# Patient Record
Sex: MALE | Race: WHITE | Employment: PART TIME | URBAN - METROPOLITAN AREA
[De-identification: names, ages, dates, MRNs, and addresses within clinical notes are randomized per-mention and may not be internally consistent; named-entity substitution may affect disease eponyms.]

---

## 2018-07-12 ENCOUNTER — HOSPITAL ENCOUNTER (EMERGENCY)
Age: 28
Discharge: HOME | End: 2018-07-13
Attending: EMERGENCY MEDICINE
Payer: COMMERCIAL

## 2018-07-12 ENCOUNTER — APPOINTMENT (OUTPATIENT)
Dept: GENERAL RADIOLOGY | Age: 28
End: 2018-07-12
Payer: COMMERCIAL

## 2018-07-12 DIAGNOSIS — S62.314A CLOSED DISPLACED FRACTURE OF BASE OF FOURTH METACARPAL BONE OF RIGHT HAND, INITIAL ENCOUNTER: Primary | ICD-10-CM

## 2018-07-12 DIAGNOSIS — S63.266A CLOSED DISLOCATION OF FIFTH METACARPAL BONE OF RIGHT HAND: ICD-10-CM

## 2018-07-12 PROCEDURE — 99152 MOD SED SAME PHYS/QHP 5/>YRS: CPT

## 2018-07-12 PROCEDURE — 73130 X-RAY EXAM OF HAND: CPT

## 2018-07-12 PROCEDURE — 26700 TREAT KNUCKLE DISLOCATION: CPT

## 2018-07-12 PROCEDURE — 6360000002 HC RX W HCPCS: Performed by: EMERGENCY MEDICINE

## 2018-07-12 PROCEDURE — 2500000003 HC RX 250 WO HCPCS: Performed by: EMERGENCY MEDICINE

## 2018-07-12 PROCEDURE — 6370000000 HC RX 637 (ALT 250 FOR IP): Performed by: EMERGENCY MEDICINE

## 2018-07-12 PROCEDURE — 96374 THER/PROPH/DIAG INJ IV PUSH: CPT

## 2018-07-12 PROCEDURE — 99283 EMERGENCY DEPT VISIT LOW MDM: CPT

## 2018-07-12 PROCEDURE — 2580000003 HC RX 258: Performed by: EMERGENCY MEDICINE

## 2018-07-12 RX ORDER — ETOMIDATE 2 MG/ML
10 INJECTION INTRAVENOUS ONCE
Status: COMPLETED | OUTPATIENT
Start: 2018-07-12 | End: 2018-07-12

## 2018-07-12 RX ORDER — 0.9 % SODIUM CHLORIDE 0.9 %
500 INTRAVENOUS SOLUTION INTRAVENOUS ONCE
Status: COMPLETED | OUTPATIENT
Start: 2018-07-12 | End: 2018-07-12

## 2018-07-12 RX ORDER — HYDROCODONE BITARTRATE AND ACETAMINOPHEN 5; 325 MG/1; MG/1
2 TABLET ORAL ONCE
Status: COMPLETED | OUTPATIENT
Start: 2018-07-12 | End: 2018-07-12

## 2018-07-12 RX ORDER — IBUPROFEN 800 MG/1
800 TABLET ORAL ONCE
Status: COMPLETED | OUTPATIENT
Start: 2018-07-12 | End: 2018-07-12

## 2018-07-12 RX ORDER — MORPHINE SULFATE 4 MG/ML
4 INJECTION, SOLUTION INTRAMUSCULAR; INTRAVENOUS ONCE
Status: COMPLETED | OUTPATIENT
Start: 2018-07-12 | End: 2018-07-12

## 2018-07-12 RX ADMIN — SODIUM CHLORIDE 500 ML: 9 INJECTION, SOLUTION INTRAVENOUS at 22:19

## 2018-07-12 RX ADMIN — HYDROCODONE BITARTRATE AND ACETAMINOPHEN 2 TABLET: 5; 325 TABLET ORAL at 21:17

## 2018-07-12 RX ADMIN — IBUPROFEN 800 MG: 800 TABLET ORAL at 21:17

## 2018-07-12 RX ADMIN — ETOMIDATE 10 MG: 2 INJECTION INTRAVENOUS at 23:08

## 2018-07-12 RX ADMIN — ETOMIDATE 10 MG: 2 INJECTION INTRAVENOUS at 22:23

## 2018-07-12 RX ADMIN — MORPHINE SULFATE 4 MG: 4 INJECTION INTRAVENOUS at 23:25

## 2018-07-12 ASSESSMENT — PAIN SCALES - GENERAL
PAINLEVEL_OUTOF10: 10
PAINLEVEL_OUTOF10: 6
PAINLEVEL_OUTOF10: 10

## 2018-07-12 ASSESSMENT — PAIN DESCRIPTION - ORIENTATION: ORIENTATION: RIGHT

## 2018-07-12 ASSESSMENT — PAIN DESCRIPTION - PAIN TYPE: TYPE: ACUTE PAIN

## 2018-07-12 ASSESSMENT — PAIN DESCRIPTION - PROGRESSION: CLINICAL_PROGRESSION: GRADUALLY WORSENING

## 2018-07-12 ASSESSMENT — PAIN DESCRIPTION - ONSET: ONSET: SUDDEN

## 2018-07-12 ASSESSMENT — PAIN DESCRIPTION - LOCATION: LOCATION: HAND

## 2018-07-13 VITALS
SYSTOLIC BLOOD PRESSURE: 145 MMHG | BODY MASS INDEX: 31.44 KG/M2 | RESPIRATION RATE: 5 BRPM | HEART RATE: 68 BPM | HEIGHT: 74 IN | TEMPERATURE: 98.4 F | DIASTOLIC BLOOD PRESSURE: 58 MMHG | OXYGEN SATURATION: 96 % | WEIGHT: 245 LBS

## 2018-07-13 PROCEDURE — 6370000000 HC RX 637 (ALT 250 FOR IP): Performed by: EMERGENCY MEDICINE

## 2018-07-13 RX ORDER — MORPHINE SULFATE 4 MG/ML
4 INJECTION, SOLUTION INTRAMUSCULAR; INTRAVENOUS ONCE
Status: DISCONTINUED | OUTPATIENT
Start: 2018-07-13 | End: 2018-07-13 | Stop reason: HOSPADM

## 2018-07-13 RX ORDER — HYDROCODONE BITARTRATE AND ACETAMINOPHEN 5; 325 MG/1; MG/1
1 TABLET ORAL EVERY 6 HOURS PRN
Qty: 10 TABLET | Refills: 0 | Status: SHIPPED | OUTPATIENT
Start: 2018-07-13 | End: 2018-07-16

## 2018-07-13 RX ORDER — HYDROCODONE BITARTRATE AND ACETAMINOPHEN 5; 325 MG/1; MG/1
2 TABLET ORAL ONCE
Status: COMPLETED | OUTPATIENT
Start: 2018-07-13 | End: 2018-07-13

## 2018-07-13 RX ADMIN — HYDROCODONE BITARTRATE AND ACETAMINOPHEN 2 TABLET: 5; 325 TABLET ORAL at 00:51

## 2018-07-13 ASSESSMENT — PAIN SCALES - GENERAL
PAINLEVEL_OUTOF10: 7
PAINLEVEL_OUTOF10: 4
PAINLEVEL_OUTOF10: 6

## 2018-07-13 NOTE — ED NOTES
07/13/2018 @ 1110 Pt called to advised he had tried to call Dr Marcus Grey office for f/u this am. Pt sts he was advised by his staff he is not in today and there is no one on call for him. Writer called [facility] and spoke with [facility]. She advised Dr Meredith Newton is off today and she would look into getting patient scheduled with another physician.     07/13/2018 @ [phone] Pat returned call from Physician miriam. She advised she spoke with Dr hCevy Ibrahim office and they would see if there is ability to \"squeeze\" the patient in today. Pat advised Dr Chevy Ibrahim office would call back. 07/13/2018 @ 1201 Received call from Dr Chevy Ibrahim office, spoke with Barry Turcios. She advised she spoke with Dr Naomie Swan and he felt this patient should be seen by Hand specialist because he would probably need to go to the OR.     07/13/2018 @ 1 Dr Nelia Verdugo currently on call for Jose Whyte for hands. Page put out for him through his answering service. 07/13/2018 @ 1223 Dr Nelai Verdugo returned call. Explained situation to MD and he advised pt is ok to follow up on Monday. Advised should keep splint in place and Dr Alejandra Philip was on call when he was here last pm so he should schedule f/u with him on Monday. 07/13/2018 @ 1230 Pt contacted advised he is ok to f/u with MD on Monday. Pt given contact info [phone] for Dr. Alejandra Philip and advised to call his office on Monday to schedule a follow up with him per Dr. Nelia Verdugo request. Pt advised to keep splint in place and f/u with hand surgeon, if increased pain or problems to f/u in ED.       Daniella Allen RN  07/13/18 [id number]

## 2018-07-13 NOTE — ED PROVIDER NOTES
mis-transcribed.)    Bessy Roberts,, DO  Attending Emergency Physician       Bessy Roberts, DO  07/13/18 [address] [facility],   07/13/18 1431

## 2018-07-13 NOTE — ED NOTES
Patient informed by Dr. Roberts that procedure was not successful and will need to be repeated. Patient agreeable.      Jr Funk, DONALD  07/13/18 [id number]

## 2018-07-13 NOTE — ED NOTES
Splint to right hand post reduction per Dr. Roberts, secured with ace wraps.      Hola Arias RN  07/12/18 [id number]

## 2018-07-23 PROBLEM — S62.314A CLOSED DISPLACED FRACTURE OF BASE OF FOURTH METACARPAL BONE OF RIGHT HAND: Status: ACTIVE | Noted: 2018-07-23

## 2018-07-24 ENCOUNTER — ANESTHESIA EVENT (OUTPATIENT)
Dept: OPERATING ROOM | Age: 28
End: 2018-07-24
Payer: COMMERCIAL

## 2018-07-25 ENCOUNTER — APPOINTMENT (OUTPATIENT)
Dept: GENERAL RADIOLOGY | Age: 28
End: 2018-07-25
Attending: PLASTIC SURGERY
Payer: COMMERCIAL

## 2018-07-25 ENCOUNTER — HOSPITAL ENCOUNTER (OUTPATIENT)
Age: 28
Setting detail: OUTPATIENT SURGERY
Discharge: HOME | End: 2018-07-25
Attending: PLASTIC SURGERY | Admitting: PLASTIC SURGERY
Payer: COMMERCIAL

## 2018-07-25 ENCOUNTER — ANESTHESIA (OUTPATIENT)
Dept: OPERATING ROOM | Age: 28
End: 2018-07-25
Payer: COMMERCIAL

## 2018-07-25 VITALS
OXYGEN SATURATION: 100 % | SYSTOLIC BLOOD PRESSURE: 144 MMHG | DIASTOLIC BLOOD PRESSURE: 74 MMHG | HEART RATE: 70 BPM | RESPIRATION RATE: 16 BRPM | BODY MASS INDEX: 32.08 KG/M2 | WEIGHT: 250 LBS | TEMPERATURE: 97.7 F | HEIGHT: 74 IN

## 2018-07-25 VITALS — OXYGEN SATURATION: 100 % | SYSTOLIC BLOOD PRESSURE: 126 MMHG | DIASTOLIC BLOOD PRESSURE: 57 MMHG | TEMPERATURE: 95.4 F

## 2018-07-25 DIAGNOSIS — G89.18 POST-OP PAIN: Primary | ICD-10-CM

## 2018-07-25 PROCEDURE — 2709999900 HC NON-CHARGEABLE SUPPLY: Performed by: PLASTIC SURGERY

## 2018-07-25 PROCEDURE — 6370000000 HC RX 637 (ALT 250 FOR IP): Performed by: ANESTHESIOLOGY

## 2018-07-25 PROCEDURE — 7100000000 HC PACU RECOVERY - FIRST 15 MIN: Performed by: PLASTIC SURGERY

## 2018-07-25 PROCEDURE — 6360000002 HC RX W HCPCS: Performed by: ANESTHESIOLOGY

## 2018-07-25 PROCEDURE — 7100000041 HC SPAR PHASE II RECOVERY - ADDTL 15 MIN: Performed by: PLASTIC SURGERY

## 2018-07-25 PROCEDURE — 2500000003 HC RX 250 WO HCPCS: Performed by: SPECIALIST

## 2018-07-25 PROCEDURE — C1713 ANCHOR/SCREW BN/BN,TIS/BN: HCPCS | Performed by: PLASTIC SURGERY

## 2018-07-25 PROCEDURE — 3700000000 HC ANESTHESIA ATTENDED CARE: Performed by: PLASTIC SURGERY

## 2018-07-25 PROCEDURE — 6360000002 HC RX W HCPCS: Performed by: SPECIALIST

## 2018-07-25 PROCEDURE — 73130 X-RAY EXAM OF HAND: CPT

## 2018-07-25 PROCEDURE — 2580000003 HC RX 258: Performed by: SPECIALIST

## 2018-07-25 PROCEDURE — 3600000014 HC SURGERY LEVEL 4 ADDTL 15MIN: Performed by: PLASTIC SURGERY

## 2018-07-25 PROCEDURE — 3700000001 HC ADD 15 MINUTES (ANESTHESIA): Performed by: PLASTIC SURGERY

## 2018-07-25 PROCEDURE — 3600000004 HC SURGERY LEVEL 4 BASE: Performed by: PLASTIC SURGERY

## 2018-07-25 PROCEDURE — 7100000040 HC SPAR PHASE II RECOVERY - FIRST 15 MIN: Performed by: PLASTIC SURGERY

## 2018-07-25 PROCEDURE — 7100000001 HC PACU RECOVERY - ADDTL 15 MIN: Performed by: PLASTIC SURGERY

## 2018-07-25 PROCEDURE — 2500000003 HC RX 250 WO HCPCS: Performed by: PLASTIC SURGERY

## 2018-07-25 PROCEDURE — 2580000003 HC RX 258: Performed by: PLASTIC SURGERY

## 2018-07-25 DEVICE — K WIRE FIX L6IN DIA1.1MM ST S STL 3 SIDE DBL TRCR BOTH END: Type: IMPLANTABLE DEVICE | Site: FINGERS | Status: FUNCTIONAL

## 2018-07-25 RX ORDER — LIDOCAINE HYDROCHLORIDE 10 MG/ML
INJECTION, SOLUTION EPIDURAL; INFILTRATION; INTRACAUDAL; PERINEURAL PRN
Status: DISCONTINUED | OUTPATIENT
Start: 2018-07-25 | End: 2018-07-25 | Stop reason: SDUPTHER

## 2018-07-25 RX ORDER — PROPOFOL 10 MG/ML
INJECTION, EMULSION INTRAVENOUS PRN
Status: DISCONTINUED | OUTPATIENT
Start: 2018-07-25 | End: 2018-07-25 | Stop reason: SDUPTHER

## 2018-07-25 RX ORDER — CEPHALEXIN 500 MG/1
500 CAPSULE ORAL 3 TIMES DAILY
Qty: 21 CAPSULE | Refills: 0 | Status: SHIPPED | OUTPATIENT
Start: 2018-07-25 | End: 2018-08-10

## 2018-07-25 RX ORDER — MAGNESIUM HYDROXIDE 1200 MG/15ML
LIQUID ORAL CONTINUOUS PRN
Status: DISCONTINUED | OUTPATIENT
Start: 2018-07-25 | End: 2018-07-25 | Stop reason: HOSPADM

## 2018-07-25 RX ORDER — OXYCODONE HYDROCHLORIDE AND ACETAMINOPHEN 5; 325 MG/1; MG/1
1 TABLET ORAL
Status: COMPLETED | OUTPATIENT
Start: 2018-07-25 | End: 2018-07-25

## 2018-07-25 RX ORDER — SODIUM CHLORIDE, SODIUM LACTATE, POTASSIUM CHLORIDE, CALCIUM CHLORIDE 600; 310; 30; 20 MG/100ML; MG/100ML; MG/100ML; MG/100ML
INJECTION, SOLUTION INTRAVENOUS CONTINUOUS PRN
Status: DISCONTINUED | OUTPATIENT
Start: 2018-07-25 | End: 2018-07-25 | Stop reason: SDUPTHER

## 2018-07-25 RX ORDER — ONDANSETRON 2 MG/ML
INJECTION INTRAMUSCULAR; INTRAVENOUS PRN
Status: DISCONTINUED | OUTPATIENT
Start: 2018-07-25 | End: 2018-07-25 | Stop reason: SDUPTHER

## 2018-07-25 RX ORDER — SODIUM CHLORIDE, SODIUM LACTATE, POTASSIUM CHLORIDE, CALCIUM CHLORIDE 600; 310; 30; 20 MG/100ML; MG/100ML; MG/100ML; MG/100ML
INJECTION, SOLUTION INTRAVENOUS CONTINUOUS
Status: DISCONTINUED | OUTPATIENT
Start: 2018-07-25 | End: 2018-07-25 | Stop reason: HOSPADM

## 2018-07-25 RX ORDER — BUPIVACAINE HYDROCHLORIDE AND EPINEPHRINE 5; 5 MG/ML; UG/ML
INJECTION, SOLUTION EPIDURAL; INTRACAUDAL; PERINEURAL PRN
Status: DISCONTINUED | OUTPATIENT
Start: 2018-07-25 | End: 2018-07-25 | Stop reason: HOSPADM

## 2018-07-25 RX ORDER — OXYCODONE HYDROCHLORIDE AND ACETAMINOPHEN 5; 325 MG/1; MG/1
1 TABLET ORAL EVERY 8 HOURS PRN
Qty: 20 TABLET | Refills: 0 | Status: SHIPPED | OUTPATIENT
Start: 2018-07-25 | End: 2018-08-01

## 2018-07-25 RX ORDER — FENTANYL CITRATE 50 UG/ML
INJECTION, SOLUTION INTRAMUSCULAR; INTRAVENOUS PRN
Status: DISCONTINUED | OUTPATIENT
Start: 2018-07-25 | End: 2018-07-25 | Stop reason: SDUPTHER

## 2018-07-25 RX ORDER — MIDAZOLAM HYDROCHLORIDE 1 MG/ML
INJECTION INTRAMUSCULAR; INTRAVENOUS PRN
Status: DISCONTINUED | OUTPATIENT
Start: 2018-07-25 | End: 2018-07-25 | Stop reason: SDUPTHER

## 2018-07-25 RX ADMIN — LIDOCAINE HYDROCHLORIDE 50 MG: 10 INJECTION, SOLUTION EPIDURAL; INFILTRATION; INTRACAUDAL at 11:09

## 2018-07-25 RX ADMIN — OXYCODONE HYDROCHLORIDE AND ACETAMINOPHEN 1 TABLET: 5; 325 TABLET ORAL at 12:52

## 2018-07-25 RX ADMIN — HYDROMORPHONE HYDROCHLORIDE 0.5 MG: 1 INJECTION, SOLUTION INTRAMUSCULAR; INTRAVENOUS; SUBCUTANEOUS at 12:54

## 2018-07-25 RX ADMIN — PROPOFOL 50 MG: 10 INJECTION, EMULSION INTRAVENOUS at 11:15

## 2018-07-25 RX ADMIN — HYDROMORPHONE HYDROCHLORIDE 0.5 MG: 1 INJECTION, SOLUTION INTRAMUSCULAR; INTRAVENOUS; SUBCUTANEOUS at 12:10

## 2018-07-25 RX ADMIN — HYDROMORPHONE HYDROCHLORIDE 0.25 MG: 1 INJECTION, SOLUTION INTRAMUSCULAR; INTRAVENOUS; SUBCUTANEOUS at 12:22

## 2018-07-25 RX ADMIN — SODIUM CHLORIDE, POTASSIUM CHLORIDE, SODIUM LACTATE AND CALCIUM CHLORIDE: 600; 310; 30; 20 INJECTION, SOLUTION INTRAVENOUS at 10:53

## 2018-07-25 RX ADMIN — Medication 2 G: at 11:10

## 2018-07-25 RX ADMIN — MIDAZOLAM HYDROCHLORIDE 2 MG: 1 INJECTION, SOLUTION INTRAMUSCULAR; INTRAVENOUS at 11:06

## 2018-07-25 RX ADMIN — PROPOFOL 200 MG: 10 INJECTION, EMULSION INTRAVENOUS at 11:09

## 2018-07-25 RX ADMIN — PROPOFOL 50 MG: 10 INJECTION, EMULSION INTRAVENOUS at 11:12

## 2018-07-25 RX ADMIN — HYDROMORPHONE HYDROCHLORIDE 0.25 MG: 1 INJECTION, SOLUTION INTRAMUSCULAR; INTRAVENOUS; SUBCUTANEOUS at 12:12

## 2018-07-25 RX ADMIN — HYDROMORPHONE HYDROCHLORIDE 0.25 MG: 1 INJECTION, SOLUTION INTRAMUSCULAR; INTRAVENOUS; SUBCUTANEOUS at 12:27

## 2018-07-25 RX ADMIN — PROPOFOL 50 MG: 10 INJECTION, EMULSION INTRAVENOUS at 11:11

## 2018-07-25 RX ADMIN — SODIUM CHLORIDE, POTASSIUM CHLORIDE, SODIUM LACTATE AND CALCIUM CHLORIDE: 600; 310; 30; 20 INJECTION, SOLUTION INTRAVENOUS at 11:06

## 2018-07-25 RX ADMIN — FENTANYL CITRATE 50 MCG: 50 INJECTION INTRAMUSCULAR; INTRAVENOUS at 11:12

## 2018-07-25 RX ADMIN — FENTANYL CITRATE 50 MCG: 50 INJECTION INTRAMUSCULAR; INTRAVENOUS at 11:09

## 2018-07-25 RX ADMIN — ONDANSETRON 4 MG: 2 INJECTION, SOLUTION INTRAMUSCULAR; INTRAVENOUS at 11:30

## 2018-07-25 RX ADMIN — HYDROMORPHONE HYDROCHLORIDE 0.25 MG: 1 INJECTION, SOLUTION INTRAMUSCULAR; INTRAVENOUS; SUBCUTANEOUS at 12:17

## 2018-07-25 RX ADMIN — OXYCODONE HYDROCHLORIDE AND ACETAMINOPHEN 1 TABLET: 5; 325 TABLET ORAL at 13:38

## 2018-07-25 ASSESSMENT — PULMONARY FUNCTION TESTS
PIF_VALUE: 2
PIF_VALUE: 3
PIF_VALUE: 2
PIF_VALUE: 4
PIF_VALUE: 3
PIF_VALUE: 15
PIF_VALUE: 3
PIF_VALUE: 2
PIF_VALUE: 0
PIF_VALUE: 3
PIF_VALUE: 3
PIF_VALUE: 6
PIF_VALUE: 1
PIF_VALUE: 2
PIF_VALUE: 6
PIF_VALUE: 3
PIF_VALUE: 2
PIF_VALUE: 3
PIF_VALUE: 7
PIF_VALUE: 3
PIF_VALUE: 3
PIF_VALUE: 1
PIF_VALUE: 7
PIF_VALUE: 2
PIF_VALUE: 9
PIF_VALUE: 3
PIF_VALUE: 6
PIF_VALUE: 2
PIF_VALUE: 2
PIF_VALUE: 3
PIF_VALUE: 13
PIF_VALUE: 3
PIF_VALUE: 1
PIF_VALUE: 2
PIF_VALUE: 2
PIF_VALUE: 3
PIF_VALUE: 3
PIF_VALUE: 10
PIF_VALUE: 0
PIF_VALUE: 3
PIF_VALUE: 12
PIF_VALUE: 6

## 2018-07-25 ASSESSMENT — PAIN SCALES - GENERAL
PAINLEVEL_OUTOF10: 8
PAINLEVEL_OUTOF10: 6
PAINLEVEL_OUTOF10: 8
PAINLEVEL_OUTOF10: 8
PAINLEVEL_OUTOF10: 6
PAINLEVEL_OUTOF10: 10
PAINLEVEL_OUTOF10: 10
PAINLEVEL_OUTOF10: 6
PAINLEVEL_OUTOF10: 10
PAINLEVEL_OUTOF10: 10
PAINLEVEL_OUTOF10: 8

## 2018-07-25 ASSESSMENT — PAIN DESCRIPTION - PAIN TYPE: TYPE: SURGICAL PAIN

## 2018-07-25 ASSESSMENT — PAIN - FUNCTIONAL ASSESSMENT: PAIN_FUNCTIONAL_ASSESSMENT: 0-10

## 2018-07-25 ASSESSMENT — PAIN DESCRIPTION - LOCATION: LOCATION: HAND

## 2018-07-25 ASSESSMENT — PAIN DESCRIPTION - ORIENTATION: ORIENTATION: RIGHT

## 2018-07-25 NOTE — BRIEF OP NOTE
Brief Postoperative Note  ______________________________________________________________    Patient: Patrick Bush  YOB: 1990  MRN: [id number]  Date of Procedure: 7/25/2018    Pre-Op Diagnosis: FRACTURES RIGHT SMALL AND RING FINGER METACARPAL BASE    Post-Op Diagnosis: Same       Procedure(s):  CLOSED REDUCTION PERC PINNING Right SMALL AND FINGER FINGER METACARPAL BASE - C ARM, SYNTHES    Anesthesia: General    Surgeon(s):  MD Soren Tierney, DO    Staff:  Scrub Person First: Eliezer Lazo     Estimated Blood Loss: <8FK    Complications: None      Implants:    Implant Name Type Inv.  Item Serial No.  Lot No. LRB No. Used   IMPL KWIRE 0.45 X 6IN ST Screw/Plate/Nail/[place] IMPL KWIRE 0.45 X [address]   Right 2           [facility] [place]  Date: 7/25/2018  Time: 11:49 AM

## 2018-07-25 NOTE — H&P
Pt Name: Cary Spencer  MRN: [id number]  YOB: 1990  Date of evaluation: 7/25/2018    I have reviewed the patient's history and physical examination completed on 7/23/18 by Dr. Alejandra hPilip.     Changes to history or on examination, if any, are as follows:  none    GABBY NOGUEIRA PA-C  7/25/18  10:51 AM

## 2018-07-25 NOTE — OP NOTE
Patient: Tona Farr  YOB: 1990  MRN: [id number]  Date of Procedure: 7/25/2018     Pre-Op Diagnosis: FRACTURES RIGHT SMALL AND RING FINGER METACARPAL BASE     Post-Op Diagnosis: Same       Procedure(s):  CLOSED REDUCTION PERCUTANEOUS PINNING RIGHT SMALL AND RING FINGER METACARPAL BASE - C ARM, SYNTHES     Anesthesia: General     Surgeon(s):  MD Virgie Livingston DO     Staff:  Scrub Person First: Felicita Serraumbly      Estimated Blood Loss: <2MJ     Complications: None        Implants:     Implant Name Type Inv. Item Serial No.  Lot No. LRB No. Used   IMPL KWIRE 0.45 X 6IN ST Screw/Plate/Nail/[employer] IMPL KWIRE 0.45 X [address]   Right 2             Jamee Rajeev, DO  Date: 7/25/2018  Time: 11:49 AM      INDICATIONS:  Pt is a 34 y.o. male who presents with a displaced fracture of the right small and ring M/C. The patient now presents for CRPP vs ORIF. The proposed procedure was discussed with the patient at great length. The risks of the procedure were also discussed, including but not limited to the risk of infection, bleeding, scar formation, keloid formation, malunion, nonunion, stiffness, decreased range of motion, neurovascular injury, failure to resolve symptoms, metacarpal shortening, the need for therapy and need for reoperation. The patient understands all the above and wishes to proceed. PROCEDURE:  The patient was brought into the operating room, laid in the supine position, and placed under general anesthesia. The right hand was prepped and draped in a sterile fashion. Flouroscopy confirmed right ring finger oblique metacarpal base fracture as well as right small finger non-displaced metacarpal base fracture. A closed reduction was then performed and anatomic alignment of length, rotation, angulation was confirmed with fluoroscopy in multiple planes.  At this time, the right ring finger was flexed to 90 degrees at the MCP joint and one 0.45 k-wire was placed in a retrograde, percutaneous fashion starting from the metacarpal head straight down the shaft of the metacarpal. The pin was continued through the reduced oblique base fracture and into the carpal bones. Reduction of the fracture and stability under stress was confirmed on fluoroscopy in multiple planes. Next, the right small finger was flexed to 90 degrees at the MCP joint and one 0.45 k-wire was placed in a retrograde, percutaneous fashion starting from the metacarpal head straight down the shaft of the metacarpal. The pin was continued through the non-displaced base fracture and into the carpal bones. Reduction of the fracture and stability under stress was confirmed on fluroscopy in multiple planes. The two pins were bent and cut distally and one jurgan ball placed on each pin. Local anesthetic using 0.5% bupivacaine with epinephrine was injected into the the tissue surrounding the pin sites. A bulky dressing with adaptic and gauze cling was applied followed by a ulnar gutter splint. The procedure was well tolerated without complications. Pt was extubated and transferred to PACU without difficulty. Pt will call with any questions or problems prior to follow up.

## 2018-07-25 NOTE — ANESTHESIA PRE PROCEDURE
Department of Anesthesiology  Preprocedure Note       Name:  Griselda Mages   Age:  34 y.o.  :  1990                                          MRN:  [id number]         Date:  2018      Surgeon: Berenice Sheets):  Mayo Lesches, MD    Procedure: Procedure(s):  OPEN REDUCTION INTERNAL FIXATION SMALL AND FINGER FINGER METACARPAL BASE - C ARM, SYNTHES    Medications prior to admission:   Prior to Admission medications    Medication Sig Start Date End Date Taking? Authorizing Provider   HYDROcodone-acetaminophen (NORCO) 5-325 MG per tablet Take 1 tablet by mouth every 6 hours as needed for Pain. .   Yes Historical Provider, MD   ibuprofen (ADVIL;MOTRIN) 800 MG tablet Take 1 tablet by mouth every 8 hours as needed for Pain 16  Yes Sorin García DO       Current medications:    No current facility-administered medications for this encounter.         Allergies:  No Known Allergies    Problem List:    Patient Active Problem List   Diagnosis Code    Closed displaced fracture of base of fourth metacarpal bone of right hand S62.314A       Past Medical History:        Diagnosis Date    GERD (gastroesophageal reflux disease)        Past Surgical History:        Procedure Laterality Date    WISDOM TOOTH EXTRACTION         Social History:    Social History   Substance Use Topics    Smoking status: Former Smoker     Packs/day: 0.50     Types: Cigarettes     Quit date: 2018    Smokeless tobacco: Never Used    Alcohol use 0.0 oz/week      Comment: social                                Counseling given: Not Answered      Vital Signs (Current):   Vitals:    18 1032 18 1042   BP:  (!) 147/87   Pulse:  77   Resp:  16   Temp:  36.3 °C (97.4 °F)   TempSrc:  Temporal   SpO2:  97%   Weight: 250 lb (113.4 kg)    Height: 6' 2\" (1.88 m)                                               BP Readings from Last 3 Encounters:   18 (!) 147/87   18 101/66   18 (!) 145/58       NPO Status: Time of last liquid consumption: 2100                        Time of last solid consumption: 2100                        Date of last liquid consumption: 07/24/18                        Date of last solid food consumption: 07/24/18    BMI:   Wt Readings from Last 3 Encounters:   07/25/18 250 lb (113.4 kg)   07/18/18 245 lb (111.1 kg)   07/12/18 245 lb (111.1 kg)     Body mass index is 32.1 kg/m². CBC:   Lab Results   Component Value Date    WBC 14.7 04/09/2015    RBC 4.98 04/09/2015    HGB 14.8 04/09/2015    HCT 42.9 04/09/2015    MCV 86.2 04/09/2015    RDW 12.8 04/09/2015     04/09/2015       CMP:   Lab Results   Component Value Date     04/09/2015    K 3.4 04/09/2015     04/09/2015    CO2 24 04/09/2015    BUN 9 04/09/2015    CREATININE 0.93 04/09/2015    GFRAA >60 04/09/2015    LABGLOM >60 04/09/2015    GLUCOSE 98 04/09/2015    CALCIUM 9.9 04/09/2015       POC Tests: No results for input(s): POCGLU, POCNA, POCK, POCCL, POCBUN, POCHEMO, POCHCT in the last 72 hours. Coags: No results found for: PROTIME, INR, APTT    HCG (If Applicable): No results found for: PREGTESTUR, PREGSERUM, HCG, HCGQUANT     ABGs: No results found for: PHART, PO2ART, QUK4YYX, GAS7SIA, BEART, M7JJEWIQ     Type & Screen (If Applicable):  No results found for: LABABO, LABRH    Anesthesia Evaluation    Airway: Mallampati: II  TM distance: >3 FB   Neck ROM: full  Mouth opening: > = 3 FB Dental:          Pulmonary:Negative Pulmonary ROS and normal exam                               Cardiovascular:Negative CV ROS  Exercise tolerance: good (>4 METS),                     Neuro/Psych:   Negative Neuro/Psych ROS              GI/Hepatic/Renal: Neg GI/Hepatic/Renal ROS            Endo/Other: Negative Endo/Other ROS                    Abdominal:           Vascular: negative vascular ROS. Anesthesia Plan      general     ASA 2       Induction: intravenous.     MIPS: Postoperative opioids intended and Prophylactic antiemetics administered. Anesthetic plan and risks discussed with patient. Use of blood products discussed with patient whom. Plan discussed with CRNA and attending.                   ELIGIO Aggarwal - CRNA   7/25/2018

## 2018-08-10 ENCOUNTER — HOSPITAL ENCOUNTER (EMERGENCY)
Age: 28
Discharge: HOME | End: 2018-08-10
Attending: EMERGENCY MEDICINE
Payer: COMMERCIAL

## 2018-08-10 ENCOUNTER — APPOINTMENT (OUTPATIENT)
Dept: GENERAL RADIOLOGY | Age: 28
End: 2018-08-10
Payer: COMMERCIAL

## 2018-08-10 VITALS
DIASTOLIC BLOOD PRESSURE: 83 MMHG | RESPIRATION RATE: 18 BRPM | HEART RATE: 113 BPM | TEMPERATURE: 97.9 F | BODY MASS INDEX: 32.08 KG/M2 | HEIGHT: 74 IN | SYSTOLIC BLOOD PRESSURE: 137 MMHG | OXYGEN SATURATION: 96 % | WEIGHT: 250 LBS

## 2018-08-10 DIAGNOSIS — S93.401A SPRAIN OF RIGHT ANKLE, UNSPECIFIED LIGAMENT, INITIAL ENCOUNTER: ICD-10-CM

## 2018-08-10 DIAGNOSIS — S92.351A CLOSED DISPLACED FRACTURE OF FIFTH METATARSAL BONE OF RIGHT FOOT, INITIAL ENCOUNTER: Primary | ICD-10-CM

## 2018-08-10 PROCEDURE — 73610 X-RAY EXAM OF ANKLE: CPT

## 2018-08-10 PROCEDURE — 99283 EMERGENCY DEPT VISIT LOW MDM: CPT

## 2018-08-10 PROCEDURE — 73630 X-RAY EXAM OF FOOT: CPT

## 2018-08-10 PROCEDURE — 6370000000 HC RX 637 (ALT 250 FOR IP): Performed by: EMERGENCY MEDICINE

## 2018-08-10 RX ORDER — OXYCODONE HYDROCHLORIDE AND ACETAMINOPHEN 5; 325 MG/1; MG/1
1 TABLET ORAL EVERY 6 HOURS PRN
Qty: 12 TABLET | Refills: 0 | Status: SHIPPED | OUTPATIENT
Start: 2018-08-10 | End: 2018-08-13

## 2018-08-10 RX ORDER — IBUPROFEN 800 MG/1
800 TABLET ORAL EVERY 8 HOURS PRN
Qty: 30 TABLET | Refills: 0 | Status: SHIPPED | OUTPATIENT
Start: 2018-08-10 | End: 2018-08-28

## 2018-08-10 RX ORDER — OXYCODONE HYDROCHLORIDE AND ACETAMINOPHEN 5; 325 MG/1; MG/1
2 TABLET ORAL ONCE
Status: COMPLETED | OUTPATIENT
Start: 2018-08-10 | End: 2018-08-10

## 2018-08-10 RX ADMIN — OXYCODONE HYDROCHLORIDE AND ACETAMINOPHEN 2 TABLET: 5; 325 TABLET ORAL at 15:07

## 2018-08-10 ASSESSMENT — ENCOUNTER SYMPTOMS
DIARRHEA: 0
VOMITING: 0
NAUSEA: 0
COLOR CHANGE: 1
COUGH: 0
SHORTNESS OF BREATH: 0
SORE THROAT: 0

## 2018-08-10 ASSESSMENT — PAIN SCALES - GENERAL
PAINLEVEL_OUTOF10: 8
PAINLEVEL_OUTOF10: 8

## 2018-08-10 NOTE — ED NOTES
Mili asked to assist patient with a wheelchair. Mili called [facility] after hours, Mili asked to fax orders. Mili faxed to [phone]. Mili will call to confirm delivery.

## 2018-08-11 NOTE — ED PROVIDER NOTES
(ADVIL;MOTRIN) 800 MG tablet     Sig: Take 1 tablet by mouth every 8 hours as needed for Pain     Dispense:  30 tablet     Refill:  0    Misc. Devices (FREE SPIRIT KNEE/LEG WALKER) MISC     Sig: Please use walker to mobilize until cleared by podiatry to bear weight on foot. Dispense:  1 each     Refill:  0       DDX: Sprain, Pseudo-Hennessy fracture, Hennessy fracture, Pickard AC, Lisfranc, Maissonneuve, additional fractures    DIAGNOSTIC RESULTS / [address] [place] [address] / [place]     LABS:  No results found for this visit on 08/10/18. RADIOLOGY:  Xr Hand Right (min 3 Views)    Result Date: 8/14/2018  EXAMINATION: X-ray right hand TECHNIQUE: Three radiographic views of the right hand. COMPARISON: Intraoperative study 07/25/2018 HISTORY: Valma Cuba on right hand, status post previous surgery with K-wires. Pain. FINDINGS: K-wires are in place as seen on the intraoperative study through the 4th and 5th metacarpals and appear intact. Fracture at the base of the 4th metacarpal demonstrates some callus formation. No new fractures are seen. Alignment appears stable. Postoperative changes. No acute findings. Xr Hand Right (min 3 Views)    Result Date: 7/25/2018  EXAMINATION: SPOT FLUOROSCOPIC IMAGES 7/25/2018 11:43 am TECHNIQUE: Fluoroscopy was provided by the radiology department for procedure. Radiologist was not present during examination. FLUOROSCOPY DOSE AND TYPE OR TIME AND EXPOSURES: Cumulative dose: 1.0 mGy Fluoro time: 47.1 sec 3 images COMPARISON: None HISTORY: Comminuted fracture 4th metacarpal base. FINDINGS: Internal fixation of 4th metacarpal base fracture with percutaneous pinning with 2 K-wires. Intraprocedural fluoroscopic spot images as above. See separate procedure report for more information. Xr Ankle Right (min 3 Views)    Result Date: 8/10/2018  EXAMINATION: 3 XRAY VIEWS OF THE RIGHT ANKLE; 3 XRAY VIEWS OF THE RIGHT FOOT 8/10/2018 4:02 pm COMPARISON: None.  HISTORY: ORDERING SYSTEM seen.  Moderate soft tissue swelling over the forefoot is noted. Right ankle:  Oft tissue swelling. No acute osseous abnormality. Findings as above. Foot:  Comminuted base of 5th metatarsal bone, intra-articular, with 3 mm offset. Soft tissue swelling. No dislocation is noted. LisFranc relationship is preserved. EKG  None    All EKG's are interpreted by the Emergency Department Physician who either signs or Co-signs this chart in the absence of a cardiologist.    EMERGENCY DEPARTMENT COURSE:  Pt seen and evaluated. He is sitting in bed in some discomfort secondary to the ankle/foot pain he is having. Examination findings of significant swelling, ttp of the right ankle and foot. Unable to range ankle or foot very much secondary to the pain. Neurovascularly intact. XRs of foot and ankle ordered. Pt given percocet for pain control. Image findings of an acute comminuted fracture of the 5th metatarsal, intra-articular with 3.8 mm offset. Updated pt on the imaging results and paged podiatry for consultation and management of the injury. Podiatry came and splinted the foot. Pt is to follow-up with them in 1 week. Pt unable to use crutches to help mobilize and keep weight off the foot because he is recently post-op from a 4th metacarpal repair of the right hand. He has a splint in place on the hand. Scripts given for wheelchair and rolling knee walker. No DME stores open at this [place], social work consulted and able to get a wheelchair delivered for the pt to take tonight. All questions answered. Pt stable and ready for discharge home. Awaiting delivery of the wheelchair, the pt was signed out to the incoming resident. PROCEDURES:  None    CONSULTS:  IP CONSULT TO PODIATRY    CRITICAL CARE:  None    FINAL IMPRESSION      1. Closed displaced fracture of fifth metatarsal bone of right foot, initial encounter    2.  Sprain of right ankle, unspecified ligament, initial encounter          Aneesh Glasgow / Dora Fields

## 2018-08-12 ENCOUNTER — APPOINTMENT (OUTPATIENT)
Dept: GENERAL RADIOLOGY | Age: 28
End: 2018-08-12
Payer: COMMERCIAL

## 2018-08-12 ENCOUNTER — HOSPITAL ENCOUNTER (EMERGENCY)
Age: 28
Discharge: HOME | End: 2018-08-12
Attending: EMERGENCY MEDICINE
Payer: COMMERCIAL

## 2018-08-12 DIAGNOSIS — M79.641 PAIN OF RIGHT HAND: Primary | ICD-10-CM

## 2018-08-12 PROCEDURE — 73130 X-RAY EXAM OF HAND: CPT

## 2018-08-12 PROCEDURE — 99283 EMERGENCY DEPT VISIT LOW MDM: CPT

## 2018-08-12 PROCEDURE — 6370000000 HC RX 637 (ALT 250 FOR IP): Performed by: EMERGENCY MEDICINE

## 2018-08-12 RX ORDER — OXYCODONE HYDROCHLORIDE AND ACETAMINOPHEN 5; 325 MG/1; MG/1
1 TABLET ORAL ONCE
Status: COMPLETED | OUTPATIENT
Start: 2018-08-12 | End: 2018-08-12

## 2018-08-12 RX ADMIN — OXYCODONE HYDROCHLORIDE AND ACETAMINOPHEN 1 TABLET: 5; 325 TABLET ORAL at 04:15

## 2018-08-12 ASSESSMENT — PAIN SCALES - GENERAL: PAINLEVEL_OUTOF10: 9

## 2018-08-13 ASSESSMENT — ENCOUNTER SYMPTOMS
COUGH: 0
BACK PAIN: 0
SHORTNESS OF BREATH: 0
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
## 2018-08-13 NOTE — ED PROVIDER NOTES
[facility] ED  Emergency Department Encounter  Emergency Medicine Resident Note     Pt Name: Cary Spencer  MRN: [id number]  [place] 1990  Date of evaluation: 8/12/2018  PCP:  Isaias Calles MD    CHIEF COMPLAINT       Hand pain    HISTORY OF PRESENT ILLNESS  (Location/Symptom, Timing/Onset, Context/Setting, Quality, Duration, Modifying Factors, Severity.)      Cary Spencer is a 34 y.o. male who presents with Right hand pain. Patient had reported right hand fracture and had pins placed in this in the end of July. Several days ago he fell over a curb and broke his right ankle. He states that he did his hand when he fell, and is concerned that he may have loosened the hardware. He has had some numbness and tingling to the tips of his fourth and fifth digit. However fingers is sober and warm to the touch. Patient's surgery was done by Dr. Bethel Smallwood. This note is a late chart entry due to [facility] downtime. Please see downtime charting for details of vitals, medications, nursing information, and other documentation. PAST MEDICAL / SURGICAL / SOCIAL / FAMILY HISTORY     Past Medical History:  has a past medical history of GERD (gastroesophageal reflux disease). Past Surgical History:  has a past surgical history that includes [place] tooth extraction; Finger Closed Reduction (07/25/2018); and pr office/outpt visit,procedure only (Right, 7/25/2018). Allergies:  Patient has no known allergies. Home Meds:   Prior to Visit Medications    Medication Sig Taking? Authorizing Provider   oxyCODONE-acetaminophen (PERCOCET) 5-325 MG per tablet Take 1 tablet by mouth every 6 hours as needed for Pain for up to 3 days. Intended supply: 3 days. Take lowest dose possible to manage pain. Moises Pereyra MD   ibuprofen (ADVIL;MOTRIN) 800 MG tablet Take 1 tablet by mouth every 8 hours as needed for Pain  Moises Pereyra MD   Misc.  Devices (FREE SPIRIT KNEE/LEG WALKER) MISC Please use walker to mobilize until cleared by podiatry to bear weight on foot. King Key MD     Please note that medications prescribed at discharge will auto-populate into this medication list when note is refreshed. Please look at prescription date and prescriber to clarify. Family History: reviewed with patient and none reported. Social History: He reports that he quit smoking about 6 months ago. His smoking use included Cigarettes. He smoked 0.50 packs per day. He has never used smokeless tobacco. He reports that he drinks alcohol. He reports that he does not use drugs. He reports that he currently engages in sexual activity and has had female partners. REVIEW OF SYSTEMS    (2-9 systems for level 4, 10 or more for level 5)      Review of Systems   Constitutional: Negative for chills and fever. Respiratory: Negative for cough and shortness of breath. Cardiovascular: Negative for chest pain and palpitations. Musculoskeletal: Positive for arthralgias and myalgias. Negative for back pain, gait problem, neck pain and neck stiffness. Skin: Negative for pallor, rash and wound. Neurological: Positive for numbness. Negative for weakness, light-headedness and headaches. PHYSICAL EXAM   (up to 7 for level 4, 8 or more for level 5)      Initial Vitals   ED Triage Vitals   BP Temp Temp src Pulse Resp SpO2 Height Weight   -- -- -- -- -- -- -- --       Physical Exam   Constitutional: He is oriented to person, place, and time. He appears well-developed and well-nourished. No distress. HENT:   Head: Normocephalic and atraumatic. Cardiovascular: Normal rate, regular rhythm and intact distal pulses. Exam reveals no gallop and no friction rub. No murmur heard. Normal capillary refill   Pulmonary/Chest: Effort normal and breath sounds normal. No respiratory distress. He has no wheezes. He has no rales. Musculoskeletal:   Right hand in ulnar gutter splint. No edema of the digits.   Splint on right foot. Neurological: He is alert and oriented to person, place, and time. Normal sensation to the hand   Skin: He is not diaphoretic. Nursing note and vitals reviewed. DIFFERENTIAL DIAGNOSIS/IMPRESSION     DDX: Displaced hardware. Pain of the hand, contusion    Impression: 34 y.o. male who presents with concerned that his right hand hardware appears place because he fell and broke his foot 3 days ago and struck his head when he fell. He had surgery in July. Patient does have some numbness, but this is been going on since his surgery. He is neurovascularly intact with no signs of decreased perfusion. We'll get x-ray to make sure hardware is in place. DIAGNOSTIC RESULTS     EKG: All EKG's are interpreted by the Emergency Department Physician who either signs or Co-signs this chart in the absence of a cardiologist.    Not clinically indicated at this time. LABS: Labs were reviewed by me and abnormal results are displayed above     Labs Reviewed - No data to display    RADIOLOGY: All plain film, CT, MRI, and formal ultrasound images (except ED bedside ultrasound) are read by the radiologist, see reports below, unless otherwise noted in ED Course, MDM or here. Xr Hand Right (min 3 Views)    Result Date: 8/12/2018  EXAMINATION: Three views of the right hand COMPARISON: Intraoperative study 07/25/2018 HISTORY: Brooke Curl on right hand, status post previous surgery with K-wires. Pain. FINDINGS: K-wires are in place as seen on the intraoperative study through the 4th and 5th metacarpals and appear intact. Fracture at the base of the 4th metacarpal demonstrates some callus formation. No new fractures are seen. Alignment appears stable. Postoperative changes. No acute findings. BEDSIDE ULTRASOUND:  Not clinically indicated at this time.       ED COURSE      ED Medication Orders     Start Ordered     Status Ordering Provider    08/12/18 0415 08/12/18 0406  oxyCODONE-acetaminophen

## 2018-08-15 ENCOUNTER — OFFICE VISIT (OUTPATIENT)
Dept: PODIATRY | Age: 28
End: 2018-08-15
Payer: COMMERCIAL

## 2018-08-15 VITALS
HEIGHT: 74 IN | HEART RATE: 88 BPM | BODY MASS INDEX: 32.08 KG/M2 | WEIGHT: 250 LBS | DIASTOLIC BLOOD PRESSURE: 87 MMHG | SYSTOLIC BLOOD PRESSURE: 136 MMHG

## 2018-08-15 DIAGNOSIS — S82.51XA CLOSED AVULSION FRACTURE OF MEDIAL MALLEOLUS OF RIGHT TIBIA, INITIAL ENCOUNTER: ICD-10-CM

## 2018-08-15 DIAGNOSIS — S92.351A DISPLACED FRACTURE OF FIFTH METATARSAL BONE, RIGHT FOOT, INITIAL ENCOUNTER FOR CLOSED FRACTURE: Primary | ICD-10-CM

## 2018-08-15 PROCEDURE — 99203 OFFICE O/P NEW LOW 30 MIN: CPT | Performed by: STUDENT IN AN ORGANIZED HEALTH CARE EDUCATION/TRAINING PROGRAM

## 2018-08-15 PROCEDURE — 1036F TOBACCO NON-USER: CPT | Performed by: STUDENT IN AN ORGANIZED HEALTH CARE EDUCATION/TRAINING PROGRAM

## 2018-08-15 PROCEDURE — 29405 APPL SHORT LEG CAST: CPT | Performed by: STUDENT IN AN ORGANIZED HEALTH CARE EDUCATION/TRAINING PROGRAM

## 2018-08-15 PROCEDURE — G8427 DOCREV CUR MEDS BY ELIG CLIN: HCPCS | Performed by: STUDENT IN AN ORGANIZED HEALTH CARE EDUCATION/TRAINING PROGRAM

## 2018-08-15 PROCEDURE — G8417 CALC BMI ABV UP PARAM F/U: HCPCS | Performed by: STUDENT IN AN ORGANIZED HEALTH CARE EDUCATION/TRAINING PROGRAM

## 2018-08-15 RX ORDER — HYDROCODONE BITARTRATE AND ACETAMINOPHEN 5; 325 MG/1; MG/1
1 TABLET ORAL EVERY 6 HOURS PRN
Qty: 20 TABLET | Refills: 0 | Status: SHIPPED | OUTPATIENT
Start: 2018-08-15 | End: 2018-08-20

## 2018-08-15 ASSESSMENT — ENCOUNTER SYMPTOMS
SORE THROAT: 0
SHORTNESS OF BREATH: 0
ABDOMINAL PAIN: 0
NAUSEA: 0
EYE PAIN: 0
COUGH: 0
DIARRHEA: 0

## 2018-08-15 NOTE — PROGRESS NOTES
[email or web address]  [address] [place] [address],  MODESTO Guzman  Tel: [phone]   Fax: [phone]    Subjective     CC: Right 5th metatarsal fracture     HPI:  Gita Conn is a 34y.o. year old male who presents to clinic today for evaluation of 5th metatarsal fracture, right foot. Patient sustained a 5th metatarsal fracture of the right foot on Thursday 8/9. He was seen in the ED on 8/10. He states he has been non-weightbearing to the right foot with posterior splint. Pain is well controlled except for sudden pain to the medial aspect of his heel that starts at night and is abrupt onset. Resolved by dangling his foot. He kept his dressing clean dry and intact. Has been using a wheel chair as he cannot use crutches due to CRPP of his right 4th metacarpal.       Primary care physician is Darlin Bevrely MD.    ROS:    Constitutional: Reports occasional fevers and chills none currently. Denies nausea/vomiting. Neurologic: Denies numbness, tingling, and burning in the feet. Vascular: Denies symptoms of lower extremity claudication. Skin: Denies open wounds. Otherwise negative except as noted in the HPI.      PMH:  Past Medical History:   Diagnosis Date    GERD (gastroesophageal reflux disease)        Surgical History:   Past Surgical History:   Procedure Laterality Date    FINGER CLOSED REDUCTION  07/25/2018    CLOSED REDUCTION PERC PINNING SMALL AND FINGER FINGER METACARPAL BASE -    [place] OFFICE/OUTPT VISIT,PROCEDURE ONLY Right 7/25/2018    CLOSED REDUCTION PERC PINNING SMALL AND FINGER FINGER METACARPAL BASE - C ARM, SYNTHES performed by Price Saavedra MD at [facility]         Social History:  Social History   Substance Use Topics    Smoking status: Former Smoker     Packs/day: 0.50     Types: Cigarettes     Quit date: 1/18/2018    Smokeless tobacco: Never Used    Alcohol use 0.0 oz/week      Comment: social       Medications:  Prior to Admission medications    Medication Sig Start Date End Date Taking? Authorizing Provider   HYDROcodone-acetaminophen (NORCO) 5-325 MG per tablet Take 1 tablet by mouth every 6 hours as needed for Pain for up to 5 days. Intended supply: 5 days. Take lowest dose possible to manage pain. 8/15/18 8/20/18 Yes Luzmaria Feldman DPM   ibuprofen (ADVIL;MOTRIN) 800 MG tablet Take 1 tablet by mouth every 8 hours as needed for Pain 8/10/18  Yes Reynaldo Hill MD   [facility] [place]. Devices (FREE SPIRIT KNEE/LEG WALKER) MISC Please use walker to mobilize until cleared by podiatry to bear weight on foot. 8/10/18  Yes Reynaldo Hill MD       Objective     Vitals:    08/15/18 1319   BP: 136/87   Pulse: 88       No results found for: LABA1C    Physical Exam:  General:  Alert and oriented x3. In no acute distress. Lower Extremity Physical Exam:    Vascular: DP and PT pulses are palpable, Bilateral. CFT <3 seconds to all digits, Bilateral.  Pitting edema to right foot and ankle. Hair growth is present to the level of the digits, Bilateral.     Neuro: Saph/sural/SP/DP/plantar sensation intact to light touch. Musculoskeletal: EHL/FHL/GS/TA gross motor intact. Tenderness to palpation to base iof right 5th metatarsal, and just inferior to the medial malleolus. Gross deformity is absent, Bilateral.     Dermatologic: No open lesions, Bilateral.  No fracture blisters. There is ecchymosis to lateral aspect of the right foot. Interdigital maceration absent, Bilateral.  Nails 1-10 are normal length and morphology. .      Biomechanical Exam:    Deferred 2/2 acute fracture to 5th metatarsal right foot. Imaging:   Xr Hand Right (min 3 Views)    Result Date: 8/14/2018  EXAMINATION: X-ray right hand TECHNIQUE: Three radiographic views of the right hand. COMPARISON: Intraoperative study 07/25/2018 HISTORY: Benna Rain on right hand, status post previous surgery with K-wires. Pain.  FINDINGS: K-wires are in place as seen on the intraoperative study through the detail  · Xrays reviewed - comminuted, displaced fracture to base of 5th metatarsal. Irregularity consistent with ossicle or chronic vs acute fracture of medial malleolus. Will obtain CT to further assess. · Discussed case with Radiologist regarding medial malleolus, apepars to be chronic old fracture but CT may be ordered for further evaluation to see if it is acute-on-chronic. · Ordered CT of right ankle for evaluation of medial malleolus - patient will get CT today  · Rx Norco for pain   · Hennessy compression with new posterior splint applied as previous posterior splint was broken. · RTC Monday for swelling eval  · Possible surgery Tuesday or Friday pending soft tissue envelope and Dr. Lisa Berg schedule  · Will schedule and consent patient for surgery at next visit. · Will call Dr. Leticia Barnes on Monday after assessing ST swelling. · Patient to RTC on Monday 8/20  · Please, call the office with any questions or concerns     Orders Placed This Encounter   Medications    HYDROcodone-acetaminophen (NORCO) 5-325 MG per tablet     Sig: Take 1 tablet by mouth every 6 hours as needed for Pain for up to 5 days. Intended supply: 5 days. Take lowest dose possible to manage pain.      Dispense:  20 tablet     Refill:  0     Orders Placed This Encounter   Procedures    CT ANKLE RIGHT WO CONTRAST     Standing Status:   Future     Standing Expiration Date:   8/15/2019     Order Specific Question:   Reason for exam:     Answer:   Chronic vs acute fracture of medial malleolus, clinically painful inferior to medial malleolus, poss impingement of tib nerve    63683 - ND APPLY SHORT LEG CAST     Electronically signed by Ashley Gill DPM on 8/15/2018 at 4:48 PM

## 2018-08-15 NOTE — PATIENT INSTRUCTIONS
Keep Right leg elevated above the level of the heart  Take pain medication as directed  Use ibuprofen or tylenol if your pain is not that severe  Keep dressing clean dry and intact  Obtain CT scan of right ankle before next visit  Follow up on Monday for evaluation and surgery scheduling    HYDROcodone-acetaminophen ([address]) 5-325 MG per tablet [[id number]]   Order Details   Dose: 1 tablet Route: Oral Frequency: EVERY 6 HOURS PRN for Pain   Dispense Quantity:  20 tablet Refills:  0 Fills remaining:  --           Sig: Take 1 tablet by mouth every 6 hours as needed for Pain for up to 5 days. Intended supply: 5 days. Take lowest dose possible to manage pain.          Written Date:  08/15/18 Expiration Date:  10/14/18     Start Date:  08/15/18 End Date:  08/20/18     Ordering Provider:  -- Authorizing Provider:  Albin Jimenez DPM Ordering User:  Albin Jimenez DPM          Diagnosis Association: Displaced fracture of fifth metatarsal bone, right foot, initial encounter for closed fracture (S92.351A);  Closed avulsion fracture of medial malleolus of right tibia, initial encounter (S82.51XA)      Pharmacy:  [facility] [id number] [id number]      Pharmacy Comments:  --          Fill quantity remaining:  -- Fill quantity used:  --        Outpatient Morphine Equivalent Daily Dose (MEDD)     8/15/18 - 8/20/18   20 mg MEDD   Order Name Dose Route Frequency Maximum MEDD    HYDROcodone-acetaminophen (NORCO) 5-325 MG per tablet 1 tablet Oral EVERY 6 HOURS PRN 20 mg MEDD   Total Potential Daily Morphine Equivalence 20 mg MEDD   Calculation Information                8/21/18 and after   None   Order Class:     Order Class   Print [12]

## 2018-08-15 NOTE — PROGRESS NOTES
Patient instructed to remove shoes and socks, instructed to sit in exam chair. Current PCP name is Dr. Manfred Loving and date of last visit N/A. Do you have a follow up visit scheduled?   Yes or no    If yes the date is N/A

## 2018-08-23 ENCOUNTER — HOSPITAL ENCOUNTER (OUTPATIENT)
Dept: CT IMAGING | Age: 28
Discharge: HOME | End: 2018-08-25
Payer: COMMERCIAL

## 2018-08-23 DIAGNOSIS — S82.51XA CLOSED AVULSION FRACTURE OF MEDIAL MALLEOLUS OF RIGHT TIBIA, INITIAL ENCOUNTER: ICD-10-CM

## 2018-08-23 PROCEDURE — 73700 CT LOWER EXTREMITY W/O DYE: CPT

## 2018-08-24 ENCOUNTER — OFFICE VISIT (OUTPATIENT)
Dept: PODIATRY | Age: 28
End: 2018-08-24
Payer: COMMERCIAL

## 2018-08-24 VITALS
HEIGHT: 74 IN | BODY MASS INDEX: 32.08 KG/M2 | WEIGHT: 250 LBS | SYSTOLIC BLOOD PRESSURE: 129 MMHG | DIASTOLIC BLOOD PRESSURE: 79 MMHG | HEART RATE: 97 BPM

## 2018-08-24 DIAGNOSIS — S92.351A DISPLACED FRACTURE OF FIFTH METATARSAL BONE, RIGHT FOOT, INITIAL ENCOUNTER FOR CLOSED FRACTURE: Primary | ICD-10-CM

## 2018-08-24 DIAGNOSIS — S82.51XA CLOSED AVULSION FRACTURE OF MEDIAL MALLEOLUS OF RIGHT TIBIA, INITIAL ENCOUNTER: ICD-10-CM

## 2018-08-24 PROCEDURE — 99214 OFFICE O/P EST MOD 30 MIN: CPT | Performed by: STUDENT IN AN ORGANIZED HEALTH CARE EDUCATION/TRAINING PROGRAM

## 2018-08-24 PROCEDURE — 99213 OFFICE O/P EST LOW 20 MIN: CPT | Performed by: STUDENT IN AN ORGANIZED HEALTH CARE EDUCATION/TRAINING PROGRAM

## 2018-08-24 PROCEDURE — G8427 DOCREV CUR MEDS BY ELIG CLIN: HCPCS | Performed by: STUDENT IN AN ORGANIZED HEALTH CARE EDUCATION/TRAINING PROGRAM

## 2018-08-24 PROCEDURE — 1036F TOBACCO NON-USER: CPT | Performed by: STUDENT IN AN ORGANIZED HEALTH CARE EDUCATION/TRAINING PROGRAM

## 2018-08-24 PROCEDURE — G8417 CALC BMI ABV UP PARAM F/U: HCPCS | Performed by: STUDENT IN AN ORGANIZED HEALTH CARE EDUCATION/TRAINING PROGRAM

## 2018-08-24 NOTE — PROGRESS NOTES
[address]  [address] [place] [address],  MODESTO Guzman  Tel: [phone]   Fax: [phone]    Subjective     CC: Right 5th metatarsal fracture     HPI:  Patrick Bush is a 34y.o. year old male who presents to clinic today for evaluation of 5th metatarsal fracture, right foot. Patient sustained a 5th metatarsal fracture of the right foot on Thursday 8/9. He was seen in the ED on 8/10. He states he has been non-weightbearing to the right foot with posterior splint. A CT was ordered last appointment to assess for a medial mal fx. He kept his dressing clean dry and intact. Has been using a wheel chair as he cannot use crutches due to CRPP of his right 4th metacarpal. He is requesting a refill on his Norco. Denies N/F/V/C    Primary care physician is Audra Collado MD.    ROS:    Constitutional: Reports occasional fevers and chills none currently. Denies nausea/vomiting. Neurologic: Denies numbness, tingling, and burning in the feet. Vascular: Denies symptoms of lower extremity claudication. Skin: Denies open wounds. Otherwise negative except as noted in the HPI.      PMH:  Past Medical History:   Diagnosis Date    GERD (gastroesophageal reflux disease)        Surgical History:   Past Surgical History:   Procedure Laterality Date    FINGER CLOSED REDUCTION  07/25/2018    CLOSED REDUCTION PERC PINNING SMALL AND FINGER FINGER METACARPAL BASE -    [place] OFFICE/OUTPT VISIT,PROCEDURE ONLY Right 7/25/2018    CLOSED REDUCTION PERC PINNING SMALL AND FINGER FINGER METACARPAL BASE - C ARM, SYNTHES performed by Dipak King MD at [facility]         Social History:  Social History   Substance Use Topics    Smoking status: Former Smoker     Packs/day: 0.50     Types: Cigarettes     Quit date: 1/18/2018    Smokeless tobacco: Never Used    Alcohol use 0.0 oz/week      Comment: social       Medications:  Prior to Admission medications    Medication [employer] THE RIGHT ANKLE; 3 XRAY VIEWS OF THE RIGHT FOOT 8/10/2018 4:02 pm COMPARISON: None. HISTORY: ORDERING SYSTEM PROVIDED HISTORY: fell, swelling and pain TECHNOLOGIST PROVIDED HISTORY: Reason for exam:->fell, swelling and pain FINDINGS: Right ankle:  Diffuse mild soft tissue swelling is noted. No acute fracture dislocation is noted. Nonacute appearing calcifications are noted adjacent to the medial malleolus. Ankle mortise is uniform. Talar dome demonstrates a small lucency which may represent a focus of osteochondroma spastic cans. There is also small lucency along the medial talar wall, likely a sequelae of remote injury. Right foot:  The patient has a comminuted fracture through the base of the 5th metatarsal bone, intra-articular with 3.8 mm offset. No dislocation is evident. No additional fractures are seen. Moderate soft tissue swelling over the forefoot is noted. Right ankle:  Oft tissue swelling. No acute osseous abnormality. Findings as above. Foot:  Comminuted base of 5th metatarsal bone, intra-articular, with 3 mm offset. Soft tissue swelling. No dislocation is noted. LisFranc relationship is preserved. Eligio Hernandez is a 34 y.o. male with     Diagnosis Orders   1. Displaced fracture of fifth metatarsal bone, right foot, initial encounter for closed fracture     2. Closed avulsion fracture of medial malleolus of right tibia, initial encounter          Plan   · Patient examined and evaluated  · Diagnosis and treatment options discussed in detail  · Xray and CT reviewed   · Stockinette and ACE bandage applied to RLE. Dispensed CAM boot. Pt to remain NWB in wheelchair  · Spoke with Dr. Elliott Rush who will review CT and schedule surgery. Tentative sx on Weds  · Explained to pt that we will refill his noro after sx  · continue to rest, ice and elevate   · Case discussed with Dr. Monique Chou    No orders of the defined types were placed in this encounter.     No orders of the defined

## 2018-08-27 ENCOUNTER — TELEPHONE (OUTPATIENT)
Dept: PODIATRY | Age: 28
End: 2018-08-27

## 2018-08-28 RX ORDER — HYDROCODONE BITARTRATE AND ACETAMINOPHEN 5; 325 MG/1; MG/1
1 TABLET ORAL EVERY 6 HOURS PRN
Status: ON HOLD | COMMUNITY
End: 2018-08-29 | Stop reason: HOSPADM

## 2018-08-29 ENCOUNTER — APPOINTMENT (OUTPATIENT)
Dept: GENERAL RADIOLOGY | Age: 28
End: 2018-08-29
Attending: PODIATRIST
Payer: COMMERCIAL

## 2018-08-29 ENCOUNTER — ANESTHESIA EVENT (OUTPATIENT)
Dept: OPERATING ROOM | Age: 28
End: 2018-08-29
Payer: COMMERCIAL

## 2018-08-29 ENCOUNTER — HOSPITAL ENCOUNTER (OUTPATIENT)
Age: 28
Setting detail: OUTPATIENT SURGERY
Discharge: HOME | End: 2018-08-29
Attending: PODIATRIST | Admitting: PODIATRIST
Payer: COMMERCIAL

## 2018-08-29 ENCOUNTER — ANESTHESIA (OUTPATIENT)
Dept: OPERATING ROOM | Age: 28
End: 2018-08-29
Payer: COMMERCIAL

## 2018-08-29 VITALS — SYSTOLIC BLOOD PRESSURE: 138 MMHG | OXYGEN SATURATION: 100 % | TEMPERATURE: 97 F | DIASTOLIC BLOOD PRESSURE: 66 MMHG

## 2018-08-29 VITALS
BODY MASS INDEX: 32.1 KG/M2 | HEART RATE: 122 BPM | DIASTOLIC BLOOD PRESSURE: 89 MMHG | OXYGEN SATURATION: 98 % | TEMPERATURE: 98.6 F | SYSTOLIC BLOOD PRESSURE: 144 MMHG | HEIGHT: 74 IN | RESPIRATION RATE: 21 BRPM

## 2018-08-29 DIAGNOSIS — G89.18 POST-OP PAIN: Primary | ICD-10-CM

## 2018-08-29 PROCEDURE — 6360000002 HC RX W HCPCS: Performed by: NURSE ANESTHETIST, CERTIFIED REGISTERED

## 2018-08-29 PROCEDURE — 2720000010 HC SURG SUPPLY STERILE: Performed by: PODIATRIST

## 2018-08-29 PROCEDURE — 3600000013 HC SURGERY LEVEL 3 ADDTL 15MIN: Performed by: PODIATRIST

## 2018-08-29 PROCEDURE — 6360000002 HC RX W HCPCS: Performed by: STUDENT IN AN ORGANIZED HEALTH CARE EDUCATION/TRAINING PROGRAM

## 2018-08-29 PROCEDURE — 2500000003 HC RX 250 WO HCPCS: Performed by: NURSE ANESTHETIST, CERTIFIED REGISTERED

## 2018-08-29 PROCEDURE — 7100000011 HC PHASE II RECOVERY - ADDTL 15 MIN: Performed by: PODIATRIST

## 2018-08-29 PROCEDURE — 2709999900 HC NON-CHARGEABLE SUPPLY: Performed by: PODIATRIST

## 2018-08-29 PROCEDURE — 76942 ECHO GUIDE FOR BIOPSY: CPT | Performed by: ANESTHESIOLOGY

## 2018-08-29 PROCEDURE — 2500000003 HC RX 250 WO HCPCS: Performed by: ANESTHESIOLOGY

## 2018-08-29 PROCEDURE — 3700000000 HC ANESTHESIA ATTENDED CARE: Performed by: PODIATRIST

## 2018-08-29 PROCEDURE — 2580000003 HC RX 258: Performed by: PODIATRIST

## 2018-08-29 PROCEDURE — 73620 X-RAY EXAM OF FOOT: CPT

## 2018-08-29 PROCEDURE — 7100000010 HC PHASE II RECOVERY - FIRST 15 MIN: Performed by: PODIATRIST

## 2018-08-29 PROCEDURE — C1713 ANCHOR/SCREW BN/BN,TIS/BN: HCPCS | Performed by: PODIATRIST

## 2018-08-29 PROCEDURE — 3700000001 HC ADD 15 MINUTES (ANESTHESIA): Performed by: PODIATRIST

## 2018-08-29 PROCEDURE — 7100000001 HC PACU RECOVERY - ADDTL 15 MIN: Performed by: PODIATRIST

## 2018-08-29 PROCEDURE — 6360000002 HC RX W HCPCS

## 2018-08-29 PROCEDURE — 6360000002 HC RX W HCPCS: Performed by: ANESTHESIOLOGY

## 2018-08-29 PROCEDURE — 3600000003 HC SURGERY LEVEL 3 BASE: Performed by: PODIATRIST

## 2018-08-29 PROCEDURE — 2580000003 HC RX 258: Performed by: STUDENT IN AN ORGANIZED HEALTH CARE EDUCATION/TRAINING PROGRAM

## 2018-08-29 PROCEDURE — 64447 NJX AA&/STRD FEMORAL NRV IMG: CPT | Performed by: ANESTHESIOLOGY

## 2018-08-29 PROCEDURE — 7100000000 HC PACU RECOVERY - FIRST 15 MIN: Performed by: PODIATRIST

## 2018-08-29 DEVICE — BABY GORILLA, PLATE, HOOK 05 HOLE, W/ COMP, RIGHT, 1.2MM, TIA
Type: IMPLANTABLE DEVICE | Site: FOOT | Status: FUNCTIONAL
Brand: BABY GORILLA/GORILLA PLATING SYSTEM

## 2018-08-29 DEVICE — P28, K-WIRE, 1.6 X 150 MM, SINGLE TROCAR, SMOOTH, SS
Type: IMPLANTABLE DEVICE | Site: FOOT | Status: FUNCTIONAL
Brand: MULTI SYSTEM

## 2018-08-29 RX ORDER — KETOROLAC TROMETHAMINE 30 MG/ML
INJECTION, SOLUTION INTRAMUSCULAR; INTRAVENOUS PRN
Status: DISCONTINUED | OUTPATIENT
Start: 2018-08-29 | End: 2018-08-29 | Stop reason: SDUPTHER

## 2018-08-29 RX ORDER — MIDAZOLAM HYDROCHLORIDE 1 MG/ML
INJECTION INTRAMUSCULAR; INTRAVENOUS
Status: COMPLETED
Start: 2018-08-29 | End: 2018-08-29

## 2018-08-29 RX ORDER — OXYCODONE HYDROCHLORIDE AND ACETAMINOPHEN 5; 325 MG/1; MG/1
1 TABLET ORAL EVERY 6 HOURS PRN
Qty: 28 TABLET | Refills: 0 | Status: SHIPPED | OUTPATIENT
Start: 2018-08-29 | End: 2018-09-05 | Stop reason: SDUPTHER

## 2018-08-29 RX ORDER — ONDANSETRON 2 MG/ML
INJECTION INTRAMUSCULAR; INTRAVENOUS PRN
Status: DISCONTINUED | OUTPATIENT
Start: 2018-08-29 | End: 2018-08-29 | Stop reason: SDUPTHER

## 2018-08-29 RX ORDER — SODIUM CHLORIDE, SODIUM LACTATE, POTASSIUM CHLORIDE, CALCIUM CHLORIDE 600; 310; 30; 20 MG/100ML; MG/100ML; MG/100ML; MG/100ML
INJECTION, SOLUTION INTRAVENOUS CONTINUOUS
Status: DISCONTINUED | OUTPATIENT
Start: 2018-08-29 | End: 2018-08-29 | Stop reason: HOSPADM

## 2018-08-29 RX ORDER — MAGNESIUM HYDROXIDE 1200 MG/15ML
LIQUID ORAL CONTINUOUS PRN
Status: DISCONTINUED | OUTPATIENT
Start: 2018-08-29 | End: 2018-08-29 | Stop reason: HOSPADM

## 2018-08-29 RX ORDER — LIDOCAINE HYDROCHLORIDE 10 MG/ML
INJECTION, SOLUTION EPIDURAL; INFILTRATION; INTRACAUDAL; PERINEURAL PRN
Status: DISCONTINUED | OUTPATIENT
Start: 2018-08-29 | End: 2018-08-29 | Stop reason: SDUPTHER

## 2018-08-29 RX ORDER — ONDANSETRON 2 MG/ML
4 INJECTION INTRAMUSCULAR; INTRAVENOUS EVERY 6 HOURS PRN
Status: DISCONTINUED | OUTPATIENT
Start: 2018-08-29 | End: 2018-08-29 | Stop reason: HOSPADM

## 2018-08-29 RX ORDER — FENTANYL CITRATE 50 UG/ML
INJECTION, SOLUTION INTRAMUSCULAR; INTRAVENOUS PRN
Status: DISCONTINUED | OUTPATIENT
Start: 2018-08-29 | End: 2018-08-29 | Stop reason: SDUPTHER

## 2018-08-29 RX ORDER — PROPOFOL 10 MG/ML
INJECTION, EMULSION INTRAVENOUS PRN
Status: DISCONTINUED | OUTPATIENT
Start: 2018-08-29 | End: 2018-08-29 | Stop reason: SDUPTHER

## 2018-08-29 RX ORDER — FENTANYL CITRATE 50 UG/ML
INJECTION, SOLUTION INTRAMUSCULAR; INTRAVENOUS
Status: COMPLETED
Start: 2018-08-29 | End: 2018-08-29

## 2018-08-29 RX ADMIN — PROPOFOL 100 MG: 10 INJECTION, EMULSION INTRAVENOUS at 18:21

## 2018-08-29 RX ADMIN — PROPOFOL 200 MG: 10 INJECTION, EMULSION INTRAVENOUS at 17:05

## 2018-08-29 RX ADMIN — HYDROMORPHONE HYDROCHLORIDE 0.5 MG: 1 INJECTION, SOLUTION INTRAMUSCULAR; INTRAVENOUS; SUBCUTANEOUS at 19:56

## 2018-08-29 RX ADMIN — ONDANSETRON 4 MG: 2 INJECTION INTRAMUSCULAR; INTRAVENOUS at 19:51

## 2018-08-29 RX ADMIN — DEXTROSE MONOHYDRATE 2 G: 50 INJECTION, SOLUTION INTRAVENOUS at 17:18

## 2018-08-29 RX ADMIN — SODIUM CHLORIDE, POTASSIUM CHLORIDE, SODIUM LACTATE AND CALCIUM CHLORIDE: 600; 310; 30; 20 INJECTION, SOLUTION INTRAVENOUS at 15:55

## 2018-08-29 RX ADMIN — FENTANYL CITRATE 50 MCG: 50 INJECTION INTRAMUSCULAR; INTRAVENOUS at 18:58

## 2018-08-29 RX ADMIN — MIDAZOLAM HYDROCHLORIDE 2 MG: 1 INJECTION, SOLUTION INTRAMUSCULAR; INTRAVENOUS at 16:45

## 2018-08-29 RX ADMIN — HYDROMORPHONE HYDROCHLORIDE 0.5 MG: 1 INJECTION, SOLUTION INTRAMUSCULAR; INTRAVENOUS; SUBCUTANEOUS at 20:08

## 2018-08-29 RX ADMIN — Medication 500 ML: at 20:16

## 2018-08-29 RX ADMIN — PROPOFOL 200 MG: 10 INJECTION, EMULSION INTRAVENOUS at 17:07

## 2018-08-29 RX ADMIN — FENTANYL CITRATE 100 MCG: 50 INJECTION, SOLUTION INTRAMUSCULAR; INTRAVENOUS at 16:45

## 2018-08-29 RX ADMIN — PROPOFOL 50 MG: 10 INJECTION, EMULSION INTRAVENOUS at 18:35

## 2018-08-29 RX ADMIN — ONDANSETRON 4 MG: 2 INJECTION, SOLUTION INTRAMUSCULAR; INTRAVENOUS at 17:16

## 2018-08-29 RX ADMIN — FENTANYL CITRATE 50 MCG: 50 INJECTION INTRAMUSCULAR; INTRAVENOUS at 18:56

## 2018-08-29 RX ADMIN — LIDOCAINE HYDROCHLORIDE 50 MG: 10 INJECTION, SOLUTION EPIDURAL; INFILTRATION; INTRACAUDAL; PERINEURAL at 17:04

## 2018-08-29 RX ADMIN — KETOROLAC TROMETHAMINE 30 MG: 30 INJECTION, SOLUTION INTRAMUSCULAR; INTRAVENOUS at 17:16

## 2018-08-29 ASSESSMENT — PULMONARY FUNCTION TESTS
PIF_VALUE: 4
PIF_VALUE: 3
PIF_VALUE: 2
PIF_VALUE: 3
PIF_VALUE: 3
PIF_VALUE: 4
PIF_VALUE: 11
PIF_VALUE: 2
PIF_VALUE: 2
PIF_VALUE: 3
PIF_VALUE: 3
PIF_VALUE: 2
PIF_VALUE: 4
PIF_VALUE: 4
PIF_VALUE: 2
PIF_VALUE: 2
PIF_VALUE: 7
PIF_VALUE: 2
PIF_VALUE: 3
PIF_VALUE: 2
PIF_VALUE: 3
PIF_VALUE: 2
PIF_VALUE: 2
PIF_VALUE: 3
PIF_VALUE: 2
PIF_VALUE: 2
PIF_VALUE: 4
PIF_VALUE: 4
PIF_VALUE: 3
PIF_VALUE: 7
PIF_VALUE: 2
PIF_VALUE: 3
PIF_VALUE: 2
PIF_VALUE: 13
PIF_VALUE: 2
PIF_VALUE: 2
PIF_VALUE: 3
PIF_VALUE: 2
PIF_VALUE: 6
PIF_VALUE: 2
PIF_VALUE: 2
PIF_VALUE: 3
PIF_VALUE: 2
PIF_VALUE: 2
PIF_VALUE: 3
PIF_VALUE: 4
PIF_VALUE: 3
PIF_VALUE: 2
PIF_VALUE: 3
PIF_VALUE: 5
PIF_VALUE: 4
PIF_VALUE: 1
PIF_VALUE: 4
PIF_VALUE: 2
PIF_VALUE: 5
PIF_VALUE: 2
PIF_VALUE: 4
PIF_VALUE: 3
PIF_VALUE: 3
PIF_VALUE: 2
PIF_VALUE: 3
PIF_VALUE: 3
PIF_VALUE: 7
PIF_VALUE: 3
PIF_VALUE: 2
PIF_VALUE: 2
PIF_VALUE: 3
PIF_VALUE: 3
PIF_VALUE: 2
PIF_VALUE: 7
PIF_VALUE: 2
PIF_VALUE: 1
PIF_VALUE: 4
PIF_VALUE: 2
PIF_VALUE: 7
PIF_VALUE: 4
PIF_VALUE: 2
PIF_VALUE: 3
PIF_VALUE: 3
PIF_VALUE: 7
PIF_VALUE: 3
PIF_VALUE: 1
PIF_VALUE: 3
PIF_VALUE: 2
PIF_VALUE: 2
PIF_VALUE: 3
PIF_VALUE: 4
PIF_VALUE: 2
PIF_VALUE: 3
PIF_VALUE: 3
PIF_VALUE: 2
PIF_VALUE: 3
PIF_VALUE: 2
PIF_VALUE: 2
PIF_VALUE: 3
PIF_VALUE: 2

## 2018-08-29 ASSESSMENT — PAIN SCALES - GENERAL
PAINLEVEL_OUTOF10: 8
PAINLEVEL_OUTOF10: 0
PAINLEVEL_OUTOF10: 5
PAINLEVEL_OUTOF10: 1
PAINLEVEL_OUTOF10: 7
PAINLEVEL_OUTOF10: 0
PAINLEVEL_OUTOF10: 8
PAINLEVEL_OUTOF10: 3
PAINLEVEL_OUTOF10: 0
PAINLEVEL_OUTOF10: 5

## 2018-08-29 ASSESSMENT — PAIN - FUNCTIONAL ASSESSMENT: PAIN_FUNCTIONAL_ASSESSMENT: 0-10

## 2018-08-29 NOTE — H&P
History and Physical    Pt Name: Tarun Beyer  MRN: [id number]  YOB: 1990  Date of evaluation: 8/29/2018  Primary Care Physician: Pao Gonzalez MD    SUBJECTIVE:   History of Chief Complaint:    Tarun Beyer is a 34 y.o. male who is scheduled for OPEN REDUCTION INTERNAL FIXATION 5TH METATARSAL, POSSIBLE MEDIAL MALLEOLUS ORIF- RIGHT. He reports stepping on a curb \"weird\" and fell fracturing his right foot 3 weeks ago. He was splinted and then changed over to a fracture boot earlier this week. He rates his right foot pain a 5/10 at present. Allergies  has No Known Allergies. Medications  Prior to Admission medications    Medication Sig Start Date End Date Taking? Authorizing Provider   HYDROcodone-acetaminophen (NORCO) 5-325 MG per tablet Take 1 tablet by mouth every 6 hours as needed for Pain. Enid Ramos Historical Provider, MD   [facility] [place]. Devices (FREE SPIRIT KNEE/LEG WALKER) MISC Please use walker to mobilize until cleared by podiatry to bear weight on foot. 8/10/18   Terrie Sheets MD     Past Medical History    has a past medical history of Foot fracture, right; GERD (gastroesophageal reflux disease); and Hand fracture, right. Past Surgical History   has a past surgical history that includes [place] tooth extraction; Finger Closed Reduction (07/25/2018); pr office/outpt visit,procedure only (Right, 7/25/2018); and Upper gastrointestinal endoscopy. Social History   reports that he quit smoking about 7 months ago. His smoking use included Cigarettes. He has a 1.50 pack-year smoking history. He has never used smokeless tobacco.   reports that he drinks alcohol. reports that he does not use drugs.   Marital Status single  Children none  Occupation [employer] but currently not working  Family History  Family Status   Relation Status    Mother Alive    Father Alive    Neg Hx (Not Specified)     family history includes Diabetes in his mother; Hypertension in his father; Mental Illness in his

## 2018-08-29 NOTE — ANESTHESIA PROCEDURE NOTES
Peripheral Block    Patient location during procedure: pre-op  Staffing  Anesthesiologist: Latoya Cabezas  Performed: anesthesiologist   Preanesthetic Checklist  Completed: patient identified, site marked, surgical consent, pre-op evaluation, timeout performed, IV checked, risks and benefits discussed, monitors and equipment checked, anesthesia consent given, oxygen available and patient being monitored  Peripheral Block  Patient position: supine  Prep: ChloraPrep  Patient monitoring: cardiac monitor, continuous pulse ox, frequent blood pressure checks and IV access  Block type: Sciatic  Laterality: right  Injection technique: catheter  Procedures: ultrasound guided  Local infiltration: lidocaine  Infiltration strength: 1 %  Dose: 3 mL  Popliteal  Provider prep: mask and sterile gloves  Local infiltration: lidocaine  Needle  Needle type: Tuohy   Needle gauge: 18 G  Needle length: 10 cm  Needle localization: ultrasound guidance  Catheter type: open end  Catheter size: 20 G  Assessment  Injection assessment: negative aspiration for heme, no paresthesia on injection and local visualized surrounding nerve on ultrasound  Paresthesia pain: none  Slow fractionated injection: yes  Hemodynamics: stable  Additional Notes  Immediately prior to procedure a \"time out\" was called to verify the correct patient, allergies, laterality, procedure and equipment. Time out performed with Rebecca BENNETT    Local Anesthetic: 0.5%  Bupivacaine   Amount: 20 ml  in 5 ml increments after negative aspiration each time.         Reason for block: post-op pain management and at surgeon's request

## 2018-08-29 NOTE — ANESTHESIA PRE PROCEDURE
Department of Anesthesiology  Preprocedure Note       Name:  Cary Spencer   Age:  34 y.o.  :  1990                                          MRN:  [id number]         Date:  2018      Surgeon: Alie Rodrigues):  Gustavo Rider DPM    Procedure: Procedure(s):  OPEN REDUCTION INTERNAL FIXATION 5TH METATARSAL, POSSIBLE MEDIAL MALLEOLUS ORIF  (PARAGON 28 - JOYCE SLOAT)    Medications prior to admission:   Prior to Admission medications    Medication Sig Start Date End Date Taking? Authorizing Provider   HYDROcodone-acetaminophen (NORCO) 5-325 MG per tablet Take 1 tablet by mouth every 6 hours as needed for Pain. Kingston Greer Historical Provider, MD   Misc. Devices (FREE SPIRIT KNEE/LEG WALKER) MISC Please use walker to mobilize until cleared by podiatry to bear weight on foot. 8/10/18   Hira Mata MD       Current medications:    No current facility-administered medications for this encounter.         Allergies:  No Known Allergies    Problem List:    Patient Active Problem List   Diagnosis Code    Closed displaced fracture of base of fourth metacarpal bone of right hand S62.314A       Past Medical History:        Diagnosis Date    Foot fracture, right 2018    GERD (gastroesophageal reflux disease)     no longer on RX    Hand fracture, right 2018       Past Surgical History:        Procedure Laterality Date    FINGER CLOSED REDUCTION  2018    CLOSED REDUCTION PERC PINNING SMALL AND FINGER FINGER METACARPAL BASE -    [place] OFFICE/OUTPT VISIT,PROCEDURE ONLY Right 2018    CLOSED REDUCTION PERC PINNING SMALL AND FINGER FINGER METACARPAL BASE - C ARM, SYNTHES performed by Nicole Almeida MD at [facility]. [address] ENDOSCOPY      WISDOM TOOTH EXTRACTION         Social History:    Social History   Substance Use Topics    Smoking status: Former Smoker     Packs/day: 0.50     Years: 3.00     Types: Cigarettes     Quit date: 2018    Smokeless tobacco: Never Used    Alcohol Pulmonary ROS and normal exam                               Cardiovascular:  Exercise tolerance: good (>4 METS),            Beta Blocker:  Not on Beta Blocker         Neuro/Psych:   Negative Neuro/Psych ROS              GI/Hepatic/Renal:   (+) GERD:,           Endo/Other: Negative Endo/Other ROS       (-) hypothyroidism, hyperthyroidism               Abdominal:           Vascular: negative vascular ROS. Anesthesia Plan      general and regional     ASA 2     (GA LMA)  Induction: intravenous. MIPS: Postoperative opioids intended and Prophylactic antiemetics administered. Anesthetic plan and risks discussed with patient.       Plan discussed with CRNA and surgical team.                Bishop Darrel MD   8/29/2018

## 2018-09-02 NOTE — OP NOTE
PATIENT NAME: Alix lAmeida  YOB: 1990  -  34 y.o. male  MRN: [id number]  DATE: 8/29/2018  BILLING #: [id number]     Surgeon(s):  Jurgen Silverio DPM      ASSISTANTS: Elif Lua DPM PGY2 and Leslie Neff PGY3     PRE-OP DIAGNOSIS:   1. Fifth metatarsal fracture, right foot      POST-OP DIAGNOSIS: Same as above.     PROCEDURE:   1. Open reduction internal fixation of fifth metatarsal, right foot      ANESTHESIA: MAC with popliteal block      HEMOSTASIS: Pneumatic thigh tourniquet @ 300 mmHg for 91 minutes.     ESTIMATED BLOOD LOSS: Less than 15cc.     MATERIALS: 2-0 vicryl, 4-0 vicryl, 4-0 nylon, 5th met hook plate, and 5 screws as listed below.      Implant Name Type Inv. Item Serial No.  Lot No. LRB No. Used   5TH MET HOOK PLATE       PARAGON 28   Right 1   SCREW BG NL 2.5X16MM Screw/Plate/Nail/[place] SCREW BG NL 2.5X16MM   [employer]ON 28   Right 1   SCREW BG LK 2.5X16MM Screw/Plate/Nail/[place] SCREW BG LK 2.5X16MM   [employer]ON 28   Right 1   2.5 X 15MM LOCKING PLATE SCREW       [employer] 28   Right 1   2.5 X 10MM LOCKING PLATE SCREW       [employer]ON 28   Right 1   2.5 X 32 M NON-LOCKING PLATE SCREW       PARAGON 28   Right 1         INJECTABLES: None     SPECIMEN: None     COMPLICATIONS: None     FINDINGS: Fracture with minimal callus. Fresh bleeding bone edges with anatomic reduction. Plate fixation with eccentric compression through hook plate. INDICATIONS FOR PROCEDURE: Alix Almeida is a 34 y.o. male well known to Dr. Elliott Rush with a CC of 5th metatarsal fracture right and has failed conservative treatment. Dr. Elliott Rush has recommended surgical treatment to which the patient is amenable. In pre-op all risks and rewards of the aforementioned procedure were discussed at length prior to the patient signing the consent form which was witnessed by a nurse and placed in the chart. The right foot was marked, abx hung, labs and images reviewed, NPO status confirmed. No guarantees were given or implied. screw hole just distal to the fracture site. A 32 mm nonlocking screw was inserted into the base of the fifth metatarsal in between the occipital plate per  instructions. Final C-arm images were obtained. The surgical site was then copiously irrigated using sterile saline. Deep closure performed using 2-0 Vicryl. Subcu is closure performed using 4-0 Vicryl. Skin closure performed using 4-0 nylon. The tourniquet was deflated after 91 minutes. Immediate hyperemia was noted to the digits of the right foot. The surgical site was dressed using Adaptic, 4 x 4's, Kerlix, and Ace. A posterior splint was then applied. The patient was then transferred to the recovery room with all vital signs stable and vascular status intact to the foot and ankle. Following a period of postoperative monitoring the patient will be discharged to home with Percocet and Lovenox. The patient is to follow-up with Dr. Jamie Fontana within one week's time. He is to be nonweightbearing to the right foot.     Electronically signed by Ayah Willett DPM on 9/1/2018 at 11:44 PM

## 2018-09-05 ENCOUNTER — TELEPHONE (OUTPATIENT)
Dept: PODIATRY | Age: 28
End: 2018-09-05

## 2018-09-05 ENCOUNTER — HOSPITAL ENCOUNTER (OUTPATIENT)
Age: 28
Discharge: HOME | End: 2018-09-05
Payer: COMMERCIAL

## 2018-09-05 ENCOUNTER — OFFICE VISIT (OUTPATIENT)
Dept: PODIATRY | Age: 28
End: 2018-09-05
Payer: COMMERCIAL

## 2018-09-05 VITALS
WEIGHT: 250 LBS | HEIGHT: 74 IN | DIASTOLIC BLOOD PRESSURE: 73 MMHG | HEART RATE: 92 BPM | SYSTOLIC BLOOD PRESSURE: 118 MMHG | BODY MASS INDEX: 32.08 KG/M2

## 2018-09-05 DIAGNOSIS — Z98.890 S/P FOOT SURGERY, RIGHT: Primary | ICD-10-CM

## 2018-09-05 DIAGNOSIS — Z98.890 S/P FOOT SURGERY, RIGHT: ICD-10-CM

## 2018-09-05 DIAGNOSIS — G89.18 POST-OP PAIN: ICD-10-CM

## 2018-09-05 LAB
ABSOLUTE EOS #: 0.22 K/UL (ref 0–0.44)
ABSOLUTE IMMATURE GRANULOCYTE: 0.06 K/UL (ref 0–0.3)
ABSOLUTE LYMPH #: 1.52 K/UL (ref 1.1–3.7)
ABSOLUTE MONO #: 0.82 K/UL (ref 0.1–1.2)
BASOPHILS # BLD: 1 % (ref 0–2)
BASOPHILS ABSOLUTE: 0.05 K/UL (ref 0–0.2)
DIFFERENTIAL TYPE: ABNORMAL
EOSINOPHILS RELATIVE PERCENT: 2 % (ref 1–4)
HCT VFR BLD CALC: 43.4 % (ref 40.7–50.3)
HEMOGLOBIN: 14.6 G/DL (ref 13–17)
IMMATURE GRANULOCYTES: 1 %
LYMPHOCYTES # BLD: 15 % (ref 24–43)
MCH RBC QN AUTO: 30.4 PG (ref 25.2–33.5)
MCHC RBC AUTO-ENTMCNC: 33.6 G/DL (ref 28.4–34.8)
MCV RBC AUTO: 90.4 FL (ref 82.6–102.9)
MONOCYTES # BLD: 8 % (ref 3–12)
NRBC AUTOMATED: 0 PER 100 WBC
PARTIAL THROMBOPLASTIN TIME: 27 SEC (ref 20.5–30.5)
PDW BLD-RTO: 11.9 % (ref 11.8–14.4)
PLATELET # BLD: 391 K/UL (ref 138–453)
PLATELET ESTIMATE: ABNORMAL
PMV BLD AUTO: 9.5 FL (ref 8.1–13.5)
RBC # BLD: 4.8 M/UL (ref 4.21–5.77)
RBC # BLD: ABNORMAL 10*6/UL
SEG NEUTROPHILS: 73 % (ref 36–65)
SEGMENTED NEUTROPHILS ABSOLUTE COUNT: 7.43 K/UL (ref 1.5–8.1)
WBC # BLD: 10.1 K/UL (ref 3.5–11.3)
WBC # BLD: ABNORMAL 10*3/UL

## 2018-09-05 PROCEDURE — 99024 POSTOP FOLLOW-UP VISIT: CPT | Performed by: STUDENT IN AN ORGANIZED HEALTH CARE EDUCATION/TRAINING PROGRAM

## 2018-09-05 PROCEDURE — 99213 OFFICE O/P EST LOW 20 MIN: CPT | Performed by: STUDENT IN AN ORGANIZED HEALTH CARE EDUCATION/TRAINING PROGRAM

## 2018-09-05 PROCEDURE — 36415 COLL VENOUS BLD VENIPUNCTURE: CPT

## 2018-09-05 PROCEDURE — 85025 COMPLETE CBC W/AUTO DIFF WBC: CPT

## 2018-09-05 PROCEDURE — 85730 THROMBOPLASTIN TIME PARTIAL: CPT

## 2018-09-05 RX ORDER — OXYCODONE HYDROCHLORIDE AND ACETAMINOPHEN 5; 325 MG/1; MG/1
1 TABLET ORAL EVERY 6 HOURS PRN
Qty: 28 TABLET | Refills: 0 | Status: SHIPPED | OUTPATIENT
Start: 2018-09-05 | End: 2018-09-12

## 2018-09-05 RX ORDER — ACETAMINOPHEN 325 MG/1
650 TABLET ORAL EVERY 6 HOURS PRN
Qty: 120 TABLET | Refills: 3 | Status: SHIPPED | OUTPATIENT
Start: 2018-09-05 | End: 2018-09-05 | Stop reason: SDUPTHER

## 2018-09-05 RX ORDER — ACETAMINOPHEN 325 MG/1
650 TABLET ORAL EVERY 6 HOURS PRN
Qty: 120 TABLET | Refills: 3 | Status: SHIPPED | OUTPATIENT
Start: 2018-09-05 | End: 2018-12-03

## 2018-09-05 NOTE — PROGRESS NOTES
deformity is absent, Bilateral. POP to medial mal. POP along peroneal     Dermatologic: Approximately 4 cm well coapted incision noted to dorsal lateral fifth metatarsal base with all sutures present. There is moderate active blood oozing from central aspect of incision. The losing subsides with elevation and compression. There is +3 pitting edema noted to the right foot. There does not appear to be any signs of deep infection, hematoma noted to surgical site. There are no signs of dehiscence noted at this time to surgical incision. Biomechanical Exam:    Deferred 2/2 acute fracture to 5th metatarsal right foot. Diana Haines is a 34 y.o. male with     Diagnosis Orders   1. S/P foot surgery, right  CBC With Auto Differential    APTT    DME Order for Walker as OP    acetaminophen (AMINOFEN) 325 MG tablet    DISCONTINUED: acetaminophen (AMINOFEN) 325 MG tablet   2. Post-op pain  oxyCODONE-acetaminophen (PERCOCET) 5-325 MG per tablet        Plan   · Patient examined and evaluated with Dr. Yesica Epperson  · Diagnosis and treatment options discussed in detail  · Dressing changed to RLE. Adaptic, 4x4s, kerlix, ACE, [place] with posterior splint applied. Discussed with patient he can remove the posterior splint when he gets home and apply the CAM walking boot. · Discussed with patient to hold the Lovenox for now as patient was actively oozing from surgical site- no active pulsatile arterial bleeders noted. · Discussed discussed with patient that he can start passive range of motion of right ankle. · Keep dressing clean dry and intact. · Ordered CBC, APTT  · Rx walker with seat  · Refill Percocet  · Patient instructed to return to the emergency room or call clinic immediately if there is any evidence of strikethrough noted to dressing.   · RTC on Monday 9/10/18    Orders Placed This Encounter   Medications    DISCONTD: acetaminophen (AMINOFEN) 325 MG tablet     Sig: Take 2 tablets by mouth every 6 hours as needed for Pain     Dispense:  120 tablet     Refill:  3    acetaminophen (AMINOFEN) 325 MG tablet     Sig: Take 2 tablets by mouth every 6 hours as needed for Pain     Dispense:  120 tablet     Refill:  3    oxyCODONE-acetaminophen (PERCOCET) 5-325 MG per tablet     Sig: Take 1 tablet by mouth every 6 hours as needed for Pain for up to 7 days. Intended supply: 7 days. Take lowest dose possible to manage pain. Dispense:  28 tablet     Refill:  0     Orders Placed This Encounter   Procedures    CBC With Auto Differential     Standing Status:   Future     Number of Occurrences:   1     Standing Expiration Date:   9/5/2019    APTT     Standing Status:   Future     Number of Occurrences:   1     Standing Expiration Date:   9/5/2019     Order Specific Question:   Daily Heparin Dose? Answer:   40 mg lovenox daily    DME Order for Walker as OP     You must complete the order parameters below and add the medical necessity documentation for this DME in a separate note.     Folding Walker with Wheels and Seat    Current patient weight: Weight: 250 lb (113.4 kg)  Current patient height: Height: 6' 2\" (188 cm)  Diagnosis: s/p right foot surgery  Duration: Purchase     Electronically signed by Jennifer Dent DPM on 9/5/2018 at 3:35 PM

## 2018-09-05 NOTE — PROGRESS NOTES
Patient instructed to remove shoes and socks, instructed to sit in exam chair. Current PCP name is  and date of last visit 8/12/18. Do you have a follow up visit scheduled?   no

## 2018-09-05 NOTE — PATIENT INSTRUCTIONS
acetaminophen (AMINOFEN) 325 MG tablet [[id number]]   Order Details   Dose: 650 mg Route: Oral Frequency: EVERY 6 HOURS PRN for Pain   Dispense Quantity:  120 tablet Refills:  3 Fills remaining:  --           Sig: Take 2 tablets by mouth every 6 hours as needed for Pain          Written Date:  18 Expiration Date:  19     Start Date:  18 End Date:  --     Ordering Provider:  -- Authorizing Provider:  Jennifer Dent DPM Ordering User:  Jennifer Dent DPM          Diagnosis Association: S/P foot surgery, right (F17.660)      Original Order:  acetaminophen (Francia Fair) 325 MG tablet [[id number]]      Pharmacy:  [facility] [address] [place] [address], [address] [place] [address]      Pharmacy Comments:  --          Fill quantity remaining:  -- Fill quantity used:  --        Order Class:     Order Class   Print [12]   Warnings History     No Interaction Warnings Shown    Order Audit Trail     Number of times this order has been changed since signin   Order Audit [place]   acetaminophen (AMINOFEN) 325 MG tablet   Date: 2018 Department: px Acc Podiatry Ordering/Authorizing: Jennifer Dent DPM   Medication Detail      Disp Refills Start End    acetaminophen (AMINOFEN) 325 MG tablet 120 tablet 3 2018     Sig - Route: Take 2 tablets by mouth every 6 hours as needed for Pain - Oral      Medication   oxyCODONE-acetaminophen (PERCOCET) 5-325 MG per tablet [[id number]]   oxyCODONE-acetaminophen (PERCOCET) 5-325 MG per tablet [[id number]]   Order Details   Dose: 1 tablet Route: Oral Frequency: EVERY 6 HOURS PRN for Pain   Dispense Quantity:  28 tablet Refills:  0 Fills remaining:  --           Sig: Take 1 tablet by mouth every 6 hours as needed for Pain for up to 7 days. Intended supply: 7 days.  Take lowest dose possible to manage pain.          Written Date:  18 Expiration Date:  18     Start Date:  18 End Date:  18     Ordering Provider:  -- Authorizing Provider:  Alejandro Quigley Ethan Cadena, [place] Ordering User:  Italia Ruelas DPM          Diagnosis Association: Post-op pain (G89.18)      Original Order:  oxyCODONE-acetaminophen (PERCOCET) 5-325 MG per tablet [[id number]]      Pharmacy:  [facility] [address] [place] [address], [address] [place] [address]      Pharmacy Comments:  --          Fill quantity remaining:  -- Fill quantity used:  --        Outpatient Morphine Equivalent Daily Dose (MEDD)     9/5/18 - 9/12/18   30 mg MEDD   Order Name Dose Route Frequency Maximum MEDD    oxyCODONE-acetaminophen (PERCOCET) 5-325 MG per tablet 1 tablet Oral EVERY 6 HOURS PRN 30 mg MEDD   Total Potential Daily Morphine Equivalence 30 mg MEDD   Calculation Information                9/13/18 and after   None   Order Class:     Order Class   Print [12]     oxyCODONE-acetaminophen (PERCOCET) 5-325 MG per tablet [[id number]]   Order Details   Dose: 1 tablet Route: Oral Frequency: EVERY 6 HOURS PRN for Pain   Dispense Quantity:  28 tablet Refills:  0 Fills remaining:  --           Sig: Take 1 tablet by mouth every 6 hours as needed for Pain for up to 7 days. Intended supply: 7 days.  Take lowest dose possible to manage pain.          Written Date:  09/05/18 Expiration Date:  11/04/18     Start Date:  09/05/18 End Date:  09/12/18     Ordering Provider:  -- Authorizing Provider:  Italia Ruelas DPM Ordering User:  Italia Ruelas DPM          Diagnosis Association: Post-op pain (G89.18)      Original Order:  oxyCODONE-acetaminophen (PERCOCET) 5-325 MG per tablet [[id number]]      Pharmacy:  [facility] [address] [place] [address], [address] [place] [address] Comments:  --          Fill quantity remaining:  -- Fill quantity used:  --        Outpatient Morphine Equivalent Daily Dose (MEDD)     9/5/18 - 9/12/18   30 mg MEDD   Order Name Dose Route Frequency Maximum MEDD    oxyCODONE-acetaminophen (PERCOCET) 5-325 MG per tablet 1 tablet Oral EVERY 6 HOURS PRN 30 mg MEDD Total Potential Daily Morphine Equivalence 30 mg MEDD   Calculation Information                18 and after   None   Order Class:     Order Class   Print [12]   Medication Administration Instructions     Intended supply: 7 days. Take lowest dose possible to manage pain   Warnings History     No Interaction Warnings Shown    Order Audit Trail     Number of times this order has been changed since signin   Order Audit [place]   oxyCODONE-acetaminophen (PERCOCET) 5-325 MG per tablet   Date: 2018 Department: px Acc Podiatry Ordering/Authorizing: Raman Horne DPM   Medication Detail      Disp Refills Start End    oxyCODONE-acetaminophen (PERCOCET) 5-325 MG per tablet 28 tablet 0 2018    Sig - Route: Take 1 tablet by mouth every 6 hours as needed for Pain for up to 7 days. Intended supply: 7 days.  Take lowest dose possible to manage pain. - Oral

## 2018-09-05 NOTE — TELEPHONE ENCOUNTER
Writer called pt asking how was his appointment today and were all questions answered. Pt stated appointment went good and all questions answered. \"I just had to get blood work done\".  Verbal understanding given call ended

## 2018-09-24 ENCOUNTER — HOSPITAL ENCOUNTER (INPATIENT)
Age: 28
LOS: 4 days | Discharge: NURSING, REHAB OR LONG-TERM CARE | DRG: 560 | End: 2018-09-28
Attending: PODIATRIST | Admitting: PODIATRIST
Payer: COMMERCIAL

## 2018-09-24 ENCOUNTER — OFFICE VISIT (OUTPATIENT)
Dept: PODIATRY | Age: 28
End: 2018-09-24
Payer: COMMERCIAL

## 2018-09-24 ENCOUNTER — APPOINTMENT (OUTPATIENT)
Dept: GENERAL RADIOLOGY | Age: 28
DRG: 560 | End: 2018-09-24
Attending: PODIATRIST
Payer: COMMERCIAL

## 2018-09-24 ENCOUNTER — HOSPITAL ENCOUNTER (OUTPATIENT)
Age: 28
Setting detail: SPECIMEN
Discharge: HOME | End: 2018-09-24
Payer: COMMERCIAL

## 2018-09-24 VITALS
HEART RATE: 92 BPM | HEIGHT: 74 IN | SYSTOLIC BLOOD PRESSURE: 136 MMHG | WEIGHT: 250 LBS | BODY MASS INDEX: 32.08 KG/M2 | DIASTOLIC BLOOD PRESSURE: 82 MMHG

## 2018-09-24 DIAGNOSIS — Z98.890 S/P FOOT SURGERY, RIGHT: ICD-10-CM

## 2018-09-24 DIAGNOSIS — Z98.890 POST-OPERATIVE STATE: Primary | ICD-10-CM

## 2018-09-24 DIAGNOSIS — T81.31XA DEHISCENCE OF OPERATIVE WOUND, INITIAL ENCOUNTER: ICD-10-CM

## 2018-09-24 DIAGNOSIS — S92.351A DISPLACED FRACTURE OF FIFTH METATARSAL BONE, RIGHT FOOT, INITIAL ENCOUNTER FOR CLOSED FRACTURE: ICD-10-CM

## 2018-09-24 DIAGNOSIS — S62.314G: Primary | ICD-10-CM

## 2018-09-24 PROBLEM — L03.90 CELLULITIS: Status: ACTIVE | Noted: 2018-09-24

## 2018-09-24 PROBLEM — L03.119 CELLULITIS AND ABSCESS OF FOOT: Status: ACTIVE | Noted: 2018-09-24

## 2018-09-24 PROBLEM — L02.619 CELLULITIS AND ABSCESS OF FOOT: Status: ACTIVE | Noted: 2018-09-24

## 2018-09-24 LAB
ANION GAP SERPL CALCULATED.3IONS-SCNC: 15 MMOL/L (ref 9–17)
BUN BLDV-MCNC: 5 MG/DL (ref 6–20)
BUN/CREAT BLD: ABNORMAL (ref 9–20)
C-REACTIVE PROTEIN: 1.3 MG/L (ref 0–5)
CALCIUM SERPL-MCNC: 9.3 MG/DL (ref 8.6–10.4)
CHLORIDE BLD-SCNC: 100 MMOL/L (ref 98–107)
CO2: 23 MMOL/L (ref 20–31)
CREAT SERPL-MCNC: 0.64 MG/DL (ref 0.7–1.2)
GFR AFRICAN AMERICAN: >60 ML/MIN
GFR NON-AFRICAN AMERICAN: >60 ML/MIN
GFR SERPL CREATININE-BSD FRML MDRD: ABNORMAL ML/MIN/{1.73_M2}
GFR SERPL CREATININE-BSD FRML MDRD: ABNORMAL ML/MIN/{1.73_M2}
GLUCOSE BLD-MCNC: 93 MG/DL (ref 70–99)
POTASSIUM SERPL-SCNC: 3.6 MMOL/L (ref 3.7–5.3)
SODIUM BLD-SCNC: 138 MMOL/L (ref 135–144)

## 2018-09-24 PROCEDURE — 94762 N-INVAS EAR/PLS OXIMTRY CONT: CPT

## 2018-09-24 PROCEDURE — 6360000002 HC RX W HCPCS: Performed by: STUDENT IN AN ORGANIZED HEALTH CARE EDUCATION/TRAINING PROGRAM

## 2018-09-24 PROCEDURE — 99212 OFFICE O/P EST SF 10 MIN: CPT | Performed by: STUDENT IN AN ORGANIZED HEALTH CARE EDUCATION/TRAINING PROGRAM

## 2018-09-24 PROCEDURE — 80048 BASIC METABOLIC PNL TOTAL CA: CPT

## 2018-09-24 PROCEDURE — 6370000000 HC RX 637 (ALT 250 FOR IP): Performed by: STUDENT IN AN ORGANIZED HEALTH CARE EDUCATION/TRAINING PROGRAM

## 2018-09-24 PROCEDURE — 86140 C-REACTIVE PROTEIN: CPT

## 2018-09-24 PROCEDURE — 87040 BLOOD CULTURE FOR BACTERIA: CPT

## 2018-09-24 PROCEDURE — 73630 X-RAY EXAM OF FOOT: CPT

## 2018-09-24 PROCEDURE — 2580000003 HC RX 258: Performed by: STUDENT IN AN ORGANIZED HEALTH CARE EDUCATION/TRAINING PROGRAM

## 2018-09-24 PROCEDURE — 85651 RBC SED RATE NONAUTOMATED: CPT

## 2018-09-24 PROCEDURE — 36415 COLL VENOUS BLD VENIPUNCTURE: CPT

## 2018-09-24 PROCEDURE — 99024 POSTOP FOLLOW-UP VISIT: CPT | Performed by: STUDENT IN AN ORGANIZED HEALTH CARE EDUCATION/TRAINING PROGRAM

## 2018-09-24 PROCEDURE — 1200000000 HC SEMI PRIVATE

## 2018-09-24 RX ORDER — HYDROCODONE BITARTRATE AND ACETAMINOPHEN 5; 325 MG/1; MG/1
1 TABLET ORAL EVERY 4 HOURS PRN
Status: DISCONTINUED | OUTPATIENT
Start: 2018-09-24 | End: 2018-09-28 | Stop reason: HOSPADM

## 2018-09-24 RX ORDER — SODIUM CHLORIDE 0.9 % (FLUSH) 0.9 %
10 SYRINGE (ML) INJECTION EVERY 12 HOURS SCHEDULED
Status: DISCONTINUED | OUTPATIENT
Start: 2018-09-24 | End: 2018-09-28 | Stop reason: HOSPADM

## 2018-09-24 RX ORDER — SODIUM HYPOCHLORITE 1.25 MG/ML
SOLUTION TOPICAL DAILY
Status: DISCONTINUED | OUTPATIENT
Start: 2018-09-24 | End: 2018-09-24 | Stop reason: SDUPTHER

## 2018-09-24 RX ORDER — HYDROCODONE BITARTRATE AND ACETAMINOPHEN 5; 325 MG/1; MG/1
2 TABLET ORAL EVERY 4 HOURS PRN
Status: DISCONTINUED | OUTPATIENT
Start: 2018-09-24 | End: 2018-09-28 | Stop reason: HOSPADM

## 2018-09-24 RX ORDER — CEFAZOLIN SODIUM 1 G/50ML
1 INJECTION, SOLUTION INTRAVENOUS EVERY 8 HOURS
Status: DISCONTINUED | OUTPATIENT
Start: 2018-09-24 | End: 2018-09-25

## 2018-09-24 RX ORDER — SODIUM CHLORIDE 0.9 % (FLUSH) 0.9 %
10 SYRINGE (ML) INJECTION PRN
Status: DISCONTINUED | OUTPATIENT
Start: 2018-09-24 | End: 2018-09-28 | Stop reason: HOSPADM

## 2018-09-24 RX ORDER — ONDANSETRON 2 MG/ML
4 INJECTION INTRAMUSCULAR; INTRAVENOUS EVERY 6 HOURS PRN
Status: DISCONTINUED | OUTPATIENT
Start: 2018-09-24 | End: 2018-09-28 | Stop reason: HOSPADM

## 2018-09-24 RX ADMIN — HYDROCODONE BITARTRATE AND ACETAMINOPHEN 2 TABLET: 5; 325 TABLET ORAL at 17:44

## 2018-09-24 RX ADMIN — ENOXAPARIN SODIUM 40 MG: 40 INJECTION SUBCUTANEOUS at 19:12

## 2018-09-24 RX ADMIN — CEFAZOLIN SODIUM 1 G: 1 INJECTION, SOLUTION INTRAVENOUS at 19:11

## 2018-09-24 RX ADMIN — Medication 10 ML: at 17:41

## 2018-09-24 ASSESSMENT — PAIN DESCRIPTION - PAIN TYPE: TYPE: SURGICAL PAIN;ACUTE PAIN

## 2018-09-24 ASSESSMENT — PAIN SCALES - GENERAL
PAINLEVEL_OUTOF10: 7
PAINLEVEL_OUTOF10: 0
PAINLEVEL_OUTOF10: 0

## 2018-09-24 ASSESSMENT — PAIN DESCRIPTION - LOCATION: LOCATION: FOOT

## 2018-09-24 ASSESSMENT — PAIN DESCRIPTION - ORIENTATION: ORIENTATION: RIGHT

## 2018-09-24 NOTE — PROGRESS NOTES
Brooke Young MD at [facility] [address]         Social History:  Social History   Substance Use Topics    Smoking status: Former Smoker     Packs/day: 0.50     Years: 3.00     Types: Cigarettes     Quit date: 1/18/2018    Smokeless tobacco: Never Used    Alcohol use 0.0 oz/week      Comment: social       Medications:  Prior to Admission medications    Medication Sig Start Date End Date Taking? Authorizing Provider   acetaminophen (AMINOFEN) 325 MG tablet Take 2 tablets by mouth every 6 hours as needed for Pain 9/5/18  Yes Yudi Isabel DPM   Misc. Devices (FREE SPIRIT KNEE/LEG WALKER) MISC Please use walker to mobilize until cleared by podiatry to bear weight on foot. 8/10/18  Yes Opal Schmitt MD       Objective     Vitals:    09/24/18 1425   BP: 136/82   Pulse: 92     No results found for: LABA1C    Physical Exam:  General:  Alert and oriented x3. In no acute distress. Lower Extremity Physical Exam:    Vascular: DP and PT pulses are palpable, Bilateral. CFT <3 seconds to all digits, Bilateral.  Global 3+ pitting edema to right foot. Hair growth is present to the level of the digits, Bilateral.     Neuro: Saph/sural/SP/DP/plantar sensation intact to light touch. Musculoskeletal: EHL/FHL/GS/TA gross motor intact. Tenderness to palpation to base iof right 5th metatarsal, and just inferior to the medial malleolus. Gross deformity is absent, Bilateral. POP along surgical incision and peroneal tendon. Dermatologic: Surgical dehiscence noted to the dorsal lateral fifth metatarsal base measuring approximately 2cm x 1.5cx  0.8cm with all sutures present, but not intact. There is mild serous fibrotic drainage with mild malodor present. Wound bed is a mix of fibrogranular tissue. Mild periwound erythema with mild increase in calor. Probes to hardware. No fluctuance, crepitus or induration.       Biomechanical Exam:    Deferred 2/2 acute fracture to 5th

## 2018-09-24 NOTE — H&P
months ago. His smoking use included Cigarettes. He has a 1.50 pack-year smoking history. He has never used smokeless tobacco.   reports that he drinks alcohol. reports that he does not use drugs. Objective     Vitals:  No data found. Average, Min, and Max for last 24 hours Vitals:  TEMPERATURE:  No Data Recorded    RESPIRATIONS RANGE: No Data Recorded    PULSE RANGE: Pulse  Av  Min: 92  Max: 104    BLOOD PRESSURE RANGE:  Systolic (50OLZ), BYY:893 , Min:129 , WSU:187   ; Diastolic (13BSS), BSJ:12, Min:82, Max:88      PULSE OXIMETRY RANGE: No Data Recorded  I&O:  No intake/output data recorded. CBC:  No results for input(s): WBC, HGB, HCT, PLT, CRP in the last 72 hours. Invalid input(s):  ESR     BMP:  No results for input(s): NA, K, CL, CO2, BUN, CREATININE, GLUCOSE, CALCIUM in the last 72 hours. Coags:  No results for input(s): APTT, PROT, INR in the last 72 hours. No results found for: LABA1C  No results found for: SEDRATE   No results found for: CRP     Physical Exam:    General: A&Ox3, NAD  Heart: Regular rate and rhythm. Lungs: Equal air entry. No increased effort. Abdomen: Soft, non-tender to palpation. Not distended. Lower Extremity Physical Exam:  Vascular: DP and PT pulses are palpable, Bilateral. CFT <3 seconds to all digits, Bilateral.  Global 3+ pitting edema to right foot. Hair growth is present to the level of the digits, Bilateral.      Neuro: Saph/sural/SP/DP/plantar sensation intact to light touch.      Musculoskeletal: EHL/FHL/GS/TA gross motor intact. Tenderness to palpation to base of right 5th metatarsal, and just inferior to the medial malleolus. Gross deformity is absent, Bilateral. POP along surgical incision and peroneal tendon.     Dermatologic: Surgical dehiscence noted to the dorsal lateral fifth metatarsal base measuring approximately 2cm x 1.5cx  0.8cm with all sutures present, but not intact.   There is mild serous fibrotic drainage with mild

## 2018-09-25 LAB
ABSOLUTE EOS #: 0.53 K/UL (ref 0–0.44)
ABSOLUTE IMMATURE GRANULOCYTE: 0.1 K/UL (ref 0–0.3)
ABSOLUTE LYMPH #: 2.15 K/UL (ref 1.1–3.7)
ABSOLUTE MONO #: 0.94 K/UL (ref 0.1–1.2)
BASOPHILS # BLD: 1 % (ref 0–2)
BASOPHILS ABSOLUTE: 0.08 K/UL (ref 0–0.2)
DIFFERENTIAL TYPE: ABNORMAL
EOSINOPHILS RELATIVE PERCENT: 4 % (ref 1–4)
HCT VFR BLD CALC: 41.3 % (ref 40.7–50.3)
HEMOGLOBIN: 13.8 G/DL (ref 13–17)
IMMATURE GRANULOCYTES: 1 %
LYMPHOCYTES # BLD: 16 % (ref 24–43)
MCH RBC QN AUTO: 30.3 PG (ref 25.2–33.5)
MCHC RBC AUTO-ENTMCNC: 33.4 G/DL (ref 28.4–34.8)
MCV RBC AUTO: 90.6 FL (ref 82.6–102.9)
MONOCYTES # BLD: 7 % (ref 3–12)
NRBC AUTOMATED: 0 PER 100 WBC
PDW BLD-RTO: 12.5 % (ref 11.8–14.4)
PLATELET # BLD: 331 K/UL (ref 138–453)
PLATELET ESTIMATE: ABNORMAL
PMV BLD AUTO: 10 FL (ref 8.1–13.5)
RBC # BLD: 4.56 M/UL (ref 4.21–5.77)
RBC # BLD: ABNORMAL 10*6/UL
SEDIMENTATION RATE, ERYTHROCYTE: 7 MM (ref 0–10)
SEG NEUTROPHILS: 71 % (ref 36–65)
SEGMENTED NEUTROPHILS ABSOLUTE COUNT: 9.33 K/UL (ref 1.5–8.1)
WBC # BLD: 13.1 K/UL (ref 3.5–11.3)
WBC # BLD: ABNORMAL 10*3/UL

## 2018-09-25 PROCEDURE — 6360000002 HC RX W HCPCS: Performed by: STUDENT IN AN ORGANIZED HEALTH CARE EDUCATION/TRAINING PROGRAM

## 2018-09-25 PROCEDURE — 85025 COMPLETE CBC W/AUTO DIFF WBC: CPT

## 2018-09-25 PROCEDURE — 6370000000 HC RX 637 (ALT 250 FOR IP): Performed by: STUDENT IN AN ORGANIZED HEALTH CARE EDUCATION/TRAINING PROGRAM

## 2018-09-25 PROCEDURE — 93971 EXTREMITY STUDY: CPT

## 2018-09-25 PROCEDURE — G8978 MOBILITY CURRENT STATUS: HCPCS

## 2018-09-25 PROCEDURE — 2580000003 HC RX 258: Performed by: STUDENT IN AN ORGANIZED HEALTH CARE EDUCATION/TRAINING PROGRAM

## 2018-09-25 PROCEDURE — 97161 PT EVAL LOW COMPLEX 20 MIN: CPT

## 2018-09-25 PROCEDURE — 1200000000 HC SEMI PRIVATE

## 2018-09-25 PROCEDURE — 36415 COLL VENOUS BLD VENIPUNCTURE: CPT

## 2018-09-25 PROCEDURE — 94762 N-INVAS EAR/PLS OXIMTRY CONT: CPT

## 2018-09-25 PROCEDURE — G8980 MOBILITY D/C STATUS: HCPCS

## 2018-09-25 PROCEDURE — 99254 IP/OBS CNSLTJ NEW/EST MOD 60: CPT | Performed by: INTERNAL MEDICINE

## 2018-09-25 PROCEDURE — G8979 MOBILITY GOAL STATUS: HCPCS

## 2018-09-25 RX ADMIN — ENOXAPARIN SODIUM 40 MG: 40 INJECTION SUBCUTANEOUS at 11:12

## 2018-09-25 RX ADMIN — HYDROCODONE BITARTRATE AND ACETAMINOPHEN 1 TABLET: 5; 325 TABLET ORAL at 22:22

## 2018-09-25 RX ADMIN — CEFAZOLIN SODIUM 1 G: 1 INJECTION, SOLUTION INTRAVENOUS at 08:08

## 2018-09-25 RX ADMIN — CEFEPIME HYDROCHLORIDE 2 G: 2 INJECTION, POWDER, FOR SOLUTION INTRAVENOUS at 14:18

## 2018-09-25 RX ADMIN — HYDROCODONE BITARTRATE AND ACETAMINOPHEN 2 TABLET: 5; 325 TABLET ORAL at 16:35

## 2018-09-25 RX ADMIN — HYDROCODONE BITARTRATE AND ACETAMINOPHEN 2 TABLET: 5; 325 TABLET ORAL at 07:22

## 2018-09-25 RX ADMIN — Medication 10 ML: at 09:03

## 2018-09-25 RX ADMIN — DAKIN'S SOLUTION 0.125% (QUARTER STRENGTH) 473 ML: 0.12 SOLUTION at 08:11

## 2018-09-25 RX ADMIN — Medication 10 ML: at 22:12

## 2018-09-25 RX ADMIN — CEFAZOLIN SODIUM 1 G: 1 INJECTION, SOLUTION INTRAVENOUS at 03:07

## 2018-09-25 ASSESSMENT — PAIN DESCRIPTION - LOCATION
LOCATION: FOOT

## 2018-09-25 ASSESSMENT — ENCOUNTER SYMPTOMS
ABDOMINAL PAIN: 0
VOMITING: 1
RESPIRATORY NEGATIVE: 1
BACK PAIN: 0
EYES NEGATIVE: 1
NAUSEA: 1
ALLERGIC/IMMUNOLOGIC NEGATIVE: 1
ABDOMINAL DISTENTION: 0

## 2018-09-25 ASSESSMENT — PAIN DESCRIPTION - DESCRIPTORS
DESCRIPTORS: ACHING
DESCRIPTORS: ACHING;DISCOMFORT

## 2018-09-25 ASSESSMENT — PAIN SCALES - GENERAL
PAINLEVEL_OUTOF10: 4
PAINLEVEL_OUTOF10: 2
PAINLEVEL_OUTOF10: 2
PAINLEVEL_OUTOF10: 8
PAINLEVEL_OUTOF10: 8
PAINLEVEL_OUTOF10: 2
PAINLEVEL_OUTOF10: 2
PAINLEVEL_OUTOF10: 8
PAINLEVEL_OUTOF10: 2

## 2018-09-25 ASSESSMENT — PAIN DESCRIPTION - ORIENTATION
ORIENTATION: RIGHT

## 2018-09-25 ASSESSMENT — PAIN DESCRIPTION - FREQUENCY
FREQUENCY: INTERMITTENT
FREQUENCY: INTERMITTENT

## 2018-09-25 ASSESSMENT — PAIN DESCRIPTION - PAIN TYPE
TYPE: SURGICAL PAIN

## 2018-09-25 NOTE — PROGRESS NOTES
activity;Repositioned  Response to Pain Intervention: Patient Satisfied  Vital Signs  Patient Currently in Pain: Yes       Orientation  Orientation  Overall Orientation Status: Within Functional Limits    Social/Functional History  Social/Functional History  Lives With: Alone  Type of Home: House  Home Layout: One level  Home Access: Stairs to enter with rails (pt reports friends have been providing HHA for stair negotiation )  Entrance Stairs - Number of Steps: 3  Entrance Stairs - Rails: Right  Bathroom Shower/Tub: Tub/Shower unit  Bathroom Accessibility: Not accessible  Home Equipment: [employer], [place] Help From: Friend(s)  ADL Assistance: Independent  Homemaking Assistance: Independent  Homemaking Responsibilities: Yes  Ambulation Assistance: Independent  Transfer Assistance: Independent  Active : No  Patient's  Info: friends have been driving   Mode of Transportation: Friends  Occupation: On disability  Type of occupation: [employer]  Additional Comments: Pt reports supportive friends able to assist prn.   Objective          Joint Mobility  Spine: WFL  ROM RLE: hip and knee WFL, ankle not assessed d/t wrapping  ROM LLE: WFL  ROM RUE: WFL  ROM LUE: WFL  Strength RLE  Comment: hip and knee WFL, ankle not assessed  Strength LLE  Strength LLE: WFL  Strength RUE  Strength RUE: WFL  Strength LUE  Strength LUE: WFL  Tone RLE  RLE Tone: Normotonic  Tone LLE  LLE Tone: Normotonic  Sensation  Overall Sensation Status: WFL (pt denies numbness and tingling)  Bed mobility  Supine to Sit: Modified independent  Sit to Supine: Modified independent  Scooting: Independent  Transfers  Sit to Stand: Modified independent  Stand to sit: Modified independent  Bed to Chair: Modified independent  Stand Pivot Transfers: Modified independent  Ambulation  Ambulation?: Yes  Ambulation 1  Surface: level tile  Device: No Device  Assistance: Modified Independent  Quality of Gait: NWB RLE, steady, no LOB  Distance:

## 2018-09-25 NOTE — CONSULTS
and the key elements of all parts of the encounter have been performed by me. I agree with the assessment, plan and orders as documented by the resident.     Italia Taylor, Infectious Diseases

## 2018-09-26 PROCEDURE — 6360000002 HC RX W HCPCS: Performed by: STUDENT IN AN ORGANIZED HEALTH CARE EDUCATION/TRAINING PROGRAM

## 2018-09-26 PROCEDURE — 6370000000 HC RX 637 (ALT 250 FOR IP): Performed by: STUDENT IN AN ORGANIZED HEALTH CARE EDUCATION/TRAINING PROGRAM

## 2018-09-26 PROCEDURE — 2580000003 HC RX 258: Performed by: STUDENT IN AN ORGANIZED HEALTH CARE EDUCATION/TRAINING PROGRAM

## 2018-09-26 PROCEDURE — 99232 SBSQ HOSP IP/OBS MODERATE 35: CPT | Performed by: INTERNAL MEDICINE

## 2018-09-26 PROCEDURE — 1200000000 HC SEMI PRIVATE

## 2018-09-26 RX ORDER — LEVOFLOXACIN 750 MG/1
750 TABLET ORAL DAILY
Status: DISCONTINUED | OUTPATIENT
Start: 2018-09-27 | End: 2018-09-27

## 2018-09-26 RX ADMIN — HYDROCODONE BITARTRATE AND ACETAMINOPHEN 2 TABLET: 5; 325 TABLET ORAL at 16:35

## 2018-09-26 RX ADMIN — DAKIN'S SOLUTION 0.125% (QUARTER STRENGTH): 0.12 SOLUTION at 09:24

## 2018-09-26 RX ADMIN — Medication 10 ML: at 22:11

## 2018-09-26 RX ADMIN — HYDROCODONE BITARTRATE AND ACETAMINOPHEN 2 TABLET: 5; 325 TABLET ORAL at 06:23

## 2018-09-26 RX ADMIN — ENOXAPARIN SODIUM 40 MG: 40 INJECTION SUBCUTANEOUS at 09:21

## 2018-09-26 RX ADMIN — CEFEPIME HYDROCHLORIDE 2 G: 2 INJECTION, POWDER, FOR SOLUTION INTRAVENOUS at 13:39

## 2018-09-26 RX ADMIN — ONDANSETRON 4 MG: 2 INJECTION INTRAMUSCULAR; INTRAVENOUS at 09:42

## 2018-09-26 RX ADMIN — CEFEPIME HYDROCHLORIDE 2 G: 2 INJECTION, POWDER, FOR SOLUTION INTRAVENOUS at 01:54

## 2018-09-26 RX ADMIN — Medication 10 ML: at 09:25

## 2018-09-26 ASSESSMENT — ENCOUNTER SYMPTOMS
CONSTIPATION: 0
VOMITING: 1
ALLERGIC/IMMUNOLOGIC NEGATIVE: 1
ABDOMINAL PAIN: 0
RESPIRATORY NEGATIVE: 1
NAUSEA: 1
DIARRHEA: 0
EYES NEGATIVE: 1
BACK PAIN: 0

## 2018-09-26 ASSESSMENT — PAIN SCALES - GENERAL
PAINLEVEL_OUTOF10: 4
PAINLEVEL_OUTOF10: 3
PAINLEVEL_OUTOF10: 8
PAINLEVEL_OUTOF10: 0
PAINLEVEL_OUTOF10: 10

## 2018-09-26 ASSESSMENT — PAIN DESCRIPTION - ORIENTATION: ORIENTATION: RIGHT

## 2018-09-26 ASSESSMENT — PAIN DESCRIPTION - LOCATION: LOCATION: FOOT

## 2018-09-26 ASSESSMENT — PAIN DESCRIPTION - PAIN TYPE: TYPE: SURGICAL PAIN

## 2018-09-27 PROCEDURE — 6370000000 HC RX 637 (ALT 250 FOR IP): Performed by: STUDENT IN AN ORGANIZED HEALTH CARE EDUCATION/TRAINING PROGRAM

## 2018-09-27 PROCEDURE — 6360000002 HC RX W HCPCS: Performed by: INTERNAL MEDICINE

## 2018-09-27 PROCEDURE — 2580000003 HC RX 258: Performed by: INTERNAL MEDICINE

## 2018-09-27 PROCEDURE — 6360000002 HC RX W HCPCS: Performed by: STUDENT IN AN ORGANIZED HEALTH CARE EDUCATION/TRAINING PROGRAM

## 2018-09-27 PROCEDURE — 97162 PT EVAL MOD COMPLEX 30 MIN: CPT

## 2018-09-27 PROCEDURE — 1200000000 HC SEMI PRIVATE

## 2018-09-27 PROCEDURE — 2580000003 HC RX 258: Performed by: STUDENT IN AN ORGANIZED HEALTH CARE EDUCATION/TRAINING PROGRAM

## 2018-09-27 PROCEDURE — 97530 THERAPEUTIC ACTIVITIES: CPT

## 2018-09-27 PROCEDURE — 99232 SBSQ HOSP IP/OBS MODERATE 35: CPT | Performed by: INTERNAL MEDICINE

## 2018-09-27 PROCEDURE — G8979 MOBILITY GOAL STATUS: HCPCS

## 2018-09-27 PROCEDURE — G8978 MOBILITY CURRENT STATUS: HCPCS

## 2018-09-27 PROCEDURE — 6370000000 HC RX 637 (ALT 250 FOR IP): Performed by: INTERNAL MEDICINE

## 2018-09-27 RX ORDER — DOCUSATE SODIUM 100 MG/1
100 CAPSULE, LIQUID FILLED ORAL DAILY PRN
Status: DISCONTINUED | OUTPATIENT
Start: 2018-09-27 | End: 2018-09-28 | Stop reason: HOSPADM

## 2018-09-27 RX ADMIN — Medication 10 ML: at 09:18

## 2018-09-27 RX ADMIN — LEVOFLOXACIN 750 MG: 750 TABLET, FILM COATED ORAL at 09:17

## 2018-09-27 RX ADMIN — HYDROCODONE BITARTRATE AND ACETAMINOPHEN 2 TABLET: 5; 325 TABLET ORAL at 22:06

## 2018-09-27 RX ADMIN — Medication 10 ML: at 22:07

## 2018-09-27 RX ADMIN — HYDROCODONE BITARTRATE AND ACETAMINOPHEN 2 TABLET: 5; 325 TABLET ORAL at 09:19

## 2018-09-27 RX ADMIN — CEFEPIME HYDROCHLORIDE 2 G: 2 INJECTION, POWDER, FOR SOLUTION INTRAVENOUS at 12:21

## 2018-09-27 RX ADMIN — CEFEPIME HYDROCHLORIDE 2 G: 2 INJECTION, POWDER, FOR SOLUTION INTRAVENOUS at 23:53

## 2018-09-27 RX ADMIN — HYDROCODONE BITARTRATE AND ACETAMINOPHEN 2 TABLET: 5; 325 TABLET ORAL at 13:24

## 2018-09-27 RX ADMIN — DOCUSATE SODIUM 100 MG: 100 CAPSULE, LIQUID FILLED ORAL at 22:14

## 2018-09-27 RX ADMIN — HYDROCODONE BITARTRATE AND ACETAMINOPHEN 2 TABLET: 5; 325 TABLET ORAL at 17:30

## 2018-09-27 RX ADMIN — ENOXAPARIN SODIUM 40 MG: 40 INJECTION SUBCUTANEOUS at 09:17

## 2018-09-27 ASSESSMENT — PAIN SCALES - GENERAL
PAINLEVEL_OUTOF10: 6
PAINLEVEL_OUTOF10: 7
PAINLEVEL_OUTOF10: 7
PAINLEVEL_OUTOF10: 8
PAINLEVEL_OUTOF10: 7
PAINLEVEL_OUTOF10: 5
PAINLEVEL_OUTOF10: 5

## 2018-09-27 ASSESSMENT — ENCOUNTER SYMPTOMS
EYES NEGATIVE: 1
ABDOMINAL PAIN: 0
DIARRHEA: 0
VOMITING: 0
CONSTIPATION: 0
BACK PAIN: 0
NAUSEA: 0
ALLERGIC/IMMUNOLOGIC NEGATIVE: 1
RESPIRATORY NEGATIVE: 1

## 2018-09-27 NOTE — PROGRESS NOTES
Progress Note  Podiatric Medicine and Surgery     Subjective     CC: Surgical dehiscence R foot    Patient seen and examined at bedside  Afebrile, vital signs stable   Pain well controlled. Tolerating PO diet well.  + UO  Patient states he has not had a BM since his admission. HPI :  Ailyn Chadwick is a 34 y.o. male seen at the podiatry speciality clinic for f/u of R ORIF 5th MT (DOS 18). On 18 pt was noted to have excessive bleeding from his surgical site. He was instructed to stop his LVX and a CBC was ordered. Patient missed his follow up appointment on 9/10/18 as he was unable to get a ride. He reports to keeping his dressing c/d/i/ He remained NWB in a CAM boot with a wheelchair. He admits to taking the boot off 20 minutes a day to let his leg air out. He admits to nausea with one episode of vomiting this past week.      PCP is Bernice English MD    ROS: Denies N/V/F/C/SOB/CP. Otherwise negative except at stated in the HPI. Medications:  Scheduled Meds:   levofloxacin  750 mg Oral Daily    sodium chloride flush  10 mL Intravenous 2 times per day    enoxaparin  40 mg Subcutaneous Daily    sodium hypochlorite   Irrigation Daily       Continuous Infusions:    PRN Meds:sodium chloride flush, magnesium hydroxide, ondansetron, HYDROcodone 5 mg - acetaminophen **OR** HYDROcodone 5 mg - acetaminophen    Objective     Vitals:  No data found. Average, Min, and Max for last 24 hours Vitals:  TEMPERATURE:  Temp  Av.3 °F (36.8 °C)  Min: 97.5 °F (36.4 °C)  Max: 98.8 °F (37.1 °C)    RESPIRATIONS RANGE: Resp  Avg: 15.5  Min: 14  Max: 16    PULSE RANGE: Pulse  Av.8  Min: 70  Max: 85    BLOOD PRESSURE RANGE:  Systolic (51AMI), DGU:725 , Min:118 , ZARI:394   ; Diastolic (18CAJ), VUJ:94, Min:76, Max:84      PULSE OXIMETRY RANGE: SpO2  Av.5 %  Min: 96 %  Max: 100 %    I/O last 3 completed shifts: In: 56 [P.O.:600;  I.V.:10]  Out: 2150 [Urine:2150]    CBC:  Recent Labs      18   1740

## 2018-09-27 NOTE — PROGRESS NOTES
order to decrease fall risk        Therapy Time   Individual Concurrent Group Co-treatment   Time In 1520         Time Out 1550         Minutes 15[address] [facility], [address] [facility]

## 2018-09-27 NOTE — PROGRESS NOTES
[facility] - Progress Note  admission date 9/24/2018  Impression :   Current:  · Surgical wound dehiscence with infected exposed hardware, right foot  · Right 5th MTS presumed osteomyelitis on hardware  § S/p right 5th metatarsal ORIF (DOS: 8/29/18)  · Right foot cellulitis  Other:  · Right metacarpal fracture  Discussion / summary of stay / plan of care   · Exposed hardware and presumed OM of the left fifth metatarsal, would plan conservative therapy with antibiotics and consideration of the hardware, and a trial to allow the bone to heal  Recommendations   · Cefepime. Will anticipate 6 weeks IV antibiotics due to exposed hardware  · Wound VAC in place, changed every other day  · Patient amendable to going to SNF due to NWB status    Infection Control Recommendations   · Muse Precautions  Antimicrobial Stewardship Recommendations   · Simplification of therapy  · Targeted therapy  Coordination of Outpatient Care:   · Estimated Length of IV antimicrobials:  · Patient will need Midline / picc Catheter Insertion:   · Patient will need SNF:  · Patient will need outpatient wound care:   Chief complaint/reason for consultation:   Right foot wound dehiscence     History of Present Illness:   Initial history:  Rosanne Hood is a 34y.o.-year-old male who presents with right foot wound dehiscence and cellulitis. Patient was admitted from podiatry clinic on 9/24. Patient underwent a 5th metatarsal ORIF on 8/29/18 with Dr. Bairon Rice. Patient states he noticed bleeding from the surgical wound 9/5/18 and stopped taking Lovenox. Patient missed his follow-up appointment on 9/10 and kept the dressing clean, dry, and intact. He admits to recent nausea with one episode of vomiting last week.  He states he has episodic subjective fever and chills for 1 week.        Interval changes9/27/2018   Patient afebrile  No new labs  No nausea or vomiting since yesterday  No CRP or ESR Regular rhythm and normal heart sounds. Pulmonary/Chest: Effort normal and breath sounds normal. No respiratory distress. He exhibits no tenderness. Abdominal: Soft. Bowel sounds are normal. He exhibits no distension. Genitourinary: No discharge found. Genitourinary Comments: No juarez   Musculoskeletal: He exhibits edema and tenderness. Wound VAC right foot   Neurological: He is alert and oriented to person, place, and time. Skin: Skin is warm. Right lateral foot wound dehiscence. Probes to hardware. Wound has a fibrogranular base. Serosanguinous drainage noted to dressing. No malodor. Wound VAC intact, no drainage in canister. Picture below. Dressing kept intact, findings from 9/25/18   Psychiatric: Mood and affect normal.     9/25/18          Medical Decision Making:   I have independently reviewed/ordered the following labs:    CBC with Differential:   Recent Labs      09/25/18   0552   WBC  13.1*   HGB  13.8   HCT  41.3   PLT  331   LYMPHOPCT  16*   MONOPCT  7     BMP:   Recent Labs      09/24/18   1747   NA  138   K  3.6*   CL  100   CO2  23   BUN  5*   CREATININE  0.64*     Hepatic Function Panel: No results for input(s): PROT, LABALBU, BILIDIR, IBILI, BILITOT, ALKPHOS, ALT, AST in the last 72 hours. No results for input(s): RPR in the last 72 hours. No results for input(s): HIV in the last 72 hours. No results for input(s): BC in the last 72 hours.   Lab Results   Component Value Date    CREATININE 0.64 09/24/2018    GLUCOSE 93 09/24/2018       Detailed results:  ENTEROBACTER CLOACAE     Antibiotic Interpretation SANKET Status   amikacin  NOT REPORTED Final   aztreonam Sensitive <=1 SUSCEPTIBLE Final   ceFAZolin Resistant >=64 RESISTANT Final   cefTRIAXone Sensitive <=1 SUSCEPTIBLE Final   cefepime  NOT REPORTED Final   ciprofloxacin Sensitive <=0.25 SUSCEPTIBLE Final   ertapenem  NOT REPORTED Final   gentamicin Sensitive <=1 SUSCEPTIBLE Final   meropenem  NOT REPORTED Final   nitrofurantoin

## 2018-09-27 NOTE — PROGRESS NOTES
Podiatry assessed the patient and decided patient has to go to SNF for the recovery. Writer passed the message to the new nurse about contacting the physical therapist to reassess the patient's safety regarding the recommendation of going home.

## 2018-09-28 VITALS
HEIGHT: 74 IN | WEIGHT: 250 LBS | BODY MASS INDEX: 32.08 KG/M2 | HEART RATE: 73 BPM | DIASTOLIC BLOOD PRESSURE: 79 MMHG | OXYGEN SATURATION: 100 % | SYSTOLIC BLOOD PRESSURE: 136 MMHG | TEMPERATURE: 97.9 F | RESPIRATION RATE: 18 BRPM

## 2018-09-28 PROCEDURE — 2580000003 HC RX 258: Performed by: STUDENT IN AN ORGANIZED HEALTH CARE EDUCATION/TRAINING PROGRAM

## 2018-09-28 PROCEDURE — 36569 INSJ PICC 5 YR+ W/O IMAGING: CPT

## 2018-09-28 PROCEDURE — 6360000002 HC RX W HCPCS: Performed by: STUDENT IN AN ORGANIZED HEALTH CARE EDUCATION/TRAINING PROGRAM

## 2018-09-28 PROCEDURE — 02HV33Z INSERTION OF INFUSION DEVICE INTO SUPERIOR VENA CAVA, PERCUTANEOUS APPROACH: ICD-10-PCS | Performed by: STUDENT IN AN ORGANIZED HEALTH CARE EDUCATION/TRAINING PROGRAM

## 2018-09-28 PROCEDURE — 76937 US GUIDE VASCULAR ACCESS: CPT

## 2018-09-28 PROCEDURE — 6370000000 HC RX 637 (ALT 250 FOR IP): Performed by: STUDENT IN AN ORGANIZED HEALTH CARE EDUCATION/TRAINING PROGRAM

## 2018-09-28 PROCEDURE — 2580000003 HC RX 258: Performed by: INTERNAL MEDICINE

## 2018-09-28 PROCEDURE — 6360000002 HC RX W HCPCS: Performed by: INTERNAL MEDICINE

## 2018-09-28 PROCEDURE — C1751 CATH, INF, PER/CENT/MIDLINE: HCPCS

## 2018-09-28 RX ORDER — LIDOCAINE HYDROCHLORIDE 10 MG/ML
5 INJECTION, SOLUTION EPIDURAL; INFILTRATION; INTRACAUDAL; PERINEURAL ONCE
Status: DISCONTINUED | OUTPATIENT
Start: 2018-09-28 | End: 2018-09-28 | Stop reason: HOSPADM

## 2018-09-28 RX ORDER — SODIUM CHLORIDE 0.9 % (FLUSH) 0.9 %
10 SYRINGE (ML) INJECTION PRN
Status: DISCONTINUED | OUTPATIENT
Start: 2018-09-28 | End: 2018-09-28 | Stop reason: HOSPADM

## 2018-09-28 RX ORDER — HYDROCODONE BITARTRATE AND ACETAMINOPHEN 5; 325 MG/1; MG/1
1 TABLET ORAL EVERY 4 HOURS PRN
Qty: 28 TABLET | Refills: 0 | Status: SHIPPED | OUTPATIENT
Start: 2018-09-28 | End: 2018-09-28

## 2018-09-28 RX ORDER — HYDROCODONE BITARTRATE AND ACETAMINOPHEN 5; 325 MG/1; MG/1
1 TABLET ORAL EVERY 4 HOURS PRN
Qty: 28 TABLET | Refills: 0 | Status: SHIPPED | OUTPATIENT
Start: 2018-09-28 | End: 2018-10-05

## 2018-09-28 RX ORDER — PSEUDOEPHEDRINE HCL 30 MG
100 TABLET ORAL DAILY PRN
Qty: 12 CAPSULE | Refills: 0 | Status: SHIPPED | OUTPATIENT
Start: 2018-09-28 | End: 2019-01-03

## 2018-09-28 RX ORDER — SODIUM CHLORIDE 0.9 % (FLUSH) 0.9 %
10 SYRINGE (ML) INJECTION EVERY 12 HOURS SCHEDULED
Status: DISCONTINUED | OUTPATIENT
Start: 2018-09-28 | End: 2018-09-28 | Stop reason: HOSPADM

## 2018-09-28 RX ADMIN — DOCUSATE SODIUM 100 MG: 100 CAPSULE, LIQUID FILLED ORAL at 10:35

## 2018-09-28 RX ADMIN — HYDROCODONE BITARTRATE AND ACETAMINOPHEN 2 TABLET: 5; 325 TABLET ORAL at 17:06

## 2018-09-28 RX ADMIN — MAGNESIUM HYDROXIDE 30 ML: 400 SUSPENSION ORAL at 10:35

## 2018-09-28 RX ADMIN — Medication 10 ML: at 10:51

## 2018-09-28 RX ADMIN — ENOXAPARIN SODIUM 40 MG: 40 INJECTION SUBCUTANEOUS at 10:35

## 2018-09-28 RX ADMIN — HYDROCODONE BITARTRATE AND ACETAMINOPHEN 2 TABLET: 5; 325 TABLET ORAL at 13:15

## 2018-09-28 RX ADMIN — Medication 10 ML: at 10:50

## 2018-09-28 RX ADMIN — CEFEPIME HYDROCHLORIDE 2 G: 2 INJECTION, POWDER, FOR SOLUTION INTRAVENOUS at 13:08

## 2018-09-28 ASSESSMENT — PAIN SCALES - GENERAL
PAINLEVEL_OUTOF10: 8
PAINLEVEL_OUTOF10: 0
PAINLEVEL_OUTOF10: 8

## 2018-09-28 ASSESSMENT — ENCOUNTER SYMPTOMS
VOMITING: 0
RESPIRATORY NEGATIVE: 1
DIARRHEA: 0
CONSTIPATION: 0
EYES NEGATIVE: 1
ALLERGIC/IMMUNOLOGIC NEGATIVE: 1
ABDOMINAL PAIN: 0
BACK PAIN: 0
NAUSEA: 0

## 2018-09-28 NOTE — DISCHARGE SUMMARY
Discharge Summary  Podiatric Medicine and Surgery    Patient Identification     Reji Simons is a 34 y.o. male  :  1990  MRN: [id number]  Acct: [de-identified]     Admit date: 2018  Discharge date and time: 2018  6:00 PM     Admitting Physician: Michelle Bui DPM   Discharge Physician: Benny Elliott     Admission Diagnoses: Cellulitis [L03.90]  Cellulitis and abscess of foot [L03.119, L02.619]  Discharge Diagnoses:   Patient Active Problem List   Diagnosis    Closed displaced fracture of base of fourth metacarpal bone of right hand    Cellulitis    Cellulitis and abscess of foot    Acute osteomyelitis of left foot [facility])       Admission Condition: fair  Discharged Condition: fair    Hospital Course     Brief Inpatient Course: Admitted on 2018  3:56 PM for Cellulitis [L03.90]  Cellulitis and abscess of foot [L03.119, L02.619]. Patient was direct admit from podiatry specialty clinic after he was noted to have a surgical wound dehiscence with exposed hardware from previous R ORIF 5th MT (DOS 18). ID consulted for abx recommendations. Wound vac therapy initiated and improvoement in wound was noted. Pt discharged with 6 weeks of IV cefepime with picc line. Due to his inability to be NWB with crutches 2/2 CRPP R small and ring finger in July. Therefore he required discharge to SNF. Consults: ID    Patient Instructions     Medications:      Medication List      START taking these medications    cefepime infusion  Commonly known as:  MAXIPIME  Infuse 2,000 mg intravenously every 12 hours Compound per protocol     docusate 100 MG Caps  Commonly known as:  COLACE, DULCOLAX  Take 100 mg by mouth daily as needed for Constipation     enoxaparin 40 MG/0.4ML injection  Commonly known as:  LOVENOX  Inject 0.4 mLs into the skin daily for 14 days     HYDROcodone-acetaminophen 5-325 MG per tablet  Commonly known as:  NORCO  Take 1 tablet by mouth every 4 hours as needed for Pain for up to 7 days. Mir Ingram CONTINUE taking these medications    acetaminophen 325 MG tablet  Commonly known as:  AMINOFEN  Take 2 tablets by mouth every 6 hours as needed for Pain     FREE SPIRIT [address] [facility]  [address] [place]  Please use walker to mobilize until cleared by podiatry to bear weight on foot. Where to Get Your Medications      These medications were sent to [facility] [address]  [address] [facility], [facility] [address]    Phone:  [phone]   · docusate 100 MG Caps  · enoxaparin 40 MG/0.4ML injection     You can get these medications from any pharmacy    Bring a paper prescription for each of these medications  · cefepime infusion  · HYDROcodone-acetaminophen 5-325 MG per tablet         Activity:  NWB to R foot    Diet: The patient is asked to make an attempt to improve diet and exercise patterns to aid in medical management of this problem. Disposition: SNF    Wound Care: R foot wound vac changes MWF at 125mmHg low continuous pressure with black granufoam.     Follow-up: No Follow-up on file.   [facility] podiatry clinic 1 week    AVI Bustos [facility]  Podiatric Medicine & Surgery

## 2018-09-28 NOTE — PROGRESS NOTES
[facility] - Progress Note  admission date 9/24/2018  Impression :   Current:  · Surgical wound dehiscence with infected exposed hardware, right foot  · Right 5th MTS presumed osteomyelitis on hardware  § S/p right 5th metatarsal ORIF (DOS: 8/29/18)  · Right foot cellulitis  Other:  · Right metacarpal fracture  Discussion / summary of stay / plan of care   · Exposed hardware and presumed OM of the left fifth metatarsal, would plan conservative therapy with antibiotics and consideration of the hardware, and a trial to allow the bone to heal  Recommendations   · Cefepime. Will anticipate 6 weeks IV antibiotics due to exposed hardware  · Ordered PICC for IV abx  · Wound VAC in place, change later today  · Patient amendable to going to SNF due to NWB status  ·     Infection Control Recommendations   · [place] Precautions  Antimicrobial Stewardship Recommendations   · Simplification of therapy  · Targeted therapy  Coordination of Outpatient Care:   · Estimated Length of IV antimicrobials:  · Patient will need Midline / picc Catheter Insertion:   · Patient will need SNF:  · Patient will need outpatient wound care:   Chief complaint/reason for consultation:   Right foot wound dehiscence     History of Present Illness:   Initial history:  Greg Cox is a 34y.o.-year-old male who presents with right foot wound dehiscence and cellulitis. Patient was admitted from podiatry clinic on 9/24. Patient underwent a 5th metatarsal ORIF on 8/29/18 with Dr. Mina Aviles. Patient states he noticed bleeding from the surgical wound 9/5/18 and stopped taking Lovenox. Patient missed his follow-up appointment on 9/10 and kept the dressing clean, dry, and intact. He admits to recent nausea with one episode of vomiting last week.  He states he has episodic subjective fever and chills for 1 week.        Interval changes9/28/2018   Patient afebrile  No new labs  No new nausea or vomiting  No CRP or ESR tracheal deviation present. No thyromegaly present. Cardiovascular: Regular rhythm and normal heart sounds. Pulmonary/Chest: Effort normal and breath sounds normal. No respiratory distress. He exhibits no tenderness. Abdominal: Soft. Bowel sounds are normal. He exhibits no distension. Genitourinary: No discharge found. Genitourinary Comments: No juarez   Musculoskeletal: He exhibits edema and tenderness. He exhibits no deformity. Wound VAC right foot. Picture below from 9/25   Neurological: He is alert and oriented to person, place, and time. Skin: Skin is warm. Right lateral foot wound dehiscence. Probes to hardware. Wound has a fibrogranular base. Serosanguinous drainage noted to dressing. No malodor. Wound VAC intact, no drainage in canister. Picture below. Dressing kept intact, findings from 9/25/18   Psychiatric: Mood and affect normal.     9/25/18          Medical Decision Making:   I have independently reviewed/ordered the following labs:    CBC with Differential:   No results for input(s): WBC, HGB, HCT, PLT, SEGSPCT, BANDSPCT, LYMPHOPCT, MONOPCT, EOSPCT in the last 72 hours. BMP:   No results for input(s): NA, K, CL, CO2, BUN, CREATININE, MG in the last 72 hours. Invalid input(s): CA  Hepatic Function Panel: No results for input(s): PROT, LABALBU, BILIDIR, IBILI, BILITOT, ALKPHOS, ALT, AST in the last 72 hours. No results for input(s): RPR in the last 72 hours. No results for input(s): HIV in the last 72 hours. No results for input(s): BC in the last 72 hours.   Lab Results   Component Value Date    CREATININE 0.64 09/24/2018    GLUCOSE 93 09/24/2018       Detailed results:  ENTEROBACTER CLOACAE     Antibiotic Interpretation SANKET Status   amikacin  NOT REPORTED Final   aztreonam Sensitive <=1 SUSCEPTIBLE Final   ceFAZolin Resistant >=64 RESISTANT Final   cefTRIAXone Sensitive <=1 SUSCEPTIBLE Final   cefepime  NOT REPORTED Final   ciprofloxacin Sensitive <=0.25 SUSCEPTIBLE Final

## 2018-09-29 LAB
CULTURE: ABNORMAL
DIRECT EXAM: ABNORMAL
DIRECT EXAM: ABNORMAL
Lab: ABNORMAL
ORGANISM: ABNORMAL
ORGANISM: ABNORMAL
SPECIMEN DESCRIPTION: ABNORMAL
STATUS: ABNORMAL

## 2018-09-30 LAB
CULTURE: NORMAL
CULTURE: NORMAL
Lab: NORMAL
Lab: NORMAL
SPECIMEN DESCRIPTION: NORMAL
SPECIMEN DESCRIPTION: NORMAL
STATUS: NORMAL
STATUS: NORMAL

## 2018-10-10 ENCOUNTER — OFFICE VISIT (OUTPATIENT)
Dept: PODIATRY | Age: 28
End: 2018-10-10
Payer: COMMERCIAL

## 2018-10-10 VITALS
SYSTOLIC BLOOD PRESSURE: 112 MMHG | HEART RATE: 93 BPM | DIASTOLIC BLOOD PRESSURE: 74 MMHG | BODY MASS INDEX: 32.1 KG/M2 | HEIGHT: 74 IN

## 2018-10-10 DIAGNOSIS — Z98.890 POST-OPERATIVE STATE: Primary | ICD-10-CM

## 2018-10-10 DIAGNOSIS — Z98.890 S/P FOOT SURGERY, RIGHT: ICD-10-CM

## 2018-10-10 DIAGNOSIS — R60.0 EDEMA OF LOWER EXTREMITY: ICD-10-CM

## 2018-10-10 DIAGNOSIS — T81.31XA DEHISCENCE OF OPERATIVE WOUND, INITIAL ENCOUNTER: ICD-10-CM

## 2018-10-10 PROCEDURE — G8417 CALC BMI ABV UP PARAM F/U: HCPCS | Performed by: STUDENT IN AN ORGANIZED HEALTH CARE EDUCATION/TRAINING PROGRAM

## 2018-10-10 PROCEDURE — G8427 DOCREV CUR MEDS BY ELIG CLIN: HCPCS | Performed by: STUDENT IN AN ORGANIZED HEALTH CARE EDUCATION/TRAINING PROGRAM

## 2018-10-10 PROCEDURE — 1036F TOBACCO NON-USER: CPT | Performed by: STUDENT IN AN ORGANIZED HEALTH CARE EDUCATION/TRAINING PROGRAM

## 2018-10-10 PROCEDURE — G8484 FLU IMMUNIZE NO ADMIN: HCPCS | Performed by: STUDENT IN AN ORGANIZED HEALTH CARE EDUCATION/TRAINING PROGRAM

## 2018-10-10 PROCEDURE — 1111F DSCHRG MED/CURRENT MED MERGE: CPT | Performed by: STUDENT IN AN ORGANIZED HEALTH CARE EDUCATION/TRAINING PROGRAM

## 2018-10-10 PROCEDURE — 99024 POSTOP FOLLOW-UP VISIT: CPT | Performed by: STUDENT IN AN ORGANIZED HEALTH CARE EDUCATION/TRAINING PROGRAM

## 2018-10-10 PROCEDURE — 11042 DBRDMT SUBQ TIS 1ST 20SQCM/<: CPT | Performed by: STUDENT IN AN ORGANIZED HEALTH CARE EDUCATION/TRAINING PROGRAM

## 2018-10-10 PROCEDURE — 99212 OFFICE O/P EST SF 10 MIN: CPT | Performed by: STUDENT IN AN ORGANIZED HEALTH CARE EDUCATION/TRAINING PROGRAM

## 2018-10-17 ENCOUNTER — OFFICE VISIT (OUTPATIENT)
Dept: PODIATRY | Age: 28
End: 2018-10-17
Payer: COMMERCIAL

## 2018-10-17 VITALS
HEART RATE: 93 BPM | WEIGHT: 250 LBS | BODY MASS INDEX: 32.08 KG/M2 | DIASTOLIC BLOOD PRESSURE: 78 MMHG | HEIGHT: 74 IN | SYSTOLIC BLOOD PRESSURE: 124 MMHG

## 2018-10-17 DIAGNOSIS — T81.31XD DEHISCENCE OF OPERATIVE WOUND, SUBSEQUENT ENCOUNTER: ICD-10-CM

## 2018-10-17 DIAGNOSIS — Z98.890 S/P FOOT SURGERY, RIGHT: ICD-10-CM

## 2018-10-17 DIAGNOSIS — Z98.890 POST-OPERATIVE STATE: Primary | ICD-10-CM

## 2018-10-17 PROCEDURE — 97597 DBRDMT OPN WND 1ST 20 CM/<: CPT | Performed by: STUDENT IN AN ORGANIZED HEALTH CARE EDUCATION/TRAINING PROGRAM

## 2018-10-17 PROCEDURE — 99024 POSTOP FOLLOW-UP VISIT: CPT | Performed by: STUDENT IN AN ORGANIZED HEALTH CARE EDUCATION/TRAINING PROGRAM

## 2018-10-17 PROCEDURE — 11042 DBRDMT SUBQ TIS 1ST 20SQCM/<: CPT | Performed by: STUDENT IN AN ORGANIZED HEALTH CARE EDUCATION/TRAINING PROGRAM

## 2018-10-17 NOTE — PROGRESS NOTES
Patient instructed to remove shoes and socks, instructed to sit in exam chair. Current PCP name is Dr. Luiza Wu and date of last visit unknown. Do you have a follow up visit scheduled?   no
absent, Bilateral. POP along surgical incision and peroneal tendon. Dermatologic: Surgical dehiscence noted to the dorsal lateral fifth metatarsal base measuring approximately 1.1cm x 0.5 cm 0.2cm (previously 1.5cm x 0.9cx  0.5cm). Wound bed is red and granular. No exposed hardware seen, but probes to hardware underneath granular tissue. There is mild serous fibrotic drainage with no mild malodor present. Mild periwound maceration. Biomechanical Exam:    Deferred 2/2 acute fracture to 5th metatarsal right foot. Ariadna Monroy is a 34 y.o. male with     Diagnosis Orders   1. Post-operative state     2. S/P foot surgery, right     3. Dehiscence of operative wound, subsequent encounter          Plan   · Patient examined and evaluated with  [facility]  · Diagnosis and treatment options discussed in detail  · Sharp debridement of dehisced surgical site to right foot with dermal curette down to level of subcutaneous tissue. · Instructed to bring correct disk at next appointment. If not, will get new xrays next week  · Applied DSD to right foot  · Pt to continue with wound vac therapy  · Apply black granulofoam (no adaptic); vac settinmmgHg continuous, change q3  · Instructed to continue to wear CAM boot /; Pt to remain NWB to RLE  · Continue with IV abx until end date  · RTC 1 week  · Discussed case with Dr. Kamila Coe    No orders of the defined types were placed in this encounter. No orders of the defined types were placed in this encounter.     Electronically signed by Renae Bustillo DPM on 10/17/2018 at 3:23 PM
no

## 2018-10-24 ENCOUNTER — OFFICE VISIT (OUTPATIENT)
Dept: PODIATRY | Age: 28
End: 2018-10-24
Payer: COMMERCIAL

## 2018-10-24 ENCOUNTER — HOSPITAL ENCOUNTER (OUTPATIENT)
Age: 28
Discharge: HOME | End: 2018-10-26
Payer: COMMERCIAL

## 2018-10-24 ENCOUNTER — HOSPITAL ENCOUNTER (OUTPATIENT)
Dept: GENERAL RADIOLOGY | Age: 28
Discharge: HOME | End: 2018-10-26
Payer: COMMERCIAL

## 2018-10-24 VITALS
HEART RATE: 88 BPM | BODY MASS INDEX: 34.65 KG/M2 | HEIGHT: 74 IN | WEIGHT: 270 LBS | SYSTOLIC BLOOD PRESSURE: 136 MMHG | DIASTOLIC BLOOD PRESSURE: 82 MMHG

## 2018-10-24 DIAGNOSIS — S92.351A DISPLACED FRACTURE OF FIFTH METATARSAL BONE, RIGHT FOOT, INITIAL ENCOUNTER FOR CLOSED FRACTURE: ICD-10-CM

## 2018-10-24 DIAGNOSIS — Z98.890 S/P FOOT SURGERY, RIGHT: ICD-10-CM

## 2018-10-24 DIAGNOSIS — M79.671 PAIN OF RIGHT FOOT: ICD-10-CM

## 2018-10-24 DIAGNOSIS — M85.871 OSTEOPENIA OF RIGHT FOOT: ICD-10-CM

## 2018-10-24 DIAGNOSIS — Z98.890 POST-OPERATIVE STATE: Primary | ICD-10-CM

## 2018-10-24 DIAGNOSIS — R60.0 EDEMA OF LOWER EXTREMITY: ICD-10-CM

## 2018-10-24 DIAGNOSIS — Z98.890 POST-OPERATIVE STATE: ICD-10-CM

## 2018-10-24 DIAGNOSIS — T81.31XD DEHISCENCE OF OPERATIVE WOUND, SUBSEQUENT ENCOUNTER: ICD-10-CM

## 2018-10-24 PROCEDURE — 99024 POSTOP FOLLOW-UP VISIT: CPT | Performed by: STUDENT IN AN ORGANIZED HEALTH CARE EDUCATION/TRAINING PROGRAM

## 2018-10-24 PROCEDURE — 73630 X-RAY EXAM OF FOOT: CPT

## 2018-10-24 PROCEDURE — 11042 DBRDMT SUBQ TIS 1ST 20SQCM/<: CPT | Performed by: STUDENT IN AN ORGANIZED HEALTH CARE EDUCATION/TRAINING PROGRAM

## 2018-10-24 NOTE — PROGRESS NOTES
[facility]  [address],  [place]  Tel: [phone]   Fax: [phone]    Subjective     CC: S/p ORIF right 5th metatarsal fracture    HPI:  Franklyn Lewis is a 34y.o. year old male who presents to clinic today for follow up to ORIF right 5th metatarsal. (DOS 8/29/18, Dr. Gabriela Quezada). Patient was recently admitted to the hospital from 9/24/18-9/27/18) for suspected exposed hardware and suspected osteomyelitis. Pt was discharged to a SNF with IV abx and wound vac therapy to the right foot. Patient states he has gotten his xray of right foot, and has brought the disk with him today. He has remained NWB in a CAM boot. Continues to have 6/10 pain to right foot. Denies other complaints. Denies N/F/V/C. Primary care physician is Steven Shannon MD.    ROS:    Constitutional: Reports occasional fevers and chills none currently. Denies nausea/vomiting. Neurologic: Denies numbness, tingling, and burning in the feet. Vascular: Denies symptoms of lower extremity claudication. Skin: Denies open wounds. Otherwise negative except as noted in the HPI.      PMH:  Past Medical History:   Diagnosis Date    Acute osteomyelitis of left foot ([address]     Cellulitis 9/24/2018    Cellulitis and abscess of foot 9/24/2018    Closed displaced fracture of base of fourth metacarpal bone of right hand 7/23/2018    Foot fracture, right 08/2018    GERD (gastroesophageal reflux disease)     no longer on RX    Hand fracture, right 08/2018       Surgical History:   Past Surgical History:   Procedure Laterality Date    FINGER CLOSED REDUCTION  07/25/2018    CLOSED REDUCTION PERC PINNING SMALL AND FINGER FINGER METACARPAL BASE -    OPEN TREATMENT METATARSAL FRACTURE EACH Right 8/29/2018    OPEN REDUCTION INTERNAL FIXATION 5TH METATARSAL, MEDIAL MALLEOLUS ORIF  (ODETTE Mercado - JOYCE ALFARO) performed by Yves Kuo DPM at [address]  08/29/2018     OPEN REDUCTION

## 2018-10-30 ENCOUNTER — HOSPITAL ENCOUNTER (OUTPATIENT)
Dept: CT IMAGING | Age: 28
Discharge: HOME | End: 2018-11-01
Payer: COMMERCIAL

## 2018-10-30 DIAGNOSIS — S92.351A DISPLACED FRACTURE OF FIFTH METATARSAL BONE, RIGHT FOOT, INITIAL ENCOUNTER FOR CLOSED FRACTURE: ICD-10-CM

## 2018-10-30 PROCEDURE — 73700 CT LOWER EXTREMITY W/O DYE: CPT

## 2018-10-31 ENCOUNTER — OFFICE VISIT (OUTPATIENT)
Dept: INFECTIOUS DISEASES | Age: 28
End: 2018-10-31
Payer: COMMERCIAL

## 2018-10-31 ENCOUNTER — HOSPITAL ENCOUNTER (OUTPATIENT)
Dept: PAIN MANAGEMENT | Age: 28
Discharge: HOME | End: 2018-10-31
Payer: COMMERCIAL

## 2018-10-31 VITALS
HEIGHT: 74 IN | TEMPERATURE: 98.7 F | WEIGHT: 270 LBS | SYSTOLIC BLOOD PRESSURE: 121 MMHG | BODY MASS INDEX: 34.65 KG/M2 | DIASTOLIC BLOOD PRESSURE: 82 MMHG | HEART RATE: 83 BPM | RESPIRATION RATE: 20 BRPM

## 2018-10-31 VITALS
BODY MASS INDEX: 34.65 KG/M2 | WEIGHT: 270 LBS | HEIGHT: 74 IN | TEMPERATURE: 98.9 F | HEART RATE: 83 BPM | SYSTOLIC BLOOD PRESSURE: 131 MMHG | DIASTOLIC BLOOD PRESSURE: 80 MMHG

## 2018-10-31 DIAGNOSIS — M79.671 CHRONIC FOOT PAIN, RIGHT: Primary | ICD-10-CM

## 2018-10-31 DIAGNOSIS — M86.071 ACUTE HEMATOGENOUS OSTEOMYELITIS OF RIGHT FOOT (HCC): Primary | ICD-10-CM

## 2018-10-31 DIAGNOSIS — S92.352G CLOSED DISPLACED FRACTURE OF FIFTH METATARSAL BONE OF LEFT FOOT WITH DELAYED HEALING, SUBSEQUENT ENCOUNTER: ICD-10-CM

## 2018-10-31 DIAGNOSIS — G89.29 CHRONIC FOOT PAIN, RIGHT: Primary | ICD-10-CM

## 2018-10-31 DIAGNOSIS — G90.521 COMPLEX REGIONAL PAIN SYNDROME TYPE 1 OF RIGHT LOWER EXTREMITY: ICD-10-CM

## 2018-10-31 PROCEDURE — 99244 OFF/OP CNSLTJ NEW/EST MOD 40: CPT | Performed by: ANESTHESIOLOGY

## 2018-10-31 PROCEDURE — G8427 DOCREV CUR MEDS BY ELIG CLIN: HCPCS | Performed by: INTERNAL MEDICINE

## 2018-10-31 PROCEDURE — 99214 OFFICE O/P EST MOD 30 MIN: CPT | Performed by: INTERNAL MEDICINE

## 2018-10-31 PROCEDURE — G8484 FLU IMMUNIZE NO ADMIN: HCPCS | Performed by: INTERNAL MEDICINE

## 2018-10-31 PROCEDURE — G8417 CALC BMI ABV UP PARAM F/U: HCPCS | Performed by: INTERNAL MEDICINE

## 2018-10-31 PROCEDURE — 1036F TOBACCO NON-USER: CPT | Performed by: INTERNAL MEDICINE

## 2018-10-31 PROCEDURE — 99203 OFFICE O/P NEW LOW 30 MIN: CPT

## 2018-10-31 RX ORDER — LEVOFLOXACIN 500 MG/1
500 TABLET, FILM COATED ORAL DAILY
Qty: 30 TABLET | Refills: 2 | Status: SHIPPED | OUTPATIENT
Start: 2018-10-31 | End: 2018-11-30

## 2018-10-31 RX ORDER — PREGABALIN 50 MG/1
50 CAPSULE ORAL 3 TIMES DAILY
COMMUNITY
End: 2018-10-31 | Stop reason: SDUPTHER

## 2018-10-31 RX ORDER — PREGABALIN 50 MG/1
50 CAPSULE ORAL 3 TIMES DAILY
Qty: 42 CAPSULE | Refills: 0 | Status: SHIPPED | OUTPATIENT
Start: 2018-10-31 | End: 2018-11-14 | Stop reason: DRUGHIGH

## 2018-10-31 RX ORDER — HYDROCODONE BITARTRATE AND ACETAMINOPHEN 5; 325 MG/1; MG/1
1 TABLET ORAL EVERY 6 HOURS PRN
COMMUNITY
End: 2019-01-03

## 2018-10-31 RX ORDER — CYCLOBENZAPRINE HCL 10 MG
10 TABLET ORAL 3 TIMES DAILY PRN
COMMUNITY
End: 2018-12-11 | Stop reason: ALTCHOICE

## 2018-10-31 ASSESSMENT — PAIN DESCRIPTION - FREQUENCY: FREQUENCY: CONTINUOUS

## 2018-10-31 ASSESSMENT — PAIN DESCRIPTION - ORIENTATION: ORIENTATION: RIGHT

## 2018-10-31 ASSESSMENT — PAIN DESCRIPTION - DIRECTION: RADIATING_TOWARDS: RT FOOT

## 2018-10-31 ASSESSMENT — PAIN DESCRIPTION - PROGRESSION: CLINICAL_PROGRESSION: GRADUALLY WORSENING

## 2018-10-31 ASSESSMENT — ENCOUNTER SYMPTOMS: ABDOMINAL PAIN: 0

## 2018-10-31 ASSESSMENT — PAIN DESCRIPTION - PAIN TYPE: TYPE: CHRONIC PAIN

## 2018-10-31 ASSESSMENT — PAIN DESCRIPTION - ONSET: ONSET: PROGRESSIVE

## 2018-10-31 ASSESSMENT — PAIN SCALES - GENERAL: PAINLEVEL_OUTOF10: 8

## 2018-10-31 ASSESSMENT — PAIN DESCRIPTION - LOCATION: LOCATION: FOOT

## 2018-10-31 NOTE — PATIENT INSTRUCTIONS
pls scrub off the fibracol every days before replacing it on the wound-   Wet the new patch w a drop of saline before covering it w saline  Repeat daily till wound closes 100%.   Iv antib to be done 11/6 then pull the line and start the levaquin 500 mg po daily x 3 months  See me  In 1 month

## 2018-10-31 NOTE — PROGRESS NOTES
by Norbert Lr DPM at [address]  08/29/2018     OPEN REDUCTION INTERNAL FIXATION 5TH METATARSAL, MEDIAL MALLEOLUS ORIF  (PARAGON Jess - JOYCE ALFARO) (Right Foot)    OTHER SURGICAL HISTORY Right 08/29/2018    ORIF 5th metatarsal    RI OFFICE/OUTPT VISIT,PROCEDURE ONLY Right 7/25/2018    CLOSED REDUCTION PERC PINNING SMALL AND FINGER FINGER METACARPAL BASE - C ARM, SYNTHES performed by Wojciech Chavira MD at [place] [address] EXTRACTION         Medications  Current Outpatient Prescriptions   Medication Sig Dispense Refill    HYDROcodone-acetaminophen (NORCO) 5-325 MG per tablet Take 1 tablet by mouth every 6 hours as needed for Pain. Josué Charito cyclobenzaprine (FLEXERIL) 10 MG tablet Take 10 mg by mouth 3 times daily as needed for Muscle spasms      docusate sodium (COLACE, DULCOLAX) 100 MG CAPS Take 100 mg by mouth daily as needed for Constipation 12 capsule 0    cefepime (MAXIPIME) infusion Infuse 2,000 mg intravenously every 12 hours Compound per protocol 120 g 1    acetaminophen (AMINOFEN) 325 MG tablet Take 2 tablets by mouth every 6 hours as needed for Pain 120 tablet 3    Misc. Devices (FREE SPIRIT KNEE/LEG WALKER) MISC Please use walker to mobilize until cleared by podiatry to bear weight on foot. 1 each 0     No current facility-administered medications for this encounter. Allergies  Patient has no known allergies. Family History  family history includes Diabetes in his mother; Hypertension in his father; Mental Illness in his father.       Social History  Social History     Social History    Marital status: Single     Spouse name: N/A    Number of children: N/A    Years of education: N/A     Social History Main Topics    Smoking status: Former Smoker     Packs/day: 0.50     Years: 3.00     Types: Cigarettes     Quit date: 1/18/2018    Smokeless tobacco: Never Used    Alcohol use 0.0 oz/week      Comment: social    today referred here for evaluation of chronic right foot pain. Pain is located over the right foot. It is started on originally at the site of his injury but after the surgery and infection and it hasn't spread more generalized into the foot. He describes this as constant burning tingling sensation. He reported numbness in his left foot. No extension of his pain above the ankle level. He has noticed alert and temperature changes in his foot. His right foot appears swollen. It is tender to touch. He feels abnormal sensation if he tries to cover it with a clothing or a sock. Patient had a recent CT scan of right foot. Report is available in Epic. Her average pain intensity is 8 out of 10. Pain is constant. Aggravates with weather changes and any movement. He is unable to do any weightbearing on his foot. And of been doing therapy at nursing home  He has not been tried on neuropathic pain medication Topamax, Cymbalta or Neurontin or Lyrica at this time. EXAMINATION: CT OF THE RIGHT FOOT WITHOUT CONTRAST 10/30/2018 5:35 pm    Postsurgical changes of the 5th metatarsal.  No evidence for hardware complication. Diffuse osteopenia with periarticular predominance. Differential considerations include complex regional pain syndrome and/or disuse osteopenia. Subcutaneous soft tissue edema along the dorsum of the foot without drainable fluid collection. 1. Chronic foot pain, right    2. Closed displaced fracture of fifth metatarsal bone of left foot with delayed healing, subsequent encounter    3. Complex regional pain syndrome type 1 of right lower extremity      - History clinical presentation and physical examination on along with CT finding of right foot suggest development of right foot CRPS.     Typically lumbar sympathetic block series at been helpful manage symptoms to facilitate aggressive physical rehab but unfortunately patient is not a candidate for any interventional procedure at this

## 2018-10-31 NOTE — PROGRESS NOTES
[facility] - Initial Consult Note  Today's Date: 10/31/2018    Impression :     · Right fifth MTS presumed osteomyelitis on hardware  · Surgical wound dehiscence and exposed hardware infection  · Post fifth MTS fx and ORIF 8/29/18  · Right foot cellulitis    Recommendations   · Finishing in 1 week cefepime 4 weeks. After which we will give the patient Levaquin 500 by mouth for 3 months. · We have discussed with that with Dr. Alonso Hernandez, which stopped antibiotic once the bone has bridged enough to allow stability. ·    Diagnosis Orders   1. Acute hematogenous osteomyelitis of right foot (HCC)  levofloxacin (LEVAQUIN) 500 MG tablet       Return in about 4 weeks (around 11/28/2018). History of Present Illness:   Nicolas Bee is a 34y.o.-year-old  male who presents with   Chief Complaint   Patient presents with    Frequent Infections     [address]   19-year-old gentleman seen at [facility] September 26/18, has had a fractured fifth metatarsal with ORIF.  8/29/18, end up with open dehisced wound and infected exposed hardware with presumed osteomyelitis, associated with a right foot cellulitis. Wound deep cultures grew enterococcus and Enterobacter cloaca. The patient was discharged on cefepime IV planned to be for 6 weeks as finished for now about 5 weeks. At this time, the wound is much better and the hardware is not exposed, healing has improved. And he was allowed to do more with his foot at this time. No fevr or chills. No diarrhea. I have personally reviewed the past medical history, past surgical history, medications, social history, and family history, and I haveupdated the database accordingly.   Past Medical History:     Past Medical History:   Diagnosis Date    Acute osteomyelitis of left foot ([address]     Cellulitis 9/24/2018    Cellulitis and abscess of foot 9/24/2018    Closed displaced fracture of base of fourth metacarpal bone of right hand every 6 hours as needed for Pain, Disp: 120 tablet, Rfl: 3    Misc. Devices (FREE SPIRIT KNEE/LEG WALKER) MISC, Please use walker to mobilize until cleared by podiatry to bear weight on foot., Disp: 1 each, Rfl: 0      Social History:     Social History     Social History    Marital status: Single     Spouse name: N/A    Number of children: N/A    Years of education: N/A     Occupational History    Not on file. Social History Main Topics    Smoking status: Former Smoker     Packs/day: 0.50     Years: 3.00     Types: Cigarettes     Quit date: 1/18/2018    Smokeless tobacco: Never Used    Alcohol use 0.0 oz/week      Comment: social    Drug use: No    Sexual activity: Yes     Partners: Female     Other Topics Concern    Not on file     Social History Narrative    No narrative on file       Family History:     Family History   Problem Relation Age of Onset    Diabetes Mother     Hypertension Father     Mental Illness Father     Other Neg Hx         blood clots        Allergies:   Patient has no known allergies. Review of Systems:   Review of Systems   Constitutional: Negative. HENT: Negative. Eyes: Negative. Respiratory: Negative. Cardiovascular: Negative. Gastrointestinal: Negative. Endocrine: Negative. Genitourinary: Negative. Musculoskeletal: Positive for arthralgias. Skin: Negative. Allergic/Immunologic: Negative. Neurological: Negative. Hematological: Negative. Psychiatric/Behavioral: Negative. Physical Examination :   Blood pressure 131/80, pulse 83, temperature 98.9 °F (37.2 °C), height 6' 2\" (1.88 m), weight 270 lb (122.5 kg). Physical Exam   Constitutional: He is oriented to person, place, and time. He appears well-developed and well-nourished. HENT:   Head: Normocephalic and atraumatic. Eyes: Conjunctivae are normal. No scleral icterus. Cardiovascular: Normal rate and regular rhythm.     Pulmonary/Chest: Breath sounds normal. No transcription, some errors in transcription mayhave occurred.

## 2018-11-02 ENCOUNTER — OFFICE VISIT (OUTPATIENT)
Dept: PODIATRY | Age: 28
End: 2018-11-02
Payer: COMMERCIAL

## 2018-11-02 VITALS
DIASTOLIC BLOOD PRESSURE: 80 MMHG | HEART RATE: 87 BPM | SYSTOLIC BLOOD PRESSURE: 123 MMHG | HEIGHT: 74 IN | BODY MASS INDEX: 34.67 KG/M2

## 2018-11-02 DIAGNOSIS — G90.521 COMPLEX REGIONAL PAIN SYNDROME TYPE 1 OF RIGHT LOWER EXTREMITY: Primary | ICD-10-CM

## 2018-11-02 DIAGNOSIS — Z98.890 POST-OPERATIVE STATE: ICD-10-CM

## 2018-11-02 DIAGNOSIS — S92.351A DISPLACED FRACTURE OF FIFTH METATARSAL BONE, RIGHT FOOT, INITIAL ENCOUNTER FOR CLOSED FRACTURE: ICD-10-CM

## 2018-11-02 DIAGNOSIS — M85.871 OSTEOPENIA OF RIGHT FOOT: ICD-10-CM

## 2018-11-02 DIAGNOSIS — M79.671 PAIN OF RIGHT FOOT: ICD-10-CM

## 2018-11-02 PROCEDURE — 99024 POSTOP FOLLOW-UP VISIT: CPT | Performed by: PODIATRIST

## 2018-11-02 PROCEDURE — 99212 OFFICE O/P EST SF 10 MIN: CPT | Performed by: PODIATRIST

## 2018-11-02 NOTE — PROGRESS NOTES
[address]  [address] [place] [address] [address] [facility], [address] [facility]  Tel: [phone]   Fax: [phone]    Subjective     CC: S/p ORIF right 5th metatarsal fracture. CRPS    HPI:  Brooke Tejada is a 34y.o. year old male who presents to clinic today for follow up to ORIF right 5th metatarsal. (DOS 8/29/18, Dr. Rianna Orantes). Patient was recently admitted to the hospital from 9/24/18-9/27/18) for suspected exposed hardware and suspected osteomyelitis. He has remained NWB in a CAM boot. Continues to have pain out of proportion to right foot. Patient went to pain management; who agreed with diagnosis of complex regional pain syndrome. Pain management recommended lyrica and physical therapy Denies other complaints. Denies N/F/V/C. Primary care physician is Savannah Simons MD.    ROS:    Constitutional: Reports occasional fevers and chills none currently. Denies nausea/vomiting. Neurologic: Denies numbness, tingling, and burning in the feet. Vascular: Denies symptoms of lower extremity claudication. Skin: Denies open wounds. Otherwise negative except as noted in the HPI.      PMH:  Past Medical History:   Diagnosis Date    Acute osteomyelitis of left foot ([address]     Cellulitis 9/24/2018    Cellulitis and abscess of foot 9/24/2018    Closed displaced fracture of base of fourth metacarpal bone of right hand 7/23/2018    Foot fracture, right 08/2018    GERD (gastroesophageal reflux disease)     no longer on RX    Hand fracture, right 08/2018       Surgical History:   Past Surgical History:   Procedure Laterality Date    FINGER CLOSED REDUCTION  07/25/2018    CLOSED REDUCTION PERC PINNING SMALL AND FINGER FINGER METACARPAL BASE -    OPEN TREATMENT METATARSAL FRACTURE EACH Right 8/29/2018    OPEN REDUCTION INTERNAL FIXATION 5TH METATARSAL, MEDIAL MALLEOLUS ORIF  (ODETTE Mercado - JOYCE ALFARO) performed by Silvia Stephenson DPM at [place] [address]  08/29/2018     OPEN REDUCTION INTERNAL are provided for review. Dose modulation, iterative reconstruction, and/or weight based adjustment of the mA/kV was utilized to reduce the radiation dose to as low as reasonably achievable. COMPARISON: None. HISTORY ORDERING SYSTEM PROVIDED HISTORY: Displaced fracture of fifth metatarsal bone, right foot, initial encounter for closed fracture TECHNOLOGIST PROVIDED HISTORY: evaluate fracture healing, osteopenia vs crps? FINDINGS: Bones: Status post plate and screw fixation of the proximal 5th metatarsal. There is no evidence for hardware complication or loosening. No definite additional fractures are identified. There is a diffuse pattern of tiny cyst-like osteopenia with a juxta articular predominance. Soft Tissue: There is mild subcutaneous soft tissue edema along the dorsum of the foot. No drainable fluid collection identified. Visualized course of the ankle tendons appear intact. Joint:  No abnormal joint effusion identified. Joint spaces are maintained. Subtle osteophyte formation involving the anterior tibia. Os trigonum variant identified. Postsurgical changes of the 5th metatarsal.  No evidence for hardware complication. Diffuse osteopenia with periarticular predominance. Differential considerations include complex regional pain syndrome and/or disuse osteopenia. Subcutaneous soft tissue edema along the dorsum of the foot without drainable fluid collection. Vini Gravin is a 34 y.o. male with     Diagnosis Orders   1. Complex regional pain syndrome type 1 of right lower extremity     2. Post-operative state     3. Pain of right foot     4. Osteopenia of right foot     5. Displaced fracture of fifth metatarsal bone, right foot, initial encounter for closed fracture          Plan   · Patient examined and evaluated with Dr. Mary Main  · Diagnosis and treatment options discussed in detail  · No dressings needed as wound is healed.    · Reviewed imaging and notes from Infectious

## 2018-11-08 ENCOUNTER — TELEPHONE (OUTPATIENT)
Dept: INFECTIOUS DISEASES | Age: 28
End: 2018-11-08

## 2018-11-09 ENCOUNTER — TELEPHONE (OUTPATIENT)
Dept: PODIATRY | Age: 28
End: 2018-11-09

## 2018-11-14 ENCOUNTER — OFFICE VISIT (OUTPATIENT)
Dept: PODIATRY | Age: 28
End: 2018-11-14
Payer: COMMERCIAL

## 2018-11-14 ENCOUNTER — HOSPITAL ENCOUNTER (OUTPATIENT)
Dept: PAIN MANAGEMENT | Age: 28
Discharge: HOME | End: 2018-11-14
Payer: COMMERCIAL

## 2018-11-14 VITALS
HEIGHT: 74 IN | HEART RATE: 88 BPM | BODY MASS INDEX: 34.66 KG/M2 | DIASTOLIC BLOOD PRESSURE: 71 MMHG | WEIGHT: 270.06 LBS | SYSTOLIC BLOOD PRESSURE: 130 MMHG

## 2018-11-14 DIAGNOSIS — S92.352G CLOSED DISPLACED FRACTURE OF FIFTH METATARSAL BONE OF LEFT FOOT WITH DELAYED HEALING, SUBSEQUENT ENCOUNTER: ICD-10-CM

## 2018-11-14 DIAGNOSIS — G90.521 COMPLEX REGIONAL PAIN SYNDROME TYPE 1 OF RIGHT LOWER EXTREMITY: ICD-10-CM

## 2018-11-14 DIAGNOSIS — S92.351A DISPLACED FRACTURE OF FIFTH METATARSAL BONE, RIGHT FOOT, INITIAL ENCOUNTER FOR CLOSED FRACTURE: ICD-10-CM

## 2018-11-14 DIAGNOSIS — M79.671 PAIN OF RIGHT FOOT: Primary | ICD-10-CM

## 2018-11-14 DIAGNOSIS — G90.521 COMPLEX REGIONAL PAIN SYNDROME TYPE 1 OF RIGHT LOWER EXTREMITY: Primary | ICD-10-CM

## 2018-11-14 DIAGNOSIS — R60.0 EDEMA OF LOWER EXTREMITY: ICD-10-CM

## 2018-11-14 DIAGNOSIS — G89.29 CHRONIC FOOT PAIN, RIGHT: ICD-10-CM

## 2018-11-14 DIAGNOSIS — M79.671 CHRONIC FOOT PAIN, RIGHT: ICD-10-CM

## 2018-11-14 PROCEDURE — G8427 DOCREV CUR MEDS BY ELIG CLIN: HCPCS | Performed by: STUDENT IN AN ORGANIZED HEALTH CARE EDUCATION/TRAINING PROGRAM

## 2018-11-14 PROCEDURE — 1036F TOBACCO NON-USER: CPT | Performed by: STUDENT IN AN ORGANIZED HEALTH CARE EDUCATION/TRAINING PROGRAM

## 2018-11-14 PROCEDURE — 99212 OFFICE O/P EST SF 10 MIN: CPT | Performed by: STUDENT IN AN ORGANIZED HEALTH CARE EDUCATION/TRAINING PROGRAM

## 2018-11-14 PROCEDURE — 99213 OFFICE O/P EST LOW 20 MIN: CPT | Performed by: STUDENT IN AN ORGANIZED HEALTH CARE EDUCATION/TRAINING PROGRAM

## 2018-11-14 PROCEDURE — G8417 CALC BMI ABV UP PARAM F/U: HCPCS | Performed by: STUDENT IN AN ORGANIZED HEALTH CARE EDUCATION/TRAINING PROGRAM

## 2018-11-14 PROCEDURE — 99213 OFFICE O/P EST LOW 20 MIN: CPT | Performed by: NURSE PRACTITIONER

## 2018-11-14 PROCEDURE — 99214 OFFICE O/P EST MOD 30 MIN: CPT

## 2018-11-14 PROCEDURE — G8484 FLU IMMUNIZE NO ADMIN: HCPCS | Performed by: STUDENT IN AN ORGANIZED HEALTH CARE EDUCATION/TRAINING PROGRAM

## 2018-11-14 RX ORDER — PREGABALIN 75 MG/1
75 CAPSULE ORAL 3 TIMES DAILY
Qty: 90 CAPSULE | Refills: 0 | Status: SHIPPED | OUTPATIENT
Start: 2018-11-14 | End: 2018-12-11 | Stop reason: SDUPTHER

## 2018-11-14 RX ORDER — PREGABALIN 75 MG/1
75 CAPSULE ORAL 3 TIMES DAILY
Qty: 90 CAPSULE | Refills: 0 | Status: SHIPPED | OUTPATIENT
Start: 2018-11-14 | End: 2018-11-14 | Stop reason: SDUPTHER

## 2018-11-14 ASSESSMENT — ENCOUNTER SYMPTOMS
CONSTIPATION: 0
SHORTNESS OF BREATH: 0
COUGH: 0

## 2018-11-14 NOTE — PROGRESS NOTES
reformatted images are provided for review. Dose modulation, iterative reconstruction, and/or weight based adjustment of the mA/kV was utilized to reduce the radiation dose to as low as reasonably achievable. COMPARISON: None. HISTORY ORDERING SYSTEM PROVIDED HISTORY: Displaced fracture of fifth metatarsal bone, right foot, initial encounter for closed fracture TECHNOLOGIST PROVIDED HISTORY: evaluate fracture healing, osteopenia vs crps? FINDINGS: Bones: Status post plate and screw fixation of the proximal 5th metatarsal. There is no evidence for hardware complication or loosening. No definite additional fractures are identified. There is a diffuse pattern of tiny cyst-like osteopenia with a juxta articular predominance. Soft Tissue: There is mild subcutaneous soft tissue edema along the dorsum of the foot. No drainable fluid collection identified. Visualized course of the ankle tendons appear intact. Joint:  No abnormal joint effusion identified. Joint spaces are maintained. Subtle osteophyte formation involving the anterior tibia. Os trigonum variant identified. Postsurgical changes of the 5th metatarsal.  No evidence for hardware complication. Diffuse osteopenia with periarticular predominance. Differential considerations include complex regional pain syndrome and/or disuse osteopenia. Subcutaneous soft tissue edema along the dorsum of the foot without drainable fluid collection. Rory Hernandez is a 34 y.o. male with     Diagnosis Orders   1. Pain of right foot  XR FOOT RIGHT (MIN 3 VIEWS)   2. Displaced fracture of fifth metatarsal bone, right foot, initial encounter for closed fracture  XR FOOT RIGHT (MIN 3 VIEWS)   3.  Complex regional pain syndrome type 1 of right lower extremity  XR FOOT RIGHT (MIN 3 VIEWS)   4. Edema of lower extremity          Plan   · Patient examined and evaluated with Dr. Marcello Holder  · Diagnosis and treatment options discussed in detail  · No dressings needed

## 2018-11-14 NOTE — PROGRESS NOTES
Visualized course of  the ankle tendons appear intact.     Joint:  No abnormal joint effusion identified.  Joint spaces are maintained. Subtle osteophyte formation involving the anterior tibia.  Os trigonum  variant identified.        Impression  Postsurgical changes of the 5th metatarsal.  No evidence for hardware  complication.     Diffuse osteopenia with periarticular predominance.  Differential  considerations include complex regional pain syndrome and/or disuse  osteopenia.     Subcutaneous soft tissue edema along the dorsum of the foot without drainable  fluid collection. Assessment:  Problem List Items Addressed This Visit     Chronic foot pain, right    Relevant Medications    pregabalin (LYRICA) 75 MG capsule    Complex regional pain syndrome type 1 of right lower extremity - Primary    Relevant Medications    pregabalin (LYRICA) 75 MG capsule    Displaced fracture of fifth metatarsal bone of left foot with delayed healing            Treatment Plan:  DISCUSSION: Treatment options discussed with patient and allquestions answered to patient's satisfaction.     TREATMENT OPTIONS:   Increase lyrica to 75 mg TID  Pt seeing podiatrist today for further POC  Return in 4 weeks with Dr. Mariola Richards

## 2018-11-19 ASSESSMENT — ENCOUNTER SYMPTOMS
GASTROINTESTINAL NEGATIVE: 1
EYES NEGATIVE: 1
RESPIRATORY NEGATIVE: 1
ALLERGIC/IMMUNOLOGIC NEGATIVE: 1

## 2018-11-28 ENCOUNTER — HOSPITAL ENCOUNTER (OUTPATIENT)
Age: 28
Discharge: HOME | End: 2018-11-30
Payer: COMMERCIAL

## 2018-11-28 ENCOUNTER — HOSPITAL ENCOUNTER (OUTPATIENT)
Dept: GENERAL RADIOLOGY | Age: 28
Discharge: HOME | End: 2018-11-30
Payer: COMMERCIAL

## 2018-11-28 ENCOUNTER — OFFICE VISIT (OUTPATIENT)
Dept: PODIATRY | Age: 28
End: 2018-11-28
Payer: COMMERCIAL

## 2018-11-28 VITALS
SYSTOLIC BLOOD PRESSURE: 128 MMHG | DIASTOLIC BLOOD PRESSURE: 82 MMHG | BODY MASS INDEX: 34.67 KG/M2 | HEIGHT: 74 IN | HEART RATE: 71 BPM

## 2018-11-28 DIAGNOSIS — M79.671 PAIN OF RIGHT FOOT: Primary | ICD-10-CM

## 2018-11-28 DIAGNOSIS — M85.871 OSTEOPENIA OF RIGHT FOOT: ICD-10-CM

## 2018-11-28 DIAGNOSIS — M79.671 PAIN OF RIGHT FOOT: ICD-10-CM

## 2018-11-28 DIAGNOSIS — S92.351A DISPLACED FRACTURE OF FIFTH METATARSAL BONE, RIGHT FOOT, INITIAL ENCOUNTER FOR CLOSED FRACTURE: ICD-10-CM

## 2018-11-28 DIAGNOSIS — G90.521 COMPLEX REGIONAL PAIN SYNDROME TYPE 1 OF RIGHT LOWER EXTREMITY: ICD-10-CM

## 2018-11-28 DIAGNOSIS — R60.0 EDEMA OF LOWER EXTREMITY: ICD-10-CM

## 2018-11-28 PROCEDURE — 73630 X-RAY EXAM OF FOOT: CPT

## 2018-11-28 PROCEDURE — G8417 CALC BMI ABV UP PARAM F/U: HCPCS | Performed by: STUDENT IN AN ORGANIZED HEALTH CARE EDUCATION/TRAINING PROGRAM

## 2018-11-28 PROCEDURE — 1036F TOBACCO NON-USER: CPT | Performed by: STUDENT IN AN ORGANIZED HEALTH CARE EDUCATION/TRAINING PROGRAM

## 2018-11-28 PROCEDURE — G8484 FLU IMMUNIZE NO ADMIN: HCPCS | Performed by: STUDENT IN AN ORGANIZED HEALTH CARE EDUCATION/TRAINING PROGRAM

## 2018-11-28 PROCEDURE — 99212 OFFICE O/P EST SF 10 MIN: CPT | Performed by: STUDENT IN AN ORGANIZED HEALTH CARE EDUCATION/TRAINING PROGRAM

## 2018-11-28 PROCEDURE — G8427 DOCREV CUR MEDS BY ELIG CLIN: HCPCS | Performed by: STUDENT IN AN ORGANIZED HEALTH CARE EDUCATION/TRAINING PROGRAM

## 2018-11-28 NOTE — PROGRESS NOTES
[address]  [address] [place] [address], Terrell Guzman  Tel: [phone]   Fax: [phone]    Subjective     CC: S/p ORIF right 5th metatarsal fracture. CRPS    HPI:  Steve Alas is a 34y.o. year old male who presents to clinic today for follow up to ORIF right 5th metatarsal. (DOS 8/29/18, Dr. Noe Elizabeth). Patient was recently admitted to the hospital from 9/24/18-9/27/18) for suspected exposed hardware and suspected osteomyelitis, currently on Levaquin. He has remained WBAT in a CAM boot. Continues to have pain out of proportion to right foot. Patient went to pain management; who agreed with diagnosis of complex regional pain syndrome. Pain management recommended lyrica and physical therapy. There is possibility for lumbar sympathetic blocks however there is concern for infection due to him being on antibiotics. He has appointment with ID on Dec 3 and an appointment with pain mgmt on Dec 11. He is now at home getting home PT 3x per week. He reports improved ROM, mobility with PT. Has been WBAT in CAM boot and is working with PT nwb exercises. Denies other complaints. Denies N/F/V/C. Primary care physician is Faith Lay MD.    ROS:    Constitutional: Reports occasional fevers and chills none currently. Denies nausea/vomiting. Neurologic: Denies numbness, tingling, and burning in the feet. Vascular: Denies symptoms of lower extremity claudication. Skin: Denies open wounds. Otherwise negative except as noted in the HPI.      PMH:  Past Medical History:   Diagnosis Date    Acute osteomyelitis of left foot ([address]     Cellulitis 9/24/2018    Cellulitis and abscess of foot 9/24/2018    Closed displaced fracture of base of fourth metacarpal bone of right hand 7/23/2018    Foot fracture, right 08/2018    GERD (gastroesophageal reflux disease)     no longer on RX    Hand fracture, right 08/2018       Surgical History:   Past Surgical History:   Procedure Laterality Date    lower extremity     5. Osteopenia of right foot          Plan   · Patient examined and evaluated with Dr. Vonda Gillis  · Diagnosis and treatment options discussed in detail  · Reviewed imaging and notes from Infectious disease and Pain Management. · Continue with ABX until end date. F/u with Dr. Charlena Lombard Dec 3    · Continue lyrica and physical therapy per pain management. F/u with Dr. Marisol Kang Dec 11  · Continue weight bearing in the CAM boot. · XR right foot - reviewed osteopenia appears improved especially in region of midfoot. · Disability paperwork completed and given to patient. · Physical therapy: treat CRPS, work on weight bearing as tolerated in boot, and non-weight bearing range of motion exercises. Continue light resistance band exercises   · RTC 2 weeks    · Discussed case with Dr. Obed Cadena    No orders of the defined types were placed in this encounter. No orders of the defined types were placed in this encounter.     Electronically signed by James Phillips DPM on 11/28/2018 at 5:46 PM

## 2018-11-29 ENCOUNTER — TELEPHONE (OUTPATIENT)
Dept: PODIATRY | Age: 28
End: 2018-11-29

## 2018-12-03 ENCOUNTER — APPOINTMENT (OUTPATIENT)
Dept: GENERAL RADIOLOGY | Age: 28
End: 2018-12-03
Payer: COMMERCIAL

## 2018-12-03 ENCOUNTER — HOSPITAL ENCOUNTER (EMERGENCY)
Age: 28
Discharge: HOME | End: 2018-12-03
Attending: EMERGENCY MEDICINE
Payer: COMMERCIAL

## 2018-12-03 ENCOUNTER — OFFICE VISIT (OUTPATIENT)
Dept: INFECTIOUS DISEASES | Age: 28
End: 2018-12-03
Payer: COMMERCIAL

## 2018-12-03 VITALS
HEART RATE: 77 BPM | WEIGHT: 290 LBS | BODY MASS INDEX: 37.22 KG/M2 | HEIGHT: 74 IN | TEMPERATURE: 99.1 F | DIASTOLIC BLOOD PRESSURE: 80 MMHG | SYSTOLIC BLOOD PRESSURE: 129 MMHG

## 2018-12-03 VITALS
SYSTOLIC BLOOD PRESSURE: 117 MMHG | DIASTOLIC BLOOD PRESSURE: 84 MMHG | OXYGEN SATURATION: 97 % | HEART RATE: 89 BPM | RESPIRATION RATE: 14 BRPM | TEMPERATURE: 98.2 F

## 2018-12-03 DIAGNOSIS — M86.071 ACUTE HEMATOGENOUS OSTEOMYELITIS OF RIGHT FOOT (HCC): Primary | ICD-10-CM

## 2018-12-03 DIAGNOSIS — S93.601A SPRAIN OF RIGHT FOOT, INITIAL ENCOUNTER: Primary | ICD-10-CM

## 2018-12-03 PROCEDURE — 73610 X-RAY EXAM OF ANKLE: CPT

## 2018-12-03 PROCEDURE — 99284 EMERGENCY DEPT VISIT MOD MDM: CPT

## 2018-12-03 PROCEDURE — 99214 OFFICE O/P EST MOD 30 MIN: CPT | Performed by: INTERNAL MEDICINE

## 2018-12-03 PROCEDURE — G8417 CALC BMI ABV UP PARAM F/U: HCPCS | Performed by: INTERNAL MEDICINE

## 2018-12-03 PROCEDURE — G8484 FLU IMMUNIZE NO ADMIN: HCPCS | Performed by: INTERNAL MEDICINE

## 2018-12-03 PROCEDURE — G8427 DOCREV CUR MEDS BY ELIG CLIN: HCPCS | Performed by: INTERNAL MEDICINE

## 2018-12-03 PROCEDURE — 96372 THER/PROPH/DIAG INJ SC/IM: CPT

## 2018-12-03 PROCEDURE — 73630 X-RAY EXAM OF FOOT: CPT

## 2018-12-03 PROCEDURE — 1036F TOBACCO NON-USER: CPT | Performed by: INTERNAL MEDICINE

## 2018-12-03 PROCEDURE — 6360000002 HC RX W HCPCS: Performed by: EMERGENCY MEDICINE

## 2018-12-03 RX ORDER — ACETAMINOPHEN 325 MG/1
325 TABLET ORAL EVERY 6 HOURS PRN
Qty: 30 TABLET | Refills: 0 | Status: ON HOLD | OUTPATIENT
Start: 2018-12-03 | End: 2019-05-28 | Stop reason: HOSPADM

## 2018-12-03 RX ORDER — KETOROLAC TROMETHAMINE 10 MG/1
10 TABLET, FILM COATED ORAL EVERY 6 HOURS PRN
Qty: 20 TABLET | Refills: 0 | Status: SHIPPED | OUTPATIENT
Start: 2018-12-03 | End: 2018-12-11 | Stop reason: ALTCHOICE

## 2018-12-03 RX ORDER — KETOROLAC TROMETHAMINE 30 MG/ML
15 INJECTION, SOLUTION INTRAMUSCULAR; INTRAVENOUS ONCE
Status: COMPLETED | OUTPATIENT
Start: 2018-12-03 | End: 2018-12-03

## 2018-12-03 RX ADMIN — KETOROLAC TROMETHAMINE 15 MG: 30 INJECTION, SOLUTION INTRAMUSCULAR at 17:06

## 2018-12-03 ASSESSMENT — ENCOUNTER SYMPTOMS
EYES NEGATIVE: 1
ALLERGIC/IMMUNOLOGIC NEGATIVE: 1
RESPIRATORY NEGATIVE: 1
GASTROINTESTINAL NEGATIVE: 1

## 2018-12-03 ASSESSMENT — PAIN DESCRIPTION - DESCRIPTORS: DESCRIPTORS: ACHING

## 2018-12-03 ASSESSMENT — PAIN SCALES - GENERAL
PAINLEVEL_OUTOF10: 10
PAINLEVEL_OUTOF10: 10

## 2018-12-03 ASSESSMENT — PAIN SCALES - WONG BAKER: WONGBAKER_NUMERICALRESPONSE: 0

## 2018-12-03 ASSESSMENT — PAIN DESCRIPTION - ORIENTATION: ORIENTATION: RIGHT

## 2018-12-03 ASSESSMENT — PAIN DESCRIPTION - PAIN TYPE: TYPE: ACUTE PAIN

## 2018-12-03 ASSESSMENT — PAIN DESCRIPTION - LOCATION: LOCATION: FOOT;ANKLE

## 2018-12-03 NOTE — PROGRESS NOTES
pregabalin (LYRICA) 75 MG capsule, Take 1 capsule by mouth 3 times daily for 30 days. ., Disp: 90 capsule, Rfl: 0    HYDROcodone-acetaminophen (NORCO) 5-325 MG per tablet, Take 1 tablet by mouth every 6 hours as needed for Pain. ., Disp: , Rfl:     cyclobenzaprine (FLEXERIL) 10 MG tablet, Take 10 mg by mouth 3 times daily as needed for Muscle spasms, Disp: , Rfl:     docusate sodium (COLACE, DULCOLAX) 100 MG CAPS, Take 100 mg by mouth daily as needed for Constipation, Disp: 12 capsule, Rfl: 0    Misc. Devices (FREE SPIRIT KNEE/LEG WALKER) MISC, Please use walker to mobilize until cleared by podiatry to bear weight on foot., Disp: 1 each, Rfl: 0    ketorolac (TORADOL) 10 MG tablet, Take 1 tablet by mouth every 6 hours as needed for Pain, Disp: 20 tablet, Rfl: 0    acetaminophen (TYLENOL) 325 MG tablet, Take 1 tablet by mouth every 6 hours as needed for Pain, Disp: 30 tablet, Rfl: 0      Social History:     Social History     Social History    Marital status: Single     Spouse name: N/A    Number of children: N/A    Years of education: N/A     Occupational History    Not on file. Social History Main Topics    Smoking status: Former Smoker     Packs/day: 0.50     Years: 3.00     Types: Cigarettes     Quit date: 1/18/2018    Smokeless tobacco: Never Used    Alcohol use 0.0 oz/week      Comment: social    Drug use: No    Sexual activity: Yes     Partners: Female     Other Topics Concern    Not on file     Social History Narrative    No narrative on file       Family History:     Family History   Problem Relation Age of Onset    Diabetes Mother     Hypertension Father     Mental Illness Father     Other Neg Hx         blood clots        Allergies:   Patient has no known allergies. Review of Systems:   Review of Systems   Constitutional: Negative. HENT: Negative. Eyes: Negative. Respiratory: Negative. Cardiovascular: Negative. Gastrointestinal: Negative. Endocrine: Negative. Genitourinary: Negative. Musculoskeletal: Positive for arthralgias. Continues to have pain in the left foot with swelling and severe tenderness when he puts his foot down. Skin: Negative. Lateral R foot with a small scab healed. Allergic/Immunologic: Negative. Neurological: Negative. Hematological: Negative. Psychiatric/Behavioral: Negative. Physical Examination :   Blood pressure 129/80, pulse 77, temperature 99.1 °F (37.3 °C), height 6' 2\" (1.88 m), weight 290 lb (131.5 kg). Physical Exam   Constitutional: He is oriented to person, place, and time. He appears well-developed and well-nourished. HENT:   Head: Normocephalic and atraumatic. Eyes: Conjunctivae are normal. No scleral icterus. Cardiovascular: Normal rate and regular rhythm. Pulmonary/Chest: Breath sounds normal. No respiratory distress. Abdominal: Soft. There is no tenderness. Genitourinary:   Genitourinary Comments: Urine clear and no juarez   Musculoskeletal: He exhibits no edema or deformity. Neurological: He is alert and oriented to person, place, and time. Skin: Skin is warm and dry. No erythema. Left foot swelling without redness, with tenderness   Psychiatric: He has a normal mood and affect.  His behavior is normal.         Medical Decision Making:   I have independently reviewed/ordered the following labs:    CBCwith Differential:   Lab Results   Component Value Date    WBC 13.1 09/25/2018    WBC 10.1 09/05/2018    HGB 13.8 09/25/2018    HGB 14.6 09/05/2018    HCT 41.3 09/25/2018    HCT 43.4 09/05/2018     09/25/2018     09/05/2018    LYMPHOPCT 16 09/25/2018    LYMPHOPCT 15 09/05/2018    MONOPCT 7 09/25/2018    MONOPCT 8 09/05/2018     BMP:  Lab Results   Component Value Date     09/24/2018     04/09/2015    K 3.6 09/24/2018    K 3.4 04/09/2015     09/24/2018     04/09/2015    CO2 23 09/24/2018    CO2 24 04/09/2015    BUN 5 09/24/2018    BUN 9

## 2018-12-03 NOTE — ED PROVIDER NOTES
[facility]  ED  Emergency Department Encounter  Emergency Medicine Resident     Pt Name: Ailyn Chadwick  MRN: [id number]  [place] 1990  Date of evaluation: 12/3/18  PCP:  Bernice English MD    [address] [place] [address]       Chief Complaint   Patient presents with    Foot Pain    Foot Swelling       HISTORY OF PRESENT ILLNESS  (Location/Symptom, Timing/Onset, Context/Setting, Quality, Duration, Modifying Factors, Severity.)      Ailyn Chadwick is a 34 y.o. male who presents with acute  right ankle pain for the last couple of hours. It started after he was being seen by infectious disease due to osteomyelitis on hardward and fell out of the wheel chair onto his malleolus while he was wearing his boot from podiatry which he got due to his ORIF of 5th metatarsal on 8/29  The pain is sharp and intermittent non radiating. OTC taken. Inadequate pain relief. Pressure/movement aggrevates symptoms and resting relieves. It is not associated with any numbness, tingling, burning and no other trauma noted unless noted above. Moderate severity. PAST MEDICAL / SURGICAL / SOCIAL / FAMILY HISTORY      has a past medical history of Acute osteomyelitis of left foot ([facility] [address] Cellulitis; Cellulitis and abscess of foot; Closed displaced fracture of base of fourth metacarpal bone of right hand; Foot fracture, right; GERD (gastroesophageal reflux disease); and Hand fracture, right.     has a past surgical history that includes [place] tooth extraction; Finger Closed Reduction (07/25/2018); pr office/outpt visit,procedure only (Right, 7/25/2018); Upper gastrointestinal endoscopy; other surgical history (08/29/2018); other surgical history (Right, 08/29/2018); and open treatment metatarsal fracture each (Right, 8/29/2018). Social History     Social History    Marital status: Single     Spouse name: N/A    Number of children: N/A    Years of education: N/A     Occupational History    Not on file.      Social History

## 2018-12-03 NOTE — LETTER
[facility]  [address] [facility] [address]  Phone: [phone]               December 3, 2018    Patient: Nita Ramirez   YOB: 1990   Date of Visit: 12/3/2018       To Whom It May Concern:    Katarzyna Johnson was seen and treated in our emergency department on 12/3/2018. He was escorted by his mother during the visit, please excuse her from work on this date. She can return to work on 12/4/2018.      Sincerely,       Nalini Winslow RN         Signature:__________________________________

## 2018-12-11 ENCOUNTER — HOSPITAL ENCOUNTER (OUTPATIENT)
Dept: PAIN MANAGEMENT | Age: 28
Discharge: HOME | End: 2018-12-11
Payer: COMMERCIAL

## 2018-12-11 VITALS
SYSTOLIC BLOOD PRESSURE: 129 MMHG | HEIGHT: 74 IN | DIASTOLIC BLOOD PRESSURE: 82 MMHG | TEMPERATURE: 98.7 F | HEART RATE: 85 BPM | WEIGHT: 290 LBS | BODY MASS INDEX: 37.22 KG/M2 | RESPIRATION RATE: 16 BRPM

## 2018-12-11 DIAGNOSIS — G90.521 COMPLEX REGIONAL PAIN SYNDROME TYPE 1 OF RIGHT LOWER EXTREMITY: Primary | ICD-10-CM

## 2018-12-11 DIAGNOSIS — G89.29 CHRONIC FOOT PAIN, RIGHT: ICD-10-CM

## 2018-12-11 DIAGNOSIS — M79.671 CHRONIC FOOT PAIN, RIGHT: ICD-10-CM

## 2018-12-11 PROCEDURE — 99215 OFFICE O/P EST HI 40 MIN: CPT

## 2018-12-11 PROCEDURE — 99214 OFFICE O/P EST MOD 30 MIN: CPT | Performed by: ANESTHESIOLOGY

## 2018-12-11 RX ORDER — CELECOXIB 100 MG/1
100 CAPSULE ORAL 2 TIMES DAILY
Qty: 60 CAPSULE | Refills: 0 | Status: SHIPPED | OUTPATIENT
Start: 2018-12-11 | End: 2019-01-15 | Stop reason: SDUPTHER

## 2018-12-11 RX ORDER — CELECOXIB 100 MG/1
100 CAPSULE ORAL 2 TIMES DAILY
COMMUNITY
End: 2018-12-11 | Stop reason: SDUPTHER

## 2018-12-11 RX ORDER — DULOXETIN HYDROCHLORIDE 30 MG/1
30 CAPSULE, DELAYED RELEASE ORAL DAILY
COMMUNITY
End: 2018-12-11 | Stop reason: SDUPTHER

## 2018-12-11 RX ORDER — TOPIRAMATE 50 MG/1
50 TABLET, FILM COATED ORAL DAILY
COMMUNITY
End: 2018-12-11 | Stop reason: SDUPTHER

## 2018-12-11 RX ORDER — PREGABALIN 75 MG/1
75 CAPSULE ORAL 3 TIMES DAILY
Qty: 90 CAPSULE | Refills: 0 | Status: SHIPPED | OUTPATIENT
Start: 2018-12-14 | End: 2019-01-15 | Stop reason: SDUPTHER

## 2018-12-11 RX ORDER — TOPIRAMATE 50 MG/1
50 TABLET, FILM COATED ORAL NIGHTLY
Qty: 30 TABLET | Refills: 0 | Status: SHIPPED | OUTPATIENT
Start: 2018-12-11 | End: 2019-01-03

## 2018-12-11 RX ORDER — DULOXETIN HYDROCHLORIDE 30 MG/1
30 CAPSULE, DELAYED RELEASE ORAL DAILY
Qty: 30 CAPSULE | Refills: 0 | Status: SHIPPED | OUTPATIENT
Start: 2018-12-11 | End: 2019-01-15 | Stop reason: SDUPTHER

## 2018-12-11 ASSESSMENT — PAIN DESCRIPTION - FREQUENCY: FREQUENCY: CONTINUOUS

## 2018-12-11 ASSESSMENT — PAIN DESCRIPTION - PROGRESSION: CLINICAL_PROGRESSION: GRADUALLY WORSENING

## 2018-12-11 ASSESSMENT — PAIN DESCRIPTION - LOCATION: LOCATION: FOOT

## 2018-12-11 ASSESSMENT — PAIN SCALES - GENERAL: PAINLEVEL_OUTOF10: 8

## 2018-12-11 ASSESSMENT — PAIN DESCRIPTION - ORIENTATION: ORIENTATION: RIGHT

## 2018-12-11 ASSESSMENT — PAIN DESCRIPTION - PAIN TYPE: TYPE: CHRONIC PAIN

## 2018-12-11 ASSESSMENT — PAIN DESCRIPTION - ONSET: ONSET: PROGRESSIVE

## 2018-12-11 NOTE — PROGRESS NOTES
INTERNAL FIXATION 5TH METATARSAL, MEDIAL MALLEOLUS ORIF  (PARAGON 28 - JOYCE SLOAT) performed by Kaylan Schumacher DPM at [facility] 6  08/29/2018     OPEN REDUCTION INTERNAL FIXATION 5TH METATARSAL, MEDIAL MALLEOLUS ORIF  (PARAGON 28 - JOYCE SLOAT) (Right Foot)    OTHER SURGICAL HISTORY Right 08/29/2018    ORIF 5th metatarsal    DE OFFICE/OUTPT VISIT,PROCEDURE ONLY Right 7/25/2018    CLOSED REDUCTION PERC PINNING SMALL AND FINGER FINGER METACARPAL BASE - C ARM, SYNTHES performed by Deepali Garcia MD at [place] [address] EXTRACTION         Medications  Current Outpatient Prescriptions   Medication Sig Dispense Refill    ketorolac (TORADOL) 10 MG tablet Take 1 tablet by mouth every 6 hours as needed for Pain 20 tablet 0    acetaminophen (TYLENOL) 325 MG tablet Take 1 tablet by mouth every 6 hours as needed for Pain 30 tablet 0    pregabalin (LYRICA) 75 MG capsule Take 1 capsule by mouth 3 times daily for 30 days. . 90 capsule 0    HYDROcodone-acetaminophen (NORCO) 5-325 MG per tablet Take 1 tablet by mouth every 6 hours as needed for Pain. [place] cyclobenzaprine (FLEXERIL) 10 MG tablet Take 10 mg by mouth 3 times daily as needed for Muscle spasms      docusate sodium (COLACE, DULCOLAX) 100 MG CAPS Take 100 mg by mouth daily as needed for Constipation 12 capsule 0    Misc. Devices (FREE SPIRIT KNEE/LEG WALKER) MISC Please use walker to mobilize until cleared by podiatry to bear weight on foot. 1 each 0     No current facility-administered medications for this encounter. Allergies  Patient has no known allergies. Family History  family history includes Diabetes in his mother; Hypertension in his father; Mental Illness in his father.       Social History  Social History     Social History    Marital status: Single     Spouse name: N/A    Number of children: N/A    Years of education: N/A     Social History Main Topics    Smoking

## 2018-12-12 ENCOUNTER — OFFICE VISIT (OUTPATIENT)
Dept: PODIATRY | Age: 28
End: 2018-12-12
Payer: COMMERCIAL

## 2018-12-12 VITALS
WEIGHT: 290 LBS | HEART RATE: 85 BPM | HEIGHT: 74 IN | DIASTOLIC BLOOD PRESSURE: 80 MMHG | SYSTOLIC BLOOD PRESSURE: 134 MMHG | BODY MASS INDEX: 37.22 KG/M2 | RESPIRATION RATE: 20 BRPM

## 2018-12-12 DIAGNOSIS — S92.351S CLOSED DISPLACED FRACTURE OF FIFTH METATARSAL BONE OF RIGHT FOOT, SEQUELA: ICD-10-CM

## 2018-12-12 DIAGNOSIS — M79.671 PAIN OF RIGHT FOOT: ICD-10-CM

## 2018-12-12 DIAGNOSIS — G90.521 COMPLEX REGIONAL PAIN SYNDROME TYPE 1 OF RIGHT LOWER EXTREMITY: Primary | ICD-10-CM

## 2018-12-12 PROCEDURE — 99212 OFFICE O/P EST SF 10 MIN: CPT | Performed by: STUDENT IN AN ORGANIZED HEALTH CARE EDUCATION/TRAINING PROGRAM

## 2018-12-12 PROCEDURE — G8417 CALC BMI ABV UP PARAM F/U: HCPCS | Performed by: STUDENT IN AN ORGANIZED HEALTH CARE EDUCATION/TRAINING PROGRAM

## 2018-12-12 PROCEDURE — G8427 DOCREV CUR MEDS BY ELIG CLIN: HCPCS | Performed by: STUDENT IN AN ORGANIZED HEALTH CARE EDUCATION/TRAINING PROGRAM

## 2018-12-12 PROCEDURE — G8484 FLU IMMUNIZE NO ADMIN: HCPCS | Performed by: STUDENT IN AN ORGANIZED HEALTH CARE EDUCATION/TRAINING PROGRAM

## 2018-12-12 PROCEDURE — 1036F TOBACCO NON-USER: CPT | Performed by: STUDENT IN AN ORGANIZED HEALTH CARE EDUCATION/TRAINING PROGRAM

## 2018-12-12 PROCEDURE — 99213 OFFICE O/P EST LOW 20 MIN: CPT | Performed by: STUDENT IN AN ORGANIZED HEALTH CARE EDUCATION/TRAINING PROGRAM

## 2018-12-12 RX ORDER — WHEELCHAIR
1 EACH MISCELLANEOUS DAILY
Qty: 1 EACH | Refills: 0 | Status: SHIPPED | OUTPATIENT
Start: 2018-12-12 | End: 2018-12-19 | Stop reason: SDUPTHER

## 2018-12-12 NOTE — PROGRESS NOTES
for Pain 12/3/18   Ankit Delgadillo MD   HYDROcodone-acetaminophen (NORCO) 5-325 MG per tablet Take 1 tablet by mouth every 6 hours as needed for Pain. Eugenia Brandon Historical MD [place]   docusate sodium (COLACE, DULCOLAX) 100 MG CAPS Take 100 mg by mouth daily as needed for Constipation 9/28/18   Zoraida Silva DPM   Misc. Devices (FREE SPIRIT KNEE/LEG WALKER) MISC Please use walker to mobilize until cleared by podiatry to bear weight on foot. 8/10/18   Eric Watts MD       Objective     Vitals:    12/12/18 1327   BP: 134/80   Pulse: 85   Resp: 20     No results found for: LABA1C    Physical Exam:  General:  Alert and oriented x3. In no acute distress. Lower Extremity Physical Exam:    Vascular: DP and PT pulses are palpable, Bilateral. CFT <3 seconds to all digits, Bilateral.  Global non-pitting edema to right foot. Hair growth is present to the level of the digits, Bilateral. Decreased temperature to left lower extremity compared to right. Neuro: Saph/sural/SP/DP/plantar sensation intact to light touch. Musculoskeletal: EHL/FHL/GS/TA gross motor intact. Tenderness to palpation to base iof right 5th metatarsal, and just inferior to the medial malleolus. Gross deformity is absent, Bilateral. POP along surgical incision and peroneal tendon improved since last visit. Dermatologic: Surgical dehiscence noted to the dorsal lateral fifth metatarsal base healed. Surgical cicatrix noted. Discoloration noted to the right lower extremity compared to left. Xr Foot Right (min 3 Views)  Result Date: 10/24/2018  EXAMINATION: 3 XRAY VIEWS OF THE RIGHT FOOT 10/24/2018 4:24 pm COMPARISON: September 24, 2018 HISTORY: ORDERING SYSTEM PROVIDED HISTORY: Post-operative state FINDINGS: Mild diffuse soft tissue swelling. Interval development of significant demineralization. No acute fracture. Hardware along the fifth metatarsal base appears intact. Fracture line is not visualized.      Interval development of significant osteopenia. Postoperative fifth metatarsal internal fixation. Fracture lines are not visualized suggesting healing. Ct Foot Right Wo Contrast  Result Date: 10/30/2018  EXAMINATION: CT OF THE RIGHT FOOT WITHOUT CONTRAST 10/30/2018 5:35 pm TECHNIQUE: CT of the right foot was performed without the administration of intravenous contrast.  Multiplanar reformatted images are provided for review. Dose modulation, iterative reconstruction, and/or weight based adjustment of the mA/kV was utilized to reduce the radiation dose to as low as reasonably achievable. COMPARISON: None. HISTORY ORDERING SYSTEM PROVIDED HISTORY: Displaced fracture of fifth metatarsal bone, right foot, initial encounter for closed fracture TECHNOLOGIST PROVIDED HISTORY: evaluate fracture healing, osteopenia vs crps? FINDINGS: Bones: Status post plate and screw fixation of the proximal 5th metatarsal. There is no evidence for hardware complication or loosening. No definite additional fractures are identified. There is a diffuse pattern of tiny cyst-like osteopenia with a juxta articular predominance. Soft Tissue: There is mild subcutaneous soft tissue edema along the dorsum of the foot. No drainable fluid collection identified. Visualized course of the ankle tendons appear intact. Joint:  No abnormal joint effusion identified. Joint spaces are maintained. Subtle osteophyte formation involving the anterior tibia. Os trigonum variant identified. Postsurgical changes of the 5th metatarsal.  No evidence for hardware complication. Diffuse osteopenia with periarticular predominance. Differential considerations include complex regional pain syndrome and/or disuse osteopenia. Subcutaneous soft tissue edema along the dorsum of the foot without drainable fluid collection. Marianela Maldonado is a 34 y.o. male with     Diagnosis Orders   1. Complex regional pain syndrome type 1 of right lower extremity  Misc.  Devices

## 2018-12-19 RX ORDER — WHEELCHAIR
1 EACH MISCELLANEOUS DAILY
Qty: 1 EACH | Refills: 0 | Status: SHIPPED | OUTPATIENT
Start: 2018-12-19

## 2018-12-20 ENCOUNTER — TELEPHONE (OUTPATIENT)
Dept: ADMINISTRATIVE | Age: 28
End: 2018-12-20

## 2018-12-20 NOTE — TELEPHONE ENCOUNTER
Calling regarding status of orders for shower bench and wheel chair. In order for the insurance co to approve a new wheel chair, patient was required to turn in his old chair. He currently does not have a wheel chair. Also, by not having a shower bench, his still getting sponge baths. Please contact Gabriela Boogie at [phone].

## 2019-01-03 ENCOUNTER — HOSPITAL ENCOUNTER (OUTPATIENT)
Dept: PAIN MANAGEMENT | Age: 29
Discharge: HOME | End: 2019-01-03
Payer: MEDICAID

## 2019-01-03 VITALS
HEART RATE: 79 BPM | RESPIRATION RATE: 16 BRPM | TEMPERATURE: 98.5 F | DIASTOLIC BLOOD PRESSURE: 79 MMHG | SYSTOLIC BLOOD PRESSURE: 122 MMHG | OXYGEN SATURATION: 98 %

## 2019-01-03 DIAGNOSIS — M79.671 CHRONIC FOOT PAIN, RIGHT: Primary | ICD-10-CM

## 2019-01-03 DIAGNOSIS — M54.9 CHRONIC BACK PAIN, UNSPECIFIED BACK LOCATION, UNSPECIFIED BACK PAIN LATERALITY: ICD-10-CM

## 2019-01-03 DIAGNOSIS — G89.29 CHRONIC BACK PAIN, UNSPECIFIED BACK LOCATION, UNSPECIFIED BACK PAIN LATERALITY: ICD-10-CM

## 2019-01-03 DIAGNOSIS — G89.29 CHRONIC FOOT PAIN, RIGHT: Primary | ICD-10-CM

## 2019-01-03 DIAGNOSIS — G90.521 COMPLEX REGIONAL PAIN SYNDROME TYPE 1 OF RIGHT LOWER EXTREMITY: ICD-10-CM

## 2019-01-03 PROCEDURE — 64520 N BLOCK LUMBAR/THORACIC: CPT

## 2019-01-03 PROCEDURE — 6360000002 HC RX W HCPCS

## 2019-01-03 PROCEDURE — 6360000004 HC RX CONTRAST MEDICATION

## 2019-01-03 PROCEDURE — 6360000002 HC RX W HCPCS: Performed by: ANESTHESIOLOGY

## 2019-01-03 PROCEDURE — 64520 N BLOCK LUMBAR/THORACIC: CPT | Performed by: ANESTHESIOLOGY

## 2019-01-03 PROCEDURE — 77003 FLUOROGUIDE FOR SPINE INJECT: CPT | Performed by: ANESTHESIOLOGY

## 2019-01-03 PROCEDURE — 2500000003 HC RX 250 WO HCPCS

## 2019-01-03 RX ORDER — FENTANYL CITRATE 50 UG/ML
INJECTION, SOLUTION INTRAMUSCULAR; INTRAVENOUS
Status: COMPLETED | OUTPATIENT
Start: 2019-01-03 | End: 2019-01-03

## 2019-01-03 RX ORDER — MIDAZOLAM HYDROCHLORIDE 1 MG/ML
INJECTION INTRAMUSCULAR; INTRAVENOUS
Status: COMPLETED | OUTPATIENT
Start: 2019-01-03 | End: 2019-01-03

## 2019-01-03 RX ORDER — TOPIRAMATE 50 MG/1
50 TABLET, FILM COATED ORAL 2 TIMES DAILY
Qty: 30 TABLET | Refills: 0 | Status: SHIPPED | OUTPATIENT
Start: 2019-01-03 | End: 2019-01-30 | Stop reason: SDUPTHER

## 2019-01-03 RX ADMIN — MIDAZOLAM HYDROCHLORIDE 2 MG: 1 INJECTION, SOLUTION INTRAMUSCULAR; INTRAVENOUS at 09:32

## 2019-01-03 RX ADMIN — FENTANYL CITRATE 100 MCG: 50 INJECTION INTRAMUSCULAR; INTRAVENOUS at 09:32

## 2019-01-03 ASSESSMENT — PAIN - FUNCTIONAL ASSESSMENT: PAIN_FUNCTIONAL_ASSESSMENT: 0-10

## 2019-01-09 ENCOUNTER — OFFICE VISIT (OUTPATIENT)
Dept: PODIATRY | Age: 29
End: 2019-01-09
Payer: MEDICAID

## 2019-01-09 VITALS
BODY MASS INDEX: 37.23 KG/M2 | SYSTOLIC BLOOD PRESSURE: 126 MMHG | DIASTOLIC BLOOD PRESSURE: 81 MMHG | HEART RATE: 103 BPM | HEIGHT: 74 IN

## 2019-01-09 DIAGNOSIS — Z98.890 S/P FOOT SURGERY, RIGHT: ICD-10-CM

## 2019-01-09 DIAGNOSIS — L98.8 MACERATION OF SKIN: Primary | ICD-10-CM

## 2019-01-09 DIAGNOSIS — Z98.890 POST-OPERATIVE STATE: ICD-10-CM

## 2019-01-09 DIAGNOSIS — M85.871 OSTEOPENIA OF RIGHT FOOT: ICD-10-CM

## 2019-01-09 DIAGNOSIS — G90.521 COMPLEX REGIONAL PAIN SYNDROME TYPE 1 OF RIGHT LOWER EXTREMITY: ICD-10-CM

## 2019-01-09 PROCEDURE — 1036F TOBACCO NON-USER: CPT | Performed by: STUDENT IN AN ORGANIZED HEALTH CARE EDUCATION/TRAINING PROGRAM

## 2019-01-09 PROCEDURE — 99212 OFFICE O/P EST SF 10 MIN: CPT

## 2019-01-09 PROCEDURE — G8484 FLU IMMUNIZE NO ADMIN: HCPCS | Performed by: STUDENT IN AN ORGANIZED HEALTH CARE EDUCATION/TRAINING PROGRAM

## 2019-01-09 PROCEDURE — G8427 DOCREV CUR MEDS BY ELIG CLIN: HCPCS | Performed by: STUDENT IN AN ORGANIZED HEALTH CARE EDUCATION/TRAINING PROGRAM

## 2019-01-09 PROCEDURE — 99213 OFFICE O/P EST LOW 20 MIN: CPT | Performed by: STUDENT IN AN ORGANIZED HEALTH CARE EDUCATION/TRAINING PROGRAM

## 2019-01-09 PROCEDURE — G8417 CALC BMI ABV UP PARAM F/U: HCPCS | Performed by: STUDENT IN AN ORGANIZED HEALTH CARE EDUCATION/TRAINING PROGRAM

## 2019-01-15 ENCOUNTER — HOSPITAL ENCOUNTER (OUTPATIENT)
Age: 29
Setting detail: OBSERVATION
Discharge: HOME | End: 2019-01-16
Attending: INTERNAL MEDICINE | Admitting: INTERNAL MEDICINE
Payer: MEDICAID

## 2019-01-15 ENCOUNTER — HOSPITAL ENCOUNTER (OUTPATIENT)
Dept: PAIN MANAGEMENT | Age: 29
Discharge: HOME | End: 2019-01-15
Payer: MEDICAID

## 2019-01-15 VITALS
OXYGEN SATURATION: 96 % | DIASTOLIC BLOOD PRESSURE: 71 MMHG | TEMPERATURE: 97.6 F | RESPIRATION RATE: 14 BRPM | SYSTOLIC BLOOD PRESSURE: 121 MMHG | HEART RATE: 77 BPM | WEIGHT: 260 LBS | BODY MASS INDEX: 33.37 KG/M2 | HEIGHT: 74 IN

## 2019-01-15 DIAGNOSIS — G89.29 CHRONIC FOOT PAIN, RIGHT: ICD-10-CM

## 2019-01-15 DIAGNOSIS — G89.29 CHRONIC BACK PAIN, UNSPECIFIED BACK LOCATION, UNSPECIFIED BACK PAIN LATERALITY: ICD-10-CM

## 2019-01-15 DIAGNOSIS — M79.671 CHRONIC FOOT PAIN, RIGHT: ICD-10-CM

## 2019-01-15 DIAGNOSIS — G90.521 COMPLEX REGIONAL PAIN SYNDROME TYPE 1 OF RIGHT LOWER EXTREMITY: ICD-10-CM

## 2019-01-15 DIAGNOSIS — M54.9 CHRONIC BACK PAIN, UNSPECIFIED BACK LOCATION, UNSPECIFIED BACK PAIN LATERALITY: ICD-10-CM

## 2019-01-15 PROCEDURE — 64520 N BLOCK LUMBAR/THORACIC: CPT

## 2019-01-15 PROCEDURE — 6360000002 HC RX W HCPCS: Performed by: ANESTHESIOLOGY

## 2019-01-15 PROCEDURE — G0379 DIRECT REFER HOSPITAL OBSERV: HCPCS

## 2019-01-15 PROCEDURE — 6370000000 HC RX 637 (ALT 250 FOR IP): Performed by: INTERNAL MEDICINE

## 2019-01-15 PROCEDURE — 77003 FLUOROGUIDE FOR SPINE INJECT: CPT

## 2019-01-15 PROCEDURE — 77003 FLUOROGUIDE FOR SPINE INJECT: CPT | Performed by: ANESTHESIOLOGY

## 2019-01-15 PROCEDURE — G0378 HOSPITAL OBSERVATION PER HR: HCPCS

## 2019-01-15 PROCEDURE — 64520 N BLOCK LUMBAR/THORACIC: CPT | Performed by: ANESTHESIOLOGY

## 2019-01-15 RX ORDER — SODIUM CHLORIDE 0.9 % (FLUSH) 0.9 %
10 SYRINGE (ML) INJECTION EVERY 12 HOURS SCHEDULED
Status: DISCONTINUED | OUTPATIENT
Start: 2019-01-15 | End: 2019-01-16 | Stop reason: HOSPADM

## 2019-01-15 RX ORDER — DULOXETIN HYDROCHLORIDE 30 MG/1
30 CAPSULE, DELAYED RELEASE ORAL DAILY
Status: DISCONTINUED | OUTPATIENT
Start: 2019-01-15 | End: 2019-01-16 | Stop reason: HOSPADM

## 2019-01-15 RX ORDER — PREGABALIN 75 MG/1
75 CAPSULE ORAL 3 TIMES DAILY
Status: DISCONTINUED | OUTPATIENT
Start: 2019-01-15 | End: 2019-01-16 | Stop reason: HOSPADM

## 2019-01-15 RX ORDER — BISACODYL 10 MG
10 SUPPOSITORY, RECTAL RECTAL DAILY PRN
Status: DISCONTINUED | OUTPATIENT
Start: 2019-01-15 | End: 2019-01-16 | Stop reason: HOSPADM

## 2019-01-15 RX ORDER — PREGABALIN 75 MG/1
75 CAPSULE ORAL 3 TIMES DAILY
Qty: 90 CAPSULE | Refills: 0 | Status: SHIPPED | OUTPATIENT
Start: 2019-01-15 | End: 2019-05-21 | Stop reason: SDUPTHER

## 2019-01-15 RX ORDER — SODIUM CHLORIDE 0.9 % (FLUSH) 0.9 %
10 SYRINGE (ML) INJECTION PRN
Status: DISCONTINUED | OUTPATIENT
Start: 2019-01-15 | End: 2019-01-16 | Stop reason: HOSPADM

## 2019-01-15 RX ORDER — POTASSIUM CHLORIDE 7.45 MG/ML
10 INJECTION INTRAVENOUS PRN
Status: DISCONTINUED | OUTPATIENT
Start: 2019-01-15 | End: 2019-01-16 | Stop reason: HOSPADM

## 2019-01-15 RX ORDER — ACETAMINOPHEN 325 MG/1
650 TABLET ORAL EVERY 4 HOURS PRN
Status: DISCONTINUED | OUTPATIENT
Start: 2019-01-15 | End: 2019-01-16 | Stop reason: HOSPADM

## 2019-01-15 RX ORDER — CELECOXIB 100 MG/1
100 CAPSULE ORAL 2 TIMES DAILY
Qty: 60 CAPSULE | Refills: 0 | Status: SHIPPED | OUTPATIENT
Start: 2019-01-15 | End: 2019-01-30 | Stop reason: SDUPTHER

## 2019-01-15 RX ORDER — ONDANSETRON 2 MG/ML
4 INJECTION INTRAMUSCULAR; INTRAVENOUS EVERY 6 HOURS PRN
Status: DISCONTINUED | OUTPATIENT
Start: 2019-01-15 | End: 2019-01-16 | Stop reason: HOSPADM

## 2019-01-15 RX ORDER — MAGNESIUM SULFATE 1 G/100ML
1 INJECTION INTRAVENOUS PRN
Status: DISCONTINUED | OUTPATIENT
Start: 2019-01-15 | End: 2019-01-16 | Stop reason: HOSPADM

## 2019-01-15 RX ORDER — POTASSIUM CHLORIDE 20MEQ/15ML
40 LIQUID (ML) ORAL PRN
Status: DISCONTINUED | OUTPATIENT
Start: 2019-01-15 | End: 2019-01-16 | Stop reason: HOSPADM

## 2019-01-15 RX ORDER — FENTANYL CITRATE 50 UG/ML
INJECTION, SOLUTION INTRAMUSCULAR; INTRAVENOUS
Status: COMPLETED | OUTPATIENT
Start: 2019-01-15 | End: 2019-01-15

## 2019-01-15 RX ORDER — DULOXETIN HYDROCHLORIDE 30 MG/1
30 CAPSULE, DELAYED RELEASE ORAL DAILY
Qty: 30 CAPSULE | Refills: 0 | Status: SHIPPED | OUTPATIENT
Start: 2019-01-15 | End: 2019-05-21 | Stop reason: SDUPTHER

## 2019-01-15 RX ORDER — MIDAZOLAM HYDROCHLORIDE 1 MG/ML
INJECTION INTRAMUSCULAR; INTRAVENOUS
Status: COMPLETED | OUTPATIENT
Start: 2019-01-15 | End: 2019-01-15

## 2019-01-15 RX ORDER — ACETAMINOPHEN 325 MG/1
325 TABLET ORAL EVERY 6 HOURS PRN
Status: DISCONTINUED | OUTPATIENT
Start: 2019-01-15 | End: 2019-01-16 | Stop reason: HOSPADM

## 2019-01-15 RX ORDER — POTASSIUM CHLORIDE 20 MEQ/1
40 TABLET, EXTENDED RELEASE ORAL PRN
Status: DISCONTINUED | OUTPATIENT
Start: 2019-01-15 | End: 2019-01-16 | Stop reason: HOSPADM

## 2019-01-15 RX ORDER — CELECOXIB 100 MG/1
100 CAPSULE ORAL 2 TIMES DAILY
Status: DISCONTINUED | OUTPATIENT
Start: 2019-01-15 | End: 2019-01-16 | Stop reason: HOSPADM

## 2019-01-15 RX ORDER — TOPIRAMATE 50 MG/1
50 TABLET, FILM COATED ORAL 2 TIMES DAILY
Status: DISCONTINUED | OUTPATIENT
Start: 2019-01-15 | End: 2019-01-16 | Stop reason: HOSPADM

## 2019-01-15 RX ADMIN — FENTANYL CITRATE 50 MCG: 50 INJECTION INTRAMUSCULAR; INTRAVENOUS at 08:52

## 2019-01-15 RX ADMIN — DULOXETINE HYDROCHLORIDE 30 MG: 30 CAPSULE, DELAYED RELEASE ORAL at 17:38

## 2019-01-15 RX ADMIN — TOPIRAMATE 50 MG: 50 TABLET, FILM COATED ORAL at 20:55

## 2019-01-15 RX ADMIN — PREGABALIN 75 MG: 75 CAPSULE ORAL at 17:44

## 2019-01-15 RX ADMIN — FENTANYL CITRATE 50 MCG: 50 INJECTION INTRAMUSCULAR; INTRAVENOUS at 08:58

## 2019-01-15 RX ADMIN — MIDAZOLAM HYDROCHLORIDE 2 MG: 1 INJECTION, SOLUTION INTRAMUSCULAR; INTRAVENOUS at 08:52

## 2019-01-15 ASSESSMENT — PAIN - FUNCTIONAL ASSESSMENT
PAIN_FUNCTIONAL_ASSESSMENT: 0-10

## 2019-01-15 ASSESSMENT — ENCOUNTER SYMPTOMS
ALLERGIC/IMMUNOLOGIC NEGATIVE: 1
GASTROINTESTINAL NEGATIVE: 1

## 2019-01-15 ASSESSMENT — PAIN SCALES - GENERAL
PAINLEVEL_OUTOF10: 2
PAINLEVEL_OUTOF10: 10
PAINLEVEL_OUTOF10: 7

## 2019-01-15 ASSESSMENT — PAIN DESCRIPTION - LOCATION: LOCATION: LEG

## 2019-01-15 ASSESSMENT — PAIN DESCRIPTION - PAIN TYPE: TYPE: CHRONIC PAIN

## 2019-01-15 ASSESSMENT — PAIN DESCRIPTION - PROGRESSION: CLINICAL_PROGRESSION: GRADUALLY WORSENING

## 2019-01-15 ASSESSMENT — PAIN DESCRIPTION - ONSET: ONSET: ON-GOING

## 2019-01-15 ASSESSMENT — PAIN DESCRIPTION - FREQUENCY: FREQUENCY: CONTINUOUS

## 2019-01-15 ASSESSMENT — PAIN DESCRIPTION - ORIENTATION: ORIENTATION: RIGHT

## 2019-01-16 VITALS
HEIGHT: 74 IN | OXYGEN SATURATION: 98 % | RESPIRATION RATE: 16 BRPM | BODY MASS INDEX: 39.78 KG/M2 | HEART RATE: 74 BPM | SYSTOLIC BLOOD PRESSURE: 133 MMHG | DIASTOLIC BLOOD PRESSURE: 80 MMHG | WEIGHT: 310 LBS | TEMPERATURE: 98.1 F

## 2019-01-16 LAB
ANION GAP SERPL CALCULATED.3IONS-SCNC: 13 MMOL/L (ref 9–17)
BUN BLDV-MCNC: 9 MG/DL (ref 6–20)
BUN/CREAT BLD: NORMAL (ref 9–20)
CALCIUM SERPL-MCNC: 9.2 MG/DL (ref 8.6–10.4)
CHLORIDE BLD-SCNC: 107 MMOL/L (ref 98–107)
CO2: 20 MMOL/L (ref 20–31)
CREAT SERPL-MCNC: 0.74 MG/DL (ref 0.7–1.2)
GFR AFRICAN AMERICAN: >60 ML/MIN
GFR NON-AFRICAN AMERICAN: >60 ML/MIN
GFR SERPL CREATININE-BSD FRML MDRD: NORMAL ML/MIN/{1.73_M2}
GFR SERPL CREATININE-BSD FRML MDRD: NORMAL ML/MIN/{1.73_M2}
GLUCOSE BLD-MCNC: 94 MG/DL (ref 70–99)
POTASSIUM SERPL-SCNC: 3.9 MMOL/L (ref 3.7–5.3)
SODIUM BLD-SCNC: 140 MMOL/L (ref 135–144)

## 2019-01-16 PROCEDURE — 6370000000 HC RX 637 (ALT 250 FOR IP): Performed by: NURSE PRACTITIONER

## 2019-01-16 PROCEDURE — G0378 HOSPITAL OBSERVATION PER HR: HCPCS

## 2019-01-16 PROCEDURE — 2580000003 HC RX 258: Performed by: INTERNAL MEDICINE

## 2019-01-16 PROCEDURE — 36415 COLL VENOUS BLD VENIPUNCTURE: CPT

## 2019-01-16 PROCEDURE — 80048 BASIC METABOLIC PNL TOTAL CA: CPT

## 2019-01-16 PROCEDURE — 6370000000 HC RX 637 (ALT 250 FOR IP): Performed by: INTERNAL MEDICINE

## 2019-01-16 PROCEDURE — 97162 PT EVAL MOD COMPLEX 30 MIN: CPT

## 2019-01-16 PROCEDURE — 97166 OT EVAL MOD COMPLEX 45 MIN: CPT

## 2019-01-16 PROCEDURE — 6360000002 HC RX W HCPCS: Performed by: INTERNAL MEDICINE

## 2019-01-16 PROCEDURE — 97530 THERAPEUTIC ACTIVITIES: CPT

## 2019-01-16 PROCEDURE — 96372 THER/PROPH/DIAG INJ SC/IM: CPT

## 2019-01-16 PROCEDURE — 99219 PR INITIAL OBSERVATION CARE/DAY 50 MINUTES: CPT | Performed by: INTERNAL MEDICINE

## 2019-01-16 RX ORDER — CYCLOBENZAPRINE HCL 5 MG
5 TABLET ORAL ONCE
Status: COMPLETED | OUTPATIENT
Start: 2019-01-16 | End: 2019-01-16

## 2019-01-16 RX ORDER — CALCIUM CARBONATE 200(500)MG
500 TABLET,CHEWABLE ORAL 3 TIMES DAILY PRN
Status: DISCONTINUED | OUTPATIENT
Start: 2019-01-16 | End: 2019-01-16 | Stop reason: HOSPADM

## 2019-01-16 RX ADMIN — CELECOXIB 100 MG: 100 CAPSULE ORAL at 08:31

## 2019-01-16 RX ADMIN — ENOXAPARIN SODIUM 30 MG: 30 INJECTION SUBCUTANEOUS at 08:30

## 2019-01-16 RX ADMIN — PREGABALIN 75 MG: 75 CAPSULE ORAL at 08:31

## 2019-01-16 RX ADMIN — DULOXETINE HYDROCHLORIDE 30 MG: 30 CAPSULE, DELAYED RELEASE ORAL at 08:30

## 2019-01-16 RX ADMIN — CYCLOBENZAPRINE HYDROCHLORIDE 5 MG: 5 TABLET, FILM COATED ORAL at 02:09

## 2019-01-16 RX ADMIN — TOPIRAMATE 50 MG: 50 TABLET, FILM COATED ORAL at 08:43

## 2019-01-16 RX ADMIN — ANTACID TABLETS 500 MG: 500 TABLET, CHEWABLE ORAL at 02:09

## 2019-01-16 RX ADMIN — Medication 10 ML: at 08:31

## 2019-01-16 ASSESSMENT — PAIN DESCRIPTION - LOCATION
LOCATION: ANKLE
LOCATION: FOOT
LOCATION: FOOT

## 2019-01-16 ASSESSMENT — PAIN SCALES - GENERAL
PAINLEVEL_OUTOF10: 7
PAINLEVEL_OUTOF10: 8
PAINLEVEL_OUTOF10: 7

## 2019-01-16 ASSESSMENT — PAIN DESCRIPTION - PAIN TYPE
TYPE: ACUTE PAIN

## 2019-01-16 ASSESSMENT — PAIN DESCRIPTION - ORIENTATION
ORIENTATION: RIGHT

## 2019-01-16 ASSESSMENT — PAIN DESCRIPTION - DESCRIPTORS: DESCRIPTORS: PINS AND NEEDLES;STABBING;THROBBING

## 2019-01-30 ENCOUNTER — HOSPITAL ENCOUNTER (OUTPATIENT)
Age: 29
Discharge: HOME | End: 2019-02-01
Payer: MEDICAID

## 2019-01-30 ENCOUNTER — OFFICE VISIT (OUTPATIENT)
Dept: PODIATRY | Age: 29
End: 2019-01-30
Payer: MEDICAID

## 2019-01-30 ENCOUNTER — HOSPITAL ENCOUNTER (OUTPATIENT)
Dept: PAIN MANAGEMENT | Age: 29
Discharge: HOME | End: 2019-01-30
Payer: MEDICAID

## 2019-01-30 ENCOUNTER — HOSPITAL ENCOUNTER (OUTPATIENT)
Dept: GENERAL RADIOLOGY | Age: 29
Discharge: HOME | End: 2019-02-01

## 2019-01-30 VITALS
WEIGHT: 270 LBS | HEIGHT: 74 IN | BODY MASS INDEX: 34.65 KG/M2 | HEART RATE: 97 BPM | SYSTOLIC BLOOD PRESSURE: 124 MMHG | DIASTOLIC BLOOD PRESSURE: 78 MMHG

## 2019-01-30 VITALS
RESPIRATION RATE: 16 BRPM | SYSTOLIC BLOOD PRESSURE: 142 MMHG | DIASTOLIC BLOOD PRESSURE: 68 MMHG | HEART RATE: 97 BPM | OXYGEN SATURATION: 98 % | TEMPERATURE: 95 F

## 2019-01-30 DIAGNOSIS — S92.351S CLOSED DISPLACED FRACTURE OF FIFTH METATARSAL BONE OF RIGHT FOOT, SEQUELA: ICD-10-CM

## 2019-01-30 DIAGNOSIS — M54.9 CHRONIC BACK PAIN, UNSPECIFIED BACK LOCATION, UNSPECIFIED BACK PAIN LATERALITY: ICD-10-CM

## 2019-01-30 DIAGNOSIS — G90.521 COMPLEX REGIONAL PAIN SYNDROME TYPE 1 OF RIGHT LOWER EXTREMITY: ICD-10-CM

## 2019-01-30 DIAGNOSIS — Z98.890 S/P FOOT SURGERY, RIGHT: ICD-10-CM

## 2019-01-30 DIAGNOSIS — G89.29 CHRONIC FOOT PAIN, RIGHT: ICD-10-CM

## 2019-01-30 DIAGNOSIS — Z98.890 POST-OPERATIVE STATE: ICD-10-CM

## 2019-01-30 DIAGNOSIS — M79.671 PAIN OF RIGHT FOOT: ICD-10-CM

## 2019-01-30 DIAGNOSIS — G89.29 CHRONIC BACK PAIN, UNSPECIFIED BACK LOCATION, UNSPECIFIED BACK PAIN LATERALITY: ICD-10-CM

## 2019-01-30 DIAGNOSIS — G90.521 COMPLEX REGIONAL PAIN SYNDROME TYPE 1 OF RIGHT LOWER EXTREMITY: Primary | ICD-10-CM

## 2019-01-30 DIAGNOSIS — M79.671 CHRONIC FOOT PAIN, RIGHT: ICD-10-CM

## 2019-01-30 PROCEDURE — 64520 N BLOCK LUMBAR/THORACIC: CPT

## 2019-01-30 PROCEDURE — 73630 X-RAY EXAM OF FOOT: CPT

## 2019-01-30 PROCEDURE — 6360000002 HC RX W HCPCS: Performed by: ANESTHESIOLOGY

## 2019-01-30 PROCEDURE — 64520 N BLOCK LUMBAR/THORACIC: CPT | Performed by: ANESTHESIOLOGY

## 2019-01-30 PROCEDURE — 99213 OFFICE O/P EST LOW 20 MIN: CPT | Performed by: STUDENT IN AN ORGANIZED HEALTH CARE EDUCATION/TRAINING PROGRAM

## 2019-01-30 PROCEDURE — 77003 FLUOROGUIDE FOR SPINE INJECT: CPT

## 2019-01-30 PROCEDURE — 77003 FLUOROGUIDE FOR SPINE INJECT: CPT | Performed by: ANESTHESIOLOGY

## 2019-01-30 PROCEDURE — 99212 OFFICE O/P EST SF 10 MIN: CPT | Performed by: STUDENT IN AN ORGANIZED HEALTH CARE EDUCATION/TRAINING PROGRAM

## 2019-01-30 RX ORDER — HYDROCODONE BITARTRATE AND ACETAMINOPHEN 5; 325 MG/1; MG/1
1 TABLET ORAL 2 TIMES DAILY PRN
Qty: 28 TABLET | Refills: 0 | Status: SHIPPED | OUTPATIENT
Start: 2019-01-30 | End: 2019-02-13

## 2019-01-30 RX ORDER — TOPIRAMATE 50 MG/1
50 TABLET, FILM COATED ORAL 2 TIMES DAILY
Qty: 60 TABLET | Refills: 0 | Status: SHIPPED | OUTPATIENT
Start: 2019-02-02 | End: 2019-05-21 | Stop reason: ALTCHOICE

## 2019-01-30 RX ORDER — CELECOXIB 200 MG/1
200 CAPSULE ORAL DAILY
Qty: 30 CAPSULE | Refills: 0 | Status: SHIPPED | OUTPATIENT
Start: 2019-01-30 | End: 2019-05-21 | Stop reason: SDUPTHER

## 2019-01-30 RX ORDER — HYDROCODONE BITARTRATE AND ACETAMINOPHEN 5; 325 MG/1; MG/1
1 TABLET ORAL DAILY PRN
COMMUNITY
End: 2019-01-30 | Stop reason: SDUPTHER

## 2019-01-30 RX ORDER — MIDAZOLAM HYDROCHLORIDE 1 MG/ML
INJECTION INTRAMUSCULAR; INTRAVENOUS
Status: COMPLETED | OUTPATIENT
Start: 2019-01-30 | End: 2019-01-30

## 2019-01-30 RX ORDER — FENTANYL CITRATE 50 UG/ML
INJECTION, SOLUTION INTRAMUSCULAR; INTRAVENOUS
Status: COMPLETED | OUTPATIENT
Start: 2019-01-30 | End: 2019-01-30

## 2019-01-30 RX ORDER — CELECOXIB 100 MG/1
200 CAPSULE ORAL DAILY
Qty: 60 CAPSULE | Refills: 0 | Status: SHIPPED | OUTPATIENT
Start: 2019-01-30 | End: 2019-01-30 | Stop reason: SDUPTHER

## 2019-01-30 RX ORDER — HYDROCODONE BITARTRATE AND ACETAMINOPHEN 5; 325 MG/1; MG/1
1 TABLET ORAL DAILY PRN
Qty: 30 TABLET | Refills: 0 | Status: SHIPPED | OUTPATIENT
Start: 2019-01-30 | End: 2019-01-30 | Stop reason: SDUPTHER

## 2019-01-30 RX ADMIN — MIDAZOLAM HYDROCHLORIDE 2 MG: 1 INJECTION, SOLUTION INTRAMUSCULAR; INTRAVENOUS at 09:19

## 2019-01-30 RX ADMIN — FENTANYL CITRATE 50 MCG: 50 INJECTION INTRAMUSCULAR; INTRAVENOUS at 09:18

## 2019-01-30 ASSESSMENT — PAIN DESCRIPTION - DESCRIPTORS: DESCRIPTORS: PINS AND NEEDLES;STABBING;THROBBING

## 2019-01-30 ASSESSMENT — PAIN - FUNCTIONAL ASSESSMENT
PAIN_FUNCTIONAL_ASSESSMENT: 0-10
PAIN_FUNCTIONAL_ASSESSMENT: 0-10

## 2019-01-30 ASSESSMENT — PAIN SCALES - GENERAL: PAINLEVEL_OUTOF10: 0

## 2019-02-20 ENCOUNTER — HOSPITAL ENCOUNTER (OUTPATIENT)
Dept: PAIN MANAGEMENT | Age: 29
Discharge: HOME | End: 2019-02-20
Payer: MEDICAID

## 2019-02-20 VITALS
SYSTOLIC BLOOD PRESSURE: 158 MMHG | HEART RATE: 73 BPM | WEIGHT: 270 LBS | TEMPERATURE: 99.1 F | RESPIRATION RATE: 16 BRPM | DIASTOLIC BLOOD PRESSURE: 95 MMHG | HEIGHT: 74 IN | BODY MASS INDEX: 34.65 KG/M2

## 2019-02-20 DIAGNOSIS — G89.29 CHRONIC FOOT PAIN, RIGHT: Primary | ICD-10-CM

## 2019-02-20 DIAGNOSIS — Z22.322 MRSA CARRIER: ICD-10-CM

## 2019-02-20 DIAGNOSIS — M79.671 CHRONIC FOOT PAIN, RIGHT: Primary | ICD-10-CM

## 2019-02-20 DIAGNOSIS — G90.521 COMPLEX REGIONAL PAIN SYNDROME TYPE 1 OF RIGHT LOWER EXTREMITY: ICD-10-CM

## 2019-02-20 PROCEDURE — 99214 OFFICE O/P EST MOD 30 MIN: CPT | Performed by: ANESTHESIOLOGY

## 2019-02-20 PROCEDURE — 99214 OFFICE O/P EST MOD 30 MIN: CPT

## 2019-02-20 PROCEDURE — G0463 HOSPITAL OUTPT CLINIC VISIT: HCPCS

## 2019-02-20 RX ORDER — HYDROCODONE BITARTRATE AND ACETAMINOPHEN 5; 325 MG/1; MG/1
1 TABLET ORAL 2 TIMES DAILY PRN
Status: ON HOLD | COMMUNITY
End: 2019-05-28 | Stop reason: HOSPADM

## 2019-02-20 RX ORDER — HYDROCODONE BITARTRATE AND ACETAMINOPHEN 5; 325 MG/1; MG/1
1 TABLET ORAL EVERY 8 HOURS PRN
Qty: 45 TABLET | Refills: 0 | Status: SHIPPED | OUTPATIENT
Start: 2019-02-20 | End: 2019-03-22

## 2019-02-20 ASSESSMENT — ENCOUNTER SYMPTOMS
APNEA: 0
BACK PAIN: 1
ABDOMINAL PAIN: 0

## 2019-02-20 ASSESSMENT — PAIN DESCRIPTION - ORIENTATION: ORIENTATION: RIGHT

## 2019-02-20 ASSESSMENT — PAIN DESCRIPTION - LOCATION: LOCATION: KNEE;FOOT

## 2019-02-20 ASSESSMENT — PAIN DESCRIPTION - DESCRIPTORS: DESCRIPTORS: CONSTANT;ACHING;THROBBING;STABBING

## 2019-02-20 ASSESSMENT — PAIN DESCRIPTION - PROGRESSION: CLINICAL_PROGRESSION: GRADUALLY WORSENING

## 2019-02-20 ASSESSMENT — PAIN DESCRIPTION - FREQUENCY: FREQUENCY: CONTINUOUS

## 2019-02-20 ASSESSMENT — PAIN - FUNCTIONAL ASSESSMENT: PAIN_FUNCTIONAL_ASSESSMENT: PREVENTS OR INTERFERES SOME ACTIVE ACTIVITIES AND ADLS

## 2019-02-20 ASSESSMENT — PAIN SCALES - GENERAL: PAINLEVEL_OUTOF10: 7

## 2019-02-20 ASSESSMENT — PAIN DESCRIPTION - PAIN TYPE: TYPE: CHRONIC PAIN

## 2019-02-20 ASSESSMENT — PAIN DESCRIPTION - ONSET: ONSET: ON-GOING

## 2019-02-27 ENCOUNTER — TELEPHONE (OUTPATIENT)
Dept: PAIN MANAGEMENT | Age: 29
End: 2019-02-27

## 2019-04-26 ENCOUNTER — TELEPHONE (OUTPATIENT)
Dept: PODIATRY | Age: 29
End: 2019-04-26

## 2019-04-26 NOTE — TELEPHONE ENCOUNTER
Writer called pt to reschedule appointment due to overbooked clinic, pt rescheduled to 5/22/19 at 1:45 pm.

## 2019-05-08 ENCOUNTER — OFFICE VISIT (OUTPATIENT)
Dept: PODIATRY | Age: 29
End: 2019-05-08
Payer: COMMERCIAL

## 2019-05-08 VITALS
DIASTOLIC BLOOD PRESSURE: 95 MMHG | SYSTOLIC BLOOD PRESSURE: 158 MMHG | HEART RATE: 92 BPM | WEIGHT: 270 LBS | BODY MASS INDEX: 34.65 KG/M2 | HEIGHT: 74 IN

## 2019-05-08 DIAGNOSIS — M79.671 PAIN OF RIGHT FOOT: ICD-10-CM

## 2019-05-08 DIAGNOSIS — G90.521 COMPLEX REGIONAL PAIN SYNDROME TYPE 1 OF RIGHT LOWER EXTREMITY: ICD-10-CM

## 2019-05-08 DIAGNOSIS — S92.324A NONDISPLACED FRACTURE OF SECOND METATARSAL BONE, RIGHT FOOT, INITIAL ENCOUNTER FOR CLOSED FRACTURE: Primary | ICD-10-CM

## 2019-05-08 PROCEDURE — 99212 OFFICE O/P EST SF 10 MIN: CPT

## 2019-05-08 PROCEDURE — 99213 OFFICE O/P EST LOW 20 MIN: CPT | Performed by: STUDENT IN AN ORGANIZED HEALTH CARE EDUCATION/TRAINING PROGRAM

## 2019-05-08 PROCEDURE — 99212 OFFICE O/P EST SF 10 MIN: CPT | Performed by: STUDENT IN AN ORGANIZED HEALTH CARE EDUCATION/TRAINING PROGRAM

## 2019-05-08 NOTE — PROGRESS NOTES
[address]  [address] [facility], [address]   Tel: [phone]   Fax: [phone]    Subjective     CC: S/p ORIF right 5th metatarsal fracture. CRPS    Interval History:  Patient reports he tripped and fell 2 days ago after tripping over his dog (DOI: 5/6/19). He went to [facility] and had a CT and XR done of right right foot revealing 2nd metatarsal fracture. He has not had the spinal cord stimulator yet. He reports walking with a cane prior to the injury. HPI:  Edward Cannon is a 34y.o. year old male who presents to clinic today for follow up to ORIF right 5th metatarsal. (DOS 8/29/18, Dr. Tamara Harper). Fracture has healed, but he has significant osteoporosis. He completed his course of IV and oral abx. He had his last lumbar sympathetic block today which he states relief only lasts about 1 day. He has had 3 injections. States he will likely need spinal cord stimulator per pain mgmt. On medications per pain management for CRPS. Patient has been WBAT in a CAM boot. Home PT comes twice a week and has been working on strengthening and ROM exercises. Patient states he needs assistance getting into the shower and his previous wheelchair is no longer working. Maceration has improved with normal skin to his heel. Denies N/F/V/C. Primary care physician is Kati Merrill MD.    ROS:    Constitutional: Reports occasional fevers and chills none currently. Denies nausea/vomiting. Neurologic: Reports altered sensation to right foot 2/2 CRPS. Vascular: Denies symptoms of lower extremity claudication. Skin: Denies open wounds. Otherwise negative except as noted in the HPI.      PMH:  Past Medical History:   Diagnosis Date    Acute osteomyelitis of left foot ([address]     Cellulitis 9/24/2018    Cellulitis and abscess of foot 9/24/2018    Closed displaced fracture of base of fourth metacarpal bone of right hand 7/23/2018    Foot fracture, right 08/2018    GERD (gastroesophageal reflux disease)     no longer on RX    Hand fracture, right 2018       Surgical History:   Past Surgical History:   Procedure Laterality Date    FINGER CLOSED REDUCTION  2018    CLOSED REDUCTION PERC PINNING SMALL AND FINGER FINGER METACARPAL BASE -    NERVE BLOCK Right 2019    right lumbar sympathetic- decadron 10 mg    NERVE BLOCK Right 01/15/2019    rt lumbar sympathetic block. no steroid. Naropen    NERVE BLOCK  2019    right lumbar sympathetic # 3,  isovue,decadron, naropin,isovue    OPEN TREATMENT METATARSAL FRACTURE EACH Right 2018    OPEN REDUCTION INTERNAL FIXATION 5TH METATARSAL, MEDIAL MALLEOLUS ORIF  (PARAGON 28 - JOYCE SLOAT) performed by Justin Holliday DPM at [address] [place] [address]  2018     OPEN REDUCTION INTERNAL FIXATION 5TH METATARSAL, MEDIAL MALLEOLUS ORIF  (PARAGON 28 - JOYCE SLOAT) (Right Foot)    OTHER SURGICAL HISTORY Right 2018    ORIF 5th metatarsal    [place] OFFICE/OUTPT VISIT,PROCEDURE ONLY Right 2018    CLOSED REDUCTION PERC PINNING SMALL AND FINGER FINGER METACARPAL BASE - C ARM, SYNTHES performed by Krisitn Rollins MD at [facility] EXTRACTION         Social History:  Social History     Tobacco Use    Smoking status: Former Smoker     Packs/day: 0.50     Years: 3.00     Pack years: 1.50     Types: Cigarettes     Last attempt to quit: 2018     Years since quittin.3    Smokeless tobacco: Never Used   Substance Use Topics    Alcohol use: Yes     Alcohol/week: 0.0 oz     Comment: social    Drug use: No       Medications:  Prior to Admission medications    Medication Sig Start Date End Date Taking? Authorizing Provider   HYDROcodone-acetaminophen (NORCO) 5-325 MG per tablet Take 1 tablet by mouth 2 times daily as needed for Pain. .   Yes Historical Provider, MD   Povidone-Iodine Scrub Sponge (BETADINE SWAB AID) 10 % SWAB Apply 1 Stick topically the dorsal lateral fifth metatarsal base healed. Surgical cicatrix noted. Edema is improved. Significant amount of drainage noted on sock and inside the CAM boot. XR and CT of right foot reviewed. Non-displaced 2nd metatarsal neck fracture of the right foot. Assessment   Chano Casper is a 34 y.o. male with     Diagnosis Orders   1. Nondisplaced fracture of second metatarsal bone, right foot, initial encounter for closed fracture  XR FOOT RIGHT (MIN 3 VIEWS)   2. Pain of right foot     3. Complex regional pain syndrome type 1 of right lower extremity          Plan   · Patient examined and evaluated    · Diagnosis and treatment options discussed in detail  · Pt to off-load heel when resting  · XR of right foot ordered   · Physical therapy: Continue at home physical therapy as instructed  · Dispensed CAM boot   · Will have patient WB as tolerated to the heel with crutches/walker. · Recommend follow up with Dr. Iraj Veliz for further pain mgmt considering new fracture   · Pt to RTC in  2 weeks with new XR right foot   · Discussed with Dr. Angelique Barajas     No orders of the defined types were placed in this encounter.     Orders Placed This Encounter   Procedures    XR FOOT RIGHT (MIN 3 VIEWS)     Standing Status:   Future     Standing Expiration Date:   5/8/2020     Order Specific Question:   Reason for exam:     Answer:   2nd metatarsal fracture     Electronically signed by Priya Hinojosa DPM on 5/8/2019 at 1:56 PM

## 2019-05-21 ENCOUNTER — HOSPITAL ENCOUNTER (OUTPATIENT)
Dept: PAIN MANAGEMENT | Age: 29
Discharge: HOME | DRG: 241 | End: 2019-05-21
Payer: COMMERCIAL

## 2019-05-21 VITALS
SYSTOLIC BLOOD PRESSURE: 138 MMHG | WEIGHT: 270 LBS | DIASTOLIC BLOOD PRESSURE: 72 MMHG | OXYGEN SATURATION: 97 % | RESPIRATION RATE: 16 BRPM | HEIGHT: 74 IN | BODY MASS INDEX: 34.65 KG/M2

## 2019-05-21 DIAGNOSIS — G89.29 CHRONIC FOOT PAIN, RIGHT: Primary | ICD-10-CM

## 2019-05-21 DIAGNOSIS — G90.521 COMPLEX REGIONAL PAIN SYNDROME TYPE 1 OF RIGHT LOWER EXTREMITY: ICD-10-CM

## 2019-05-21 DIAGNOSIS — M79.671 CHRONIC FOOT PAIN, RIGHT: Primary | ICD-10-CM

## 2019-05-21 PROCEDURE — 99213 OFFICE O/P EST LOW 20 MIN: CPT

## 2019-05-21 PROCEDURE — 99213 OFFICE O/P EST LOW 20 MIN: CPT | Performed by: ANESTHESIOLOGY

## 2019-05-21 RX ORDER — DULOXETIN HYDROCHLORIDE 30 MG/1
30 CAPSULE, DELAYED RELEASE ORAL DAILY
Qty: 30 CAPSULE | Refills: 1 | Status: ON HOLD | OUTPATIENT
Start: 2019-05-21 | End: 2019-05-28 | Stop reason: HOSPADM

## 2019-05-21 RX ORDER — PREGABALIN 75 MG/1
75 CAPSULE ORAL 3 TIMES DAILY
Qty: 90 CAPSULE | Refills: 1 | Status: ON HOLD | OUTPATIENT
Start: 2019-05-21 | End: 2019-05-28 | Stop reason: HOSPADM

## 2019-05-21 RX ORDER — CELECOXIB 200 MG/1
200 CAPSULE ORAL DAILY
Qty: 30 CAPSULE | Refills: 1 | Status: ON HOLD | OUTPATIENT
Start: 2019-05-21 | End: 2019-05-28 | Stop reason: HOSPADM

## 2019-05-21 ASSESSMENT — ENCOUNTER SYMPTOMS
BACK PAIN: 1
RESPIRATORY NEGATIVE: 1

## 2019-05-21 NOTE — PROGRESS NOTES
Chief Complaint   Patient presents with    Foot Pain    Medication Refill      The patient is a 34 y. o. / male. Chief Complaint   Patient presents with    Foot Pain    Medication Refill        HPI   53-year-old man seen in our clinic before for history of chronic right foot pain. He is diagnosed with CRPS of right lower extremities  Onset of symptoms related to a metacarpal fracture in 2018. Patient was treated surgically. Postop course was complicated with cellulitis osteomyelitis and wound dehiscence  He was on long-term antibiotic therapy. Consequently developed chronic right foot pain  Diagnosed with CRPS    He was last seen 3 months back in our clinic  Patient had lumbar sympathetic block series in January 2018 with limited benefit  He was managed with combination of neuropathic pain medications including Lyrica and Cymbalta and Celebrex  He stated he ran out of medication a few weeks back and is not taking anything for pain    Unfortunately 2 weeks back he tripped at home over his dog and injured his foot again  He went to [facility] and was told that he had a metacarpal fracture without any dislocation. He was seen by orthopedics clinic there and was not advised for any surgery. He is being managed conservatively and is referred back to the clinic for pain management      Pain is located over her right foot describes the pain as constant aching burning sensation with intermittent sharp pain with foot movement  Rates the intensity as 9 out of 10    Medication Refill: Celebrex,Lyrica and Cymbalta     Pain score Today:  9  Adverse effects (Constipation / Nausea / Sedation / sexual Dysfunction / others) : no   Mood: good  Sleep pattern and quality: poor  Activity level: fair to poor     Pill count Today: No   Last dose taken: few weeks ago,   OARRS report reviewed today: yes  ER/Hospitalizations/PCP visit related to pain since last visit:  2 weeks ago.     Any legal problems e.g. DUI etc.:no   Satisfied with current management: Yes     Opioid Contract:none   Last Urine Dug screen dated: none     Past Medical History, Past Surgical History, Social History, Allergies and Medications reviewed and updated in EPIC as indicated        Past Medical History:   Diagnosis Date    Acute osteomyelitis of left foot ([address]     Cellulitis 9/24/2018    Cellulitis and abscess of foot 9/24/2018    Closed displaced fracture of base of fourth metacarpal bone of right hand 7/23/2018    Foot fracture, right 08/2018    GERD (gastroesophageal reflux disease)     no longer on RX    Hand fracture, right 08/2018      Past Surgical History:   Procedure Laterality Date    FINGER CLOSED REDUCTION  07/25/2018    CLOSED REDUCTION PERC PINNING SMALL AND FINGER FINGER METACARPAL BASE -    NERVE BLOCK Right 01/03/2019    right lumbar sympathetic- decadron 10 mg    NERVE BLOCK Right 01/15/2019    rt lumbar sympathetic block. no steroid.  Naropen    NERVE BLOCK  01/30/2019    right lumbar sympathetic # 3,  isovue,decadron, naropin,isovue    OPEN TREATMENT METATARSAL FRACTURE EACH Right 8/29/2018    OPEN REDUCTION INTERNAL FIXATION 5TH METATARSAL, MEDIAL MALLEOLUS ORIF  (PARAGON 28 - JOYCE SLOAT) performed by Pedro Zhang DPM at [address] [place] [address]  08/29/2018     OPEN REDUCTION INTERNAL FIXATION 5TH METATARSAL, MEDIAL MALLEOLUS ORIF  (PARAGON 28 - JOYCE SLOAT) (Right Foot)    OTHER SURGICAL HISTORY Right 08/29/2018    ORIF 5th metatarsal    [place] OFFICE/OUTPT VISIT,PROCEDURE ONLY Right 7/25/2018    CLOSED REDUCTION PERC PINNING SMALL AND FINGER FINGER METACARPAL BASE - C ARM, SYNTHES performed by Román Fleming MD at [facility]. [place] [address] ENDOSCOPY      WISDOM TOOTH EXTRACTION       Social History     Socioeconomic History    Marital status: Single     Spouse name: None    Number of children: None    Years of education: None    Highest education level: None Occupational History    None   Social Needs    Financial resource strain: None    Food insecurity:     Worry: None     Inability: None    Transportation needs:     Medical: None     Non-medical: None   Tobacco Use    Smoking status: Former Smoker     Packs/day: 0.50     Years: 3.00     Pack years: 1.50     Types: Cigarettes     Last attempt to quit: 2018     Years since quittin.3    Smokeless tobacco: Never Used   Substance and Sexual Activity    Alcohol use: Yes     Alcohol/week: 0.0 oz     Comment: social    Drug use: No    Sexual activity: Yes     Partners: Female   Lifestyle    Physical activity:     Days per week: None     Minutes per session: None    Stress: None   Relationships    Social connections:     Talks on phone: None     Gets together: None     Attends Spiritism service: None     Active member of club or organization: None     Attends meetings of clubs or organizations: None     Relationship status: None    Intimate partner violence:     Fear of current or ex partner: None     Emotionally abused: None     Physically abused: None     Forced sexual activity: None   Other Topics Concern    None   Social History Narrative    None     Family History   Problem Relation Age of Onset    Diabetes Mother     Hypertension Father     Mental Illness Father     Other Neg Hx         blood clots     No Known Allergies  Patient has no known allergies. There were no vitals filed for this visit. Current Outpatient Medications   Medication Sig Dispense Refill    HYDROcodone-acetaminophen (NORCO) 5-325 MG per tablet Take 1 tablet by mouth 2 times daily as needed for Pain. [facility] celecoxib (CELEBREX) 200 MG capsule Take 1 capsule by mouth daily 30 capsule 0    DULoxetine (CYMBALTA) 30 MG extended release capsule Take 1 capsule by mouth daily 30 capsule 0    pregabalin (LYRICA) 75 MG capsule Take 1 capsule by mouth 3 times daily for 30 days. . 90 capsule 0    Povidone-Iodine Scrub Sponge (BETADINE SWAB AID) 10 % SWAB Apply 1 Stick topically daily 20 Stick 1    Misc. Devices (TUB TRANSFER BOARD) MISC 1 Units by Does not apply route daily 1 each 0    Misc. Devices [facility]) MISC 1 Units by Does not apply route daily 1 each 0    acetaminophen (TYLENOL) 325 MG tablet Take 1 tablet by mouth every 6 hours as needed for Pain 30 tablet 0    Misc. Devices (FREE SPIRIT KNEE/LEG WALKER) MISC Please use walker to mobilize until cleared by podiatry to bear weight on foot. 1 each 0     No current facility-administered medications for this encounter. Review of Systems   Constitutional: Positive for activity change and appetite change. Negative for chills, diaphoresis, fatigue, fever and unexpected weight change. HENT: Negative. Respiratory: Negative. Musculoskeletal: Positive for arthralgias, back pain, joint swelling and myalgias. Negative for gait problem, neck pain and neck stiffness. Objective:  General Appearance:  Uncomfortable and in pain. Vital signs: (most recent): Blood pressure 138/72, resp. rate 16, height 6' 2\" (1.88 m), weight 270 lb (122.5 kg), SpO2 97 %. Vital signs are normal.  No fever. HEENT: Normal HEENT exam.    Lungs:  Normal effort and normal respiratory rate. Breath sounds clear to auscultation. He is not in respiratory distress. Heart: Normal rate. Regular rhythm. Chest: Symmetric chest wall expansion. No chest wall tenderness. Abdomen: Abdomen is soft and non-distended. Bowel sounds are normal.     Neurological: Patient is alert and oriented to person, place and time. Normal strength. Pupils:  Pupils are equal, round, and reactive to light. Pupils are equal.   Skin:  Warm and dry. No rash.       Assessment & Plan   - Chronic right foot pain consequent to right foot postoperative complication  Limited benefit from lumbar sympathetic block  Was on chronic medication management  Ran out of medication    - Recent injury to right foot again with reported fracture  Managing conservatively by orthopedics  Increased right foot pain    1. Chronic foot pain, right    2. Complex regional pain syndrome type 1 of right lower extremity        Controlled Substances Monitoring:     RX Monitoring 5/21/2019   Attestation The Prescription Monitoring Report for this patient was reviewed today. Chronic Pain Routine Monitoring -   Chronic Pain > 80 MEDD -     No orders of the defined types were placed in this encounter. Orders Placed This Encounter   Medications    DULoxetine (CYMBALTA) 30 MG extended release capsule     Sig: Take 1 capsule by mouth daily     Dispense:  30 capsule     Refill:  1    pregabalin (LYRICA) 75 MG capsule     Sig: Take 1 capsule by mouth 3 times daily for 30 days.      Dispense:  90 capsule     Refill:  1    celecoxib (CELEBREX) 200 MG capsule     Sig: Take 1 capsule by mouth daily     Dispense:  30 capsule     Refill:  1         Electronically signed by Estefanía Cook MA on 5/21/2019 at 1:36 PM

## 2019-05-24 ENCOUNTER — APPOINTMENT (OUTPATIENT)
Dept: CT IMAGING | Age: 29
DRG: 241 | End: 2019-05-24
Payer: COMMERCIAL

## 2019-05-24 ENCOUNTER — APPOINTMENT (OUTPATIENT)
Dept: GENERAL RADIOLOGY | Age: 29
DRG: 241 | End: 2019-05-24
Payer: COMMERCIAL

## 2019-05-24 ENCOUNTER — HOSPITAL ENCOUNTER (INPATIENT)
Age: 29
LOS: 4 days | Discharge: HOME | DRG: 241 | End: 2019-05-28
Attending: EMERGENCY MEDICINE | Admitting: INTERNAL MEDICINE
Payer: COMMERCIAL

## 2019-05-24 DIAGNOSIS — T50.8X5A ALLERGIC REACTION TO CONTRAST MATERIAL, INITIAL ENCOUNTER: ICD-10-CM

## 2019-05-24 DIAGNOSIS — R10.13 ABDOMINAL PAIN, EPIGASTRIC: Primary | ICD-10-CM

## 2019-05-24 DIAGNOSIS — R11.2 NON-INTRACTABLE VOMITING WITH NAUSEA, UNSPECIFIED VOMITING TYPE: ICD-10-CM

## 2019-05-24 DIAGNOSIS — G90.521 COMPLEX REGIONAL PAIN SYNDROME TYPE 1 OF RIGHT LOWER EXTREMITY: ICD-10-CM

## 2019-05-24 PROBLEM — R19.7 NAUSEA VOMITING AND DIARRHEA: Status: ACTIVE | Noted: 2019-05-24

## 2019-05-24 LAB
-: NORMAL
ABSOLUTE EOS #: 0.05 K/UL (ref 0–0.44)
ABSOLUTE IMMATURE GRANULOCYTE: 0.12 K/UL (ref 0–0.3)
ABSOLUTE LYMPH #: 1.9 K/UL (ref 1.1–3.7)
ABSOLUTE MONO #: 0.79 K/UL (ref 0.1–1.2)
ALBUMIN SERPL-MCNC: 5 G/DL (ref 3.5–5.2)
ALBUMIN/GLOBULIN RATIO: 1.3 (ref 1–2.5)
ALP BLD-CCNC: 108 U/L (ref 40–129)
ALT SERPL-CCNC: 30 U/L (ref 5–41)
AMORPHOUS: NORMAL
ANION GAP SERPL CALCULATED.3IONS-SCNC: 15 MMOL/L (ref 9–17)
AST SERPL-CCNC: 26 U/L
BACTERIA: NORMAL
BASOPHILS # BLD: 0 % (ref 0–2)
BASOPHILS ABSOLUTE: 0.07 K/UL (ref 0–0.2)
BILIRUB SERPL-MCNC: 0.94 MG/DL (ref 0.3–1.2)
BILIRUBIN URINE: NEGATIVE
BUN BLDV-MCNC: 8 MG/DL (ref 6–20)
BUN/CREAT BLD: ABNORMAL (ref 9–20)
CALCIUM SERPL-MCNC: 9.9 MG/DL (ref 8.6–10.4)
CASTS UA: NORMAL /LPF (ref 0–8)
CHLORIDE BLD-SCNC: 101 MMOL/L (ref 98–107)
CO2: 24 MMOL/L (ref 20–31)
COLOR: YELLOW
COMMENT UA: ABNORMAL
CREAT SERPL-MCNC: 0.9 MG/DL (ref 0.7–1.2)
CRYSTALS, UA: NORMAL /HPF
DIFFERENTIAL TYPE: ABNORMAL
EOSINOPHILS RELATIVE PERCENT: 0 % (ref 1–4)
EPITHELIAL CELLS UA: NORMAL /HPF (ref 0–5)
GFR AFRICAN AMERICAN: >60 ML/MIN
GFR NON-AFRICAN AMERICAN: >60 ML/MIN
GFR SERPL CREATININE-BSD FRML MDRD: ABNORMAL ML/MIN/{1.73_M2}
GFR SERPL CREATININE-BSD FRML MDRD: ABNORMAL ML/MIN/{1.73_M2}
GLUCOSE BLD-MCNC: 114 MG/DL (ref 70–99)
GLUCOSE URINE: NEGATIVE
HCT VFR BLD CALC: 49.2 % (ref 40.7–50.3)
HEMOGLOBIN: 16.9 G/DL (ref 13–17)
IMMATURE GRANULOCYTES: 1 %
KETONES, URINE: ABNORMAL
LEUKOCYTE ESTERASE, URINE: NEGATIVE
LIPASE: 13 U/L (ref 13–60)
LYMPHOCYTES # BLD: 11 % (ref 24–43)
MCH RBC QN AUTO: 29.7 PG (ref 25.2–33.5)
MCHC RBC AUTO-ENTMCNC: 34.3 G/DL (ref 28.4–34.8)
MCV RBC AUTO: 86.5 FL (ref 82.6–102.9)
MONOCYTES # BLD: 5 % (ref 3–12)
MUCUS: NORMAL
NITRITE, URINE: NEGATIVE
NRBC AUTOMATED: 0 PER 100 WBC
OTHER OBSERVATIONS UA: NORMAL
PDW BLD-RTO: 11.9 % (ref 11.8–14.4)
PH UA: 5 (ref 5–8)
PLATELET # BLD: 438 K/UL (ref 138–453)
PLATELET ESTIMATE: ABNORMAL
PMV BLD AUTO: 9.2 FL (ref 8.1–13.5)
POTASSIUM SERPL-SCNC: 3.4 MMOL/L (ref 3.7–5.3)
PROTEIN UA: ABNORMAL
RBC # BLD: 5.69 M/UL (ref 4.21–5.77)
RBC # BLD: ABNORMAL 10*6/UL
RBC UA: NORMAL /HPF (ref 0–4)
RENAL EPITHELIAL, UA: NORMAL /HPF
SEG NEUTROPHILS: 83 % (ref 36–65)
SEGMENTED NEUTROPHILS ABSOLUTE COUNT: 13.81 K/UL (ref 1.5–8.1)
SODIUM BLD-SCNC: 140 MMOL/L (ref 135–144)
SPECIFIC GRAVITY UA: 1.02 (ref 1–1.03)
TOTAL PROTEIN: 8.9 G/DL (ref 6.4–8.3)
TRICHOMONAS: NORMAL
TURBIDITY: ABNORMAL
URINE HGB: NEGATIVE
UROBILINOGEN, URINE: NORMAL
WBC # BLD: 16.7 K/UL (ref 3.5–11.3)
WBC # BLD: ABNORMAL 10*3/UL
WBC UA: NORMAL /HPF (ref 0–5)
YEAST: NORMAL

## 2019-05-24 PROCEDURE — 6360000002 HC RX W HCPCS: Performed by: EMERGENCY MEDICINE

## 2019-05-24 PROCEDURE — 74177 CT ABD & PELVIS W/CONTRAST: CPT

## 2019-05-24 PROCEDURE — 99285 EMERGENCY DEPT VISIT HI MDM: CPT

## 2019-05-24 PROCEDURE — 6360000004 HC RX CONTRAST MEDICATION: Performed by: EMERGENCY MEDICINE

## 2019-05-24 PROCEDURE — 96372 THER/PROPH/DIAG INJ SC/IM: CPT

## 2019-05-24 PROCEDURE — 1200000000 HC SEMI PRIVATE

## 2019-05-24 PROCEDURE — 71046 X-RAY EXAM CHEST 2 VIEWS: CPT

## 2019-05-24 PROCEDURE — 81001 URINALYSIS AUTO W/SCOPE: CPT

## 2019-05-24 PROCEDURE — 96375 TX/PRO/DX INJ NEW DRUG ADDON: CPT

## 2019-05-24 PROCEDURE — 83690 ASSAY OF LIPASE: CPT

## 2019-05-24 PROCEDURE — 80053 COMPREHEN METABOLIC PANEL: CPT

## 2019-05-24 PROCEDURE — 85025 COMPLETE CBC W/AUTO DIFF WBC: CPT

## 2019-05-24 PROCEDURE — 96374 THER/PROPH/DIAG INJ IV PUSH: CPT

## 2019-05-24 PROCEDURE — 6360000002 HC RX W HCPCS

## 2019-05-24 PROCEDURE — 2580000003 HC RX 258: Performed by: EMERGENCY MEDICINE

## 2019-05-24 RX ORDER — METHYLPREDNISOLONE SODIUM SUCCINATE 125 MG/2ML
125 INJECTION, POWDER, LYOPHILIZED, FOR SOLUTION INTRAMUSCULAR; INTRAVENOUS ONCE
Status: COMPLETED | OUTPATIENT
Start: 2019-05-24 | End: 2019-05-24

## 2019-05-24 RX ORDER — DICYCLOMINE HYDROCHLORIDE 10 MG/ML
20 INJECTION INTRAMUSCULAR ONCE
Status: COMPLETED | OUTPATIENT
Start: 2019-05-24 | End: 2019-05-24

## 2019-05-24 RX ORDER — KETOROLAC TROMETHAMINE 30 MG/ML
30 INJECTION, SOLUTION INTRAMUSCULAR; INTRAVENOUS ONCE
Status: COMPLETED | OUTPATIENT
Start: 2019-05-24 | End: 2019-05-24

## 2019-05-24 RX ORDER — 0.9 % SODIUM CHLORIDE 0.9 %
1000 INTRAVENOUS SOLUTION INTRAVENOUS ONCE
Status: COMPLETED | OUTPATIENT
Start: 2019-05-24 | End: 2019-05-24

## 2019-05-24 RX ORDER — DIPHENHYDRAMINE HYDROCHLORIDE 50 MG/ML
50 INJECTION INTRAMUSCULAR; INTRAVENOUS ONCE
Status: COMPLETED | OUTPATIENT
Start: 2019-05-24 | End: 2019-05-24

## 2019-05-24 RX ORDER — DIPHENHYDRAMINE HYDROCHLORIDE 50 MG/ML
INJECTION INTRAMUSCULAR; INTRAVENOUS
Status: COMPLETED
Start: 2019-05-24 | End: 2019-05-24

## 2019-05-24 RX ORDER — ONDANSETRON 2 MG/ML
4 INJECTION INTRAMUSCULAR; INTRAVENOUS ONCE
Status: COMPLETED | OUTPATIENT
Start: 2019-05-24 | End: 2019-05-24

## 2019-05-24 RX ORDER — PROMETHAZINE HYDROCHLORIDE 25 MG/ML
25 INJECTION, SOLUTION INTRAMUSCULAR; INTRAVENOUS ONCE
Status: COMPLETED | OUTPATIENT
Start: 2019-05-24 | End: 2019-05-24

## 2019-05-24 RX ADMIN — SODIUM CHLORIDE 1000 ML: 9 INJECTION, SOLUTION INTRAVENOUS at 19:09

## 2019-05-24 RX ADMIN — EPINEPHRINE 0.35 MG: 1 INJECTION INTRAMUSCULAR; INTRAVENOUS; SUBCUTANEOUS at 20:12

## 2019-05-24 RX ADMIN — KETOROLAC TROMETHAMINE 30 MG: 30 INJECTION, SOLUTION INTRAMUSCULAR; INTRAVENOUS at 19:09

## 2019-05-24 RX ADMIN — DIPHENHYDRAMINE HYDROCHLORIDE 50 MG: 50 INJECTION, SOLUTION INTRAMUSCULAR; INTRAVENOUS at 20:12

## 2019-05-24 RX ADMIN — DIPHENHYDRAMINE HYDROCHLORIDE 50 MG: 50 INJECTION INTRAMUSCULAR; INTRAVENOUS at 20:12

## 2019-05-24 RX ADMIN — IOHEXOL 75 ML: 350 INJECTION, SOLUTION INTRAVENOUS at 20:08

## 2019-05-24 RX ADMIN — ONDANSETRON 4 MG: 2 INJECTION INTRAMUSCULAR; INTRAVENOUS at 19:09

## 2019-05-24 RX ADMIN — METHYLPREDNISOLONE SODIUM SUCCINATE 125 MG: 125 INJECTION, POWDER, FOR SOLUTION INTRAMUSCULAR; INTRAVENOUS at 20:14

## 2019-05-24 RX ADMIN — PROMETHAZINE HYDROCHLORIDE 25 MG: 25 INJECTION INTRAMUSCULAR; INTRAVENOUS at 20:14

## 2019-05-24 RX ADMIN — EPINEPHRINE 0.35 MG: 1 INJECTION INTRAMUSCULAR; INTRAVENOUS; SUBCUTANEOUS at 21:20

## 2019-05-24 RX ADMIN — DICYCLOMINE HYDROCHLORIDE 20 MG: 10 INJECTION INTRAMUSCULAR at 22:55

## 2019-05-24 ASSESSMENT — ENCOUNTER SYMPTOMS
SORE THROAT: 0
ABDOMINAL PAIN: 1
RHINORRHEA: 0
VOMITING: 1
COLOR CHANGE: 0
NAUSEA: 1
EYE PAIN: 0
SHORTNESS OF BREATH: 0
COUGH: 1
DIARRHEA: 1

## 2019-05-24 ASSESSMENT — PAIN DESCRIPTION - LOCATION: LOCATION: ABDOMEN

## 2019-05-24 ASSESSMENT — PAIN SCALES - GENERAL
PAINLEVEL_OUTOF10: 7
PAINLEVEL_OUTOF10: 8

## 2019-05-24 NOTE — ED PROVIDER NOTES
[facility]  Emergency Department  Faculty Attestation     I performed a history and physical examination of the patient and discussed management with the resident. I reviewed the residents note and agree with the documented findings and plan of care. Any areas of disagreement are noted on the chart. I was personally present for the key portions of any procedures. I have documented in the chart those procedures where I was not present during the key portions. I have reviewed the emergency nurses triage note. I agree with the chief complaint, past medical history, past surgical history, allergies, medications, social and family history as documented unless otherwise noted below. For Physician Assistant/ Nurse Practitioner cases/documentation I have personally evaluated this patient and have completed at least one if not all key elements of the E/M (history, physical exam, and MDM). Additional findings are as noted. Primary Care Physician:  Nav Manriquez MD    Screenings:  [unfilled]    CHIEF COMPLAINT       Chief Complaint   Patient presents with    Abdominal Pain     pt c/o abdominal pain, diarrhea, vomiting and fever X 1 week. States pain worse over the past 2-3 days. States \"I feel like I have been hyperventilating a lot lately\"       RECENT VITALS:   Temp: 100.1 °F (37.8 °C),  Pulse: 99, Resp: 16, BP: (!) 145/97    LABS:  Labs Reviewed   CULTURE STOOL   C DIFF TOXIN B BY RT PCR   CBC WITH AUTO DIFFERENTIAL   COMPREHENSIVE METABOLIC PANEL   LIPASE   GIARDIA / [facility]       Radiology  No orders to display       Attending Physician Additional  Notes    Patient over the past 2 days with vomiting diarrhea, pain greater than the upper abdomen and crampy in nature. There is blood in the emesis and in the stools. He has shaking chills and subjective fevers that was not documented. He's been off antibiotics for several months.   He has prior EGD and questionable gastritis not presently on PPI. No travel history. No exposures to others with vomiting/diarrhea or uncooked foods. On exam he is uncomfortable tachycardic hypertensive low-grade fever. Abdomen is soft with epigastric tenderness without rebound or guarding. Negative Wright sign. Impression is gastroenteritis. Plan is IV fluids antiemetics stool studies and reassess. Anticipate discharge with early follow-up. Je Saini.  Samantha Huerta MD, [address] [employer] [facility] [facility]  Attending Emergency  Physician                Keshia Aguayo MD  05/24/19 [id number]

## 2019-05-25 PROBLEM — K92.2 GI BLEED: Status: ACTIVE | Noted: 2019-05-25

## 2019-05-25 LAB
ABSOLUTE EOS #: <0.03 K/UL (ref 0–0.44)
ABSOLUTE IMMATURE GRANULOCYTE: 0.08 K/UL (ref 0–0.3)
ABSOLUTE LYMPH #: 0.86 K/UL (ref 1.1–3.7)
ABSOLUTE MONO #: 0.15 K/UL (ref 0.1–1.2)
ANION GAP SERPL CALCULATED.3IONS-SCNC: 15 MMOL/L (ref 9–17)
BASOPHILS # BLD: 0 % (ref 0–2)
BASOPHILS ABSOLUTE: <0.03 K/UL (ref 0–0.2)
BUN BLDV-MCNC: 9 MG/DL (ref 6–20)
BUN/CREAT BLD: ABNORMAL (ref 9–20)
CALCIUM SERPL-MCNC: 9.4 MG/DL (ref 8.6–10.4)
CHLORIDE BLD-SCNC: 103 MMOL/L (ref 98–107)
CO2: 24 MMOL/L (ref 20–31)
CREAT SERPL-MCNC: 0.81 MG/DL (ref 0.7–1.2)
DIFFERENTIAL TYPE: ABNORMAL
EOSINOPHILS RELATIVE PERCENT: 0 % (ref 1–4)
FERRITIN: 137 UG/L (ref 30–400)
GFR AFRICAN AMERICAN: >60 ML/MIN
GFR NON-AFRICAN AMERICAN: >60 ML/MIN
GFR SERPL CREATININE-BSD FRML MDRD: ABNORMAL ML/MIN/{1.73_M2}
GFR SERPL CREATININE-BSD FRML MDRD: ABNORMAL ML/MIN/{1.73_M2}
GLUCOSE BLD-MCNC: 122 MG/DL (ref 70–99)
HCT VFR BLD CALC: 45.1 % (ref 40.7–50.3)
HEMOGLOBIN: 15.2 G/DL (ref 13–17)
IMMATURE GRANULOCYTES: 1 %
IRON SATURATION: 13 % (ref 20–55)
IRON: 37 UG/DL (ref 59–158)
LYMPHOCYTES # BLD: 9 % (ref 24–43)
MCH RBC QN AUTO: 29.7 PG (ref 25.2–33.5)
MCHC RBC AUTO-ENTMCNC: 33.7 G/DL (ref 28.4–34.8)
MCV RBC AUTO: 88.3 FL (ref 82.6–102.9)
MONOCYTES # BLD: 2 % (ref 3–12)
NRBC AUTOMATED: 0 PER 100 WBC
PDW BLD-RTO: 11.9 % (ref 11.8–14.4)
PLATELET # BLD: 403 K/UL (ref 138–453)
PLATELET ESTIMATE: ABNORMAL
PMV BLD AUTO: 9 FL (ref 8.1–13.5)
POTASSIUM SERPL-SCNC: 4 MMOL/L (ref 3.7–5.3)
RBC # BLD: 5.11 M/UL (ref 4.21–5.77)
RBC # BLD: ABNORMAL 10*6/UL
SEG NEUTROPHILS: 89 % (ref 36–65)
SEGMENTED NEUTROPHILS ABSOLUTE COUNT: 8.63 K/UL (ref 1.5–8.1)
SODIUM BLD-SCNC: 142 MMOL/L (ref 135–144)
TOTAL IRON BINDING CAPACITY: 285 UG/DL (ref 250–450)
TRANSFERRIN: 228 MG/DL (ref 200–360)
UNSATURATED IRON BINDING CAPACITY: 248 UG/DL (ref 112–347)
WBC # BLD: 9.7 K/UL (ref 3.5–11.3)
WBC # BLD: ABNORMAL 10*3/UL

## 2019-05-25 PROCEDURE — 1200000000 HC SEMI PRIVATE

## 2019-05-25 PROCEDURE — 85025 COMPLETE CBC W/AUTO DIFF WBC: CPT

## 2019-05-25 PROCEDURE — 82728 ASSAY OF FERRITIN: CPT

## 2019-05-25 PROCEDURE — 83540 ASSAY OF IRON: CPT

## 2019-05-25 PROCEDURE — C9113 INJ PANTOPRAZOLE SODIUM, VIA: HCPCS | Performed by: INTERNAL MEDICINE

## 2019-05-25 PROCEDURE — 99999 PR OFFICE/OUTPT VISIT,PROCEDURE ONLY: CPT | Performed by: INTERNAL MEDICINE

## 2019-05-25 PROCEDURE — 83550 IRON BINDING TEST: CPT

## 2019-05-25 PROCEDURE — 83630 LACTOFERRIN FECAL (QUAL): CPT

## 2019-05-25 PROCEDURE — 36415 COLL VENOUS BLD VENIPUNCTURE: CPT

## 2019-05-25 PROCEDURE — 6360000002 HC RX W HCPCS: Performed by: INTERNAL MEDICINE

## 2019-05-25 PROCEDURE — 2580000003 HC RX 258: Performed by: INTERNAL MEDICINE

## 2019-05-25 PROCEDURE — 80048 BASIC METABOLIC PNL TOTAL CA: CPT

## 2019-05-25 PROCEDURE — 6360000002 HC RX W HCPCS: Performed by: STUDENT IN AN ORGANIZED HEALTH CARE EDUCATION/TRAINING PROGRAM

## 2019-05-25 PROCEDURE — 87338 HPYLORI STOOL AG IA: CPT

## 2019-05-25 PROCEDURE — 99253 IP/OBS CNSLTJ NEW/EST LOW 45: CPT | Performed by: INTERNAL MEDICINE

## 2019-05-25 PROCEDURE — 84466 ASSAY OF TRANSFERRIN: CPT

## 2019-05-25 RX ORDER — ONDANSETRON 2 MG/ML
4 INJECTION INTRAMUSCULAR; INTRAVENOUS EVERY 6 HOURS PRN
Status: DISCONTINUED | OUTPATIENT
Start: 2019-05-25 | End: 2019-05-29 | Stop reason: HOSPADM

## 2019-05-25 RX ORDER — DICYCLOMINE HYDROCHLORIDE 10 MG/ML
20 INJECTION INTRAMUSCULAR ONCE
Status: COMPLETED | OUTPATIENT
Start: 2019-05-25 | End: 2019-05-25

## 2019-05-25 RX ORDER — ACETAMINOPHEN 325 MG/1
650 TABLET ORAL EVERY 4 HOURS PRN
Status: DISCONTINUED | OUTPATIENT
Start: 2019-05-25 | End: 2019-05-29 | Stop reason: HOSPADM

## 2019-05-25 RX ORDER — SODIUM CHLORIDE 9 MG/ML
INJECTION, SOLUTION INTRAVENOUS CONTINUOUS
Status: DISCONTINUED | OUTPATIENT
Start: 2019-05-25 | End: 2019-05-29 | Stop reason: HOSPADM

## 2019-05-25 RX ORDER — SODIUM CHLORIDE 0.9 % (FLUSH) 0.9 %
10 SYRINGE (ML) INJECTION PRN
Status: DISCONTINUED | OUTPATIENT
Start: 2019-05-25 | End: 2019-05-29 | Stop reason: HOSPADM

## 2019-05-25 RX ORDER — 0.9 % SODIUM CHLORIDE 0.9 %
10 VIAL (ML) INJECTION 2 TIMES DAILY
Status: DISCONTINUED | OUTPATIENT
Start: 2019-05-25 | End: 2019-05-29 | Stop reason: HOSPADM

## 2019-05-25 RX ORDER — SODIUM CHLORIDE 0.9 % (FLUSH) 0.9 %
10 SYRINGE (ML) INJECTION EVERY 12 HOURS SCHEDULED
Status: DISCONTINUED | OUTPATIENT
Start: 2019-05-25 | End: 2019-05-29 | Stop reason: HOSPADM

## 2019-05-25 RX ORDER — PANTOPRAZOLE SODIUM 40 MG/10ML
40 INJECTION, POWDER, LYOPHILIZED, FOR SOLUTION INTRAVENOUS 2 TIMES DAILY
Status: DISCONTINUED | OUTPATIENT
Start: 2019-05-25 | End: 2019-05-29 | Stop reason: HOSPADM

## 2019-05-25 RX ADMIN — Medication 10 ML: at 22:04

## 2019-05-25 RX ADMIN — PANTOPRAZOLE SODIUM 40 MG: 40 INJECTION, POWDER, FOR SOLUTION INTRAVENOUS at 09:41

## 2019-05-25 RX ADMIN — Medication 10 ML: at 01:15

## 2019-05-25 RX ADMIN — SODIUM CHLORIDE: 9 INJECTION, SOLUTION INTRAVENOUS at 01:41

## 2019-05-25 RX ADMIN — PANTOPRAZOLE SODIUM 40 MG: 40 INJECTION, POWDER, FOR SOLUTION INTRAVENOUS at 22:04

## 2019-05-25 RX ADMIN — SODIUM CHLORIDE: 9 INJECTION, SOLUTION INTRAVENOUS at 21:47

## 2019-05-25 RX ADMIN — SODIUM CHLORIDE: 9 INJECTION, SOLUTION INTRAVENOUS at 11:31

## 2019-05-25 RX ADMIN — Medication 10 ML: at 09:42

## 2019-05-25 RX ADMIN — PANTOPRAZOLE SODIUM 40 MG: 40 INJECTION, POWDER, FOR SOLUTION INTRAVENOUS at 01:41

## 2019-05-25 RX ADMIN — DICYCLOMINE HYDROCHLORIDE 20 MG: 10 INJECTION INTRAMUSCULAR at 06:24

## 2019-05-25 RX ADMIN — ONDANSETRON HYDROCHLORIDE 4 MG: 2 INJECTION, SOLUTION INTRAMUSCULAR; INTRAVENOUS at 22:04

## 2019-05-25 RX ADMIN — ONDANSETRON HYDROCHLORIDE 4 MG: 2 INJECTION, SOLUTION INTRAMUSCULAR; INTRAVENOUS at 11:31

## 2019-05-25 RX ADMIN — ONDANSETRON HYDROCHLORIDE 4 MG: 2 INJECTION, SOLUTION INTRAMUSCULAR; INTRAVENOUS at 01:41

## 2019-05-25 ASSESSMENT — ENCOUNTER SYMPTOMS
NAUSEA: 1
COUGH: 0
SHORTNESS OF BREATH: 0
VOICE CHANGE: 0
DIARRHEA: 1
BLOOD IN STOOL: 1
CHEST TIGHTNESS: 0
ABDOMINAL PAIN: 1
TROUBLE SWALLOWING: 0
WHEEZING: 0
VOMITING: 1

## 2019-05-25 ASSESSMENT — PAIN DESCRIPTION - LOCATION
LOCATION: ABDOMEN
LOCATION: ABDOMEN

## 2019-05-25 ASSESSMENT — PAIN SCALES - GENERAL
PAINLEVEL_OUTOF10: 0
PAINLEVEL_OUTOF10: 7

## 2019-05-25 NOTE — PROGRESS NOTES
Patient not tolerating liquid diet. Patient states he feels nauseous and feels like he needs to vomit. Will continue to monitor.

## 2019-05-25 NOTE — ED NOTES
Report given to Rob Huntley RN from [employer]. Report method by phone   The following was reviewed with receiving RN:   Current vital signs:  /77   Pulse 96   Temp 99.4 °F (37.4 °C) (Oral)   Resp 16   Ht 6' 2\" (1.88 m)   Wt 270 lb (122.5 kg)   SpO2 95%   BMI 34.67 kg/m²                MEWS Score: 1     Any medication or safety alerts were reviewed. Any pending diagnostics and notifications were also reviewed, as well as any safety concerns or issues, abnormal labs, abnormal imaging, and abnormal assessment findings. Questions were answered.           Vickie Ko RN  05/25/19 0028

## 2019-05-25 NOTE — H&P
[facility].    HISTORY AND PHYSICAL EXAMINATION            Date:   5/25/2019  Patient name:  Zana Fleming  Date of admission:  5/24/2019  6:28 PM  MRN:   [id number]  YOB: 1990    CHIEF COMPLAINT     Abdominal pain, diarrhea, vomiting    HISTORY OF PRESENT ILLNESS      The patient is a 34 y.o.  male who is admitted to the hospital for abdominal pain which is in  epigastric region, also has diffuse abdominal pain started 2 weeks ago associated with diarrhea 4-5 times a day, liquid consistency moderate amount. Patient states that it is dark brown to blackish in color. Also complaining of vomiting for almost a week 3-4 times a day and is also dark brown colored. Denies any chest pain, palpitation, dizziness or shortness of breath  Denies any history of fever but has been feeling hot. Denies any sick contact. Patient is on celecoxib for complex pain of right foot. He admits to take ibuprofen, Motrin on the top of celecoxib occasionally. No previous history of GI bleed  No history of inflammation bowel disease    No history of any joint pain, swelling or skin rash    Admits to use marijuana 3-4 times a week last use was 2 days ago    Denies any alcohol use    In ER patient had a CT scan with IV contrast.  Once a contrast was given patient experienced swelling of his throat difficulty breathing and difficulty talking. He received epinephrine 0.35 mg x2. He also received Solu-Medrol 125 mg IV along with Benadryl 50 mg IV. His symptoms resolved quickly.   On my evaluation his speech was normal and had no chest tightness or difficulty in breathing    PAST MEDICAL HISTORY       has a past medical history of Acute osteomyelitis of left foot ([address] Cellulitis, Cellulitis and abscess of foot, Closed displaced fracture of base of fourth metacarpal bone of right hand, Foot fracture, right, GERD (gastroesophageal reflux tobacco. He reports that he drinks alcohol. He reports that he does not use drugs. FAMILY HISTORY      family history includes Diabetes in his mother; Hypertension in his father; Mental Illness in his father. REVIEW OF SYSTEMS      Review of Systems   Constitutional: Positive for chills. Negative for fever. HENT: Negative for trouble swallowing and voice change. Eyes: Negative for visual disturbance. Respiratory: Negative for cough, chest tightness, shortness of breath and wheezing. Cardiovascular: Negative for chest pain, palpitations and leg swelling. Gastrointestinal: Positive for abdominal pain, blood in stool, diarrhea, nausea and vomiting. Genitourinary: Negative for dysuria and hematuria. Musculoskeletal: Positive for arthralgias. Negative for joint swelling. Skin: Negative for pallor and rash. Neurological: Negative for dizziness, syncope, speech difficulty, weakness and light-headedness. Hematological: Negative for adenopathy. Psychiatric/Behavioral: Negative for agitation and confusion. PHYSICAL EXAM      /77   Pulse 98   Temp 98.7 °F (37.1 °C) (Oral)   Resp 18   Ht 6' 2\" (1.88 m)   Wt 270 lb (122.5 kg)   SpO2 96%   BMI 34.67 kg/m²      Physical Exam   Constitutional: He is oriented to person, place, and time. No distress. HENT:   Mouth/Throat: Oropharynx is clear and moist.   Eyes: Conjunctivae are normal. No scleral icterus. Cardiovascular: Normal rate, regular rhythm and normal heart sounds. Exam reveals no friction rub. No murmur heard. Pulmonary/Chest: Effort normal and breath sounds normal. No respiratory distress. He has no wheezes. He exhibits no tenderness. Abdominal: Soft. Bowel sounds are normal. He exhibits no distension. There is tenderness (epigastric and genralized diffuse). Musculoskeletal: He exhibits no edema. Ankle support on right   Neurological: He is alert and oriented to person, place, and time.  He exhibits normal muscle tone.        DIAGNOSTICS      Laboratory Testing:  CBC:   Recent Labs     05/24/19 1904   WBC 16.7*   HGB 16.9        BMP:    Recent Labs     05/24/19 1904      K 3.4*      CO2 24   BUN 8   CREATININE 0.90   GLUCOSE 114*     S. Calcium:  Recent Labs     05/24/19 1904   CALCIUM 9.9     S. Ionized Calcium:No results for input(s): IONCA in the last 72 hours. S. Magnesium:No results for input(s): MG in the last 72 hours. S. Phosphorus:No results for input(s): PHOS in the last 72 hours. S. Glucose:No results for input(s): POCGLU in the last 72 hours. Glycosylated hemoglobin A1C: No results for input(s): LABA1C in the last 72 hours. INR: No results for input(s): INR in the last 72 hours. Hepatic functions:   Recent Labs     05/24/19 1904   ALKPHOS 108   ALT 30   AST 26   PROT 8.9*   BILITOT 0.94   LABALBU 5.0     Pancreatic functions:No results for input(s): LACTA, AMYLASE in the last 72 hours. S. Lactic Acid: No results for input(s): LACTA in the last 72 hours. Cardiac enzymes:No results for input(s): CKTOTAL, CKMB, CKMBINDEX, TROPONINI in the last 72 hours. BNP:No results for input(s): BNP in the last 72 hours. Lipid profile: No results for input(s): CHOL, TRIG, HDL, LDLCALC in the last 72 hours. Invalid input(s): LDL  Blood Gases: No results found for: PH, PCO2, PO2, HCO3, O2SAT  Thyroid functions: No results found for: TSH     Imaging/Diagonstics:  CT abdomen  Lower Chest: Normal aeration of the lung bases.  Heart is normal in size.  No  pericardial effusion. Organs: Liver, gallbladder, pancreas, spleen, bilateral adrenal glands are  unremarkable. Bilateral kidneys and ureters are unremarkable. GI/Bowel: Stomach, small and large bowel loops are grossly unremarkable.  The  appendix is normal.    Pelvis: Urinary bladder is unremarkable.    No lymphadenopathy.  No soft  tissue masses or abnormal fluid collections.     Peritoneum/Retroperitoneum: No free intraperitoneal fluid or air. No  abdominal aortic aneurysm.  No retroperitoneal lymphadenopathy. Bones/Soft Tissues: No lytic or blastic lesions in all visualized osseous  structures. Impression:     No acute findings. 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 *        PLAN      1. Patient gives a history of dark-colored vomitus and stool. This could be either secondary to Yas-German syndrome or gastritis. Patient also takes regular celecoxib along with ibuprofen and Motrin occasionally and hence at high risk of PUD although his hemoglobin is 16.9. BUN is not elevated. Will keep nothing by mouth for now. Protonix IV twice a day. Check hemoglobin in the morning. GI consultation  2. Avoid NSAIDs  3. Stool for occult blood, fecal lactoferrin, stool culture. 4. EPC for DVT prophylaxis    Bj Jimenez MD      Department of Internal Medicine  [facility]         5/25/2019, 1:23 AM      IM Attending    Pt seen and examined today   I have discussed the care of pt , including pertinent history and exam findings,  with the resident. I have reviewed the key elements of all parts of the encounter with the resident. I agree with the assessment, plan and orders as documented by the resident.     Electronically signed by Yoni Ramirez MD on 5/25/2019 at 11:58 PM

## 2019-05-25 NOTE — ED PROVIDER NOTES
[facility] ED  Emergency Department Encounter  EmergencyMedicine Resident     Pt Name:Mike Olguin  MRN: [id number]  [place] 1990  Date of evaluation: 5/24/19  PCP:  Raeanne Gaucher, MD    [address] [place] [address]       Chief Complaint   Patient presents with    Abdominal Pain     pt c/o abdominal pain, diarrhea, vomiting and fever X 1 week. States pain worse over the past 2-3 days. States \"I feel like I have been hyperventilating a lot lately\"       HISTORY OF PRESENT ILLNESS  (Location/Symptom, Timing/Onset, Context/Setting, Quality, Duration, Modifying Factors, Severity.)      Ranjana Knox is a 34 y.o. male who presents with complaints of nausea, vomiting, diarrhea, belly pain. States his symptoms have been ongoing for the past 2 or 3 days. Also going cough as well. Denies any recent travel, antibiotic use or changes in diet. Denies any history of similar symptoms. States that he's noticed a little bit of blood in his emesis as well as some blood in his stools well. States that he hasn't been able to keep anything down for the past 2 days. States that the pain is epigastric in nature. Nonradiating, constant. Rated a 6 out of 10 in severity. PAST MEDICAL / SURGICAL / SOCIAL / FAMILY HISTORY      has a past medical history of Acute osteomyelitis of left foot ([address] Cellulitis, Cellulitis and abscess of foot, Closed displaced fracture of base of fourth metacarpal bone of right hand, Foot fracture, right, GERD (gastroesophageal reflux disease), and Hand fracture, right.     has a past surgical history that includes [place] tooth extraction; Finger Closed Reduction (07/25/2018); pr office/outpt visit,procedure only (Right, 7/25/2018); Upper gastrointestinal endoscopy; other surgical history (08/29/2018); other surgical history (Right, 08/29/2018); open treatment metatarsal fracture each (Right, 8/29/2018); Nerve Block (Right, 01/03/2019);  Nerve Block (Right, 01/15/2019); and Nerve Block (2019). Social History     Socioeconomic History    Marital status: Single     Spouse name: Not on file    Number of children: Not on file    Years of education: Not on file    Highest education level: Not on file   Occupational History    Not on file   Social Needs    Financial resource strain: Not on file    Food insecurity:     Worry: Not on file     Inability: Not on file    Transportation needs:     Medical: Not on file     Non-medical: Not on file   Tobacco Use    Smoking status: Former Smoker     Packs/day: 0.50     Years: 3.00     Pack years: 1.50     Types: Cigarettes     Last attempt to quit: 2018     Years since quittin.3    Smokeless tobacco: Never Used   Substance and Sexual Activity    Alcohol use: Yes     Alcohol/week: 0.0 oz     Comment: social    Drug use: No    Sexual activity: Yes     Partners: Female   Lifestyle    Physical activity:     Days per week: Not on file     Minutes per session: Not on file    Stress: Not on file   Relationships    Social connections:     Talks on phone: Not on file     Gets together: Not on file     Attends [employer] service: Not on file     Active member of club or organization: Not on file     Attends meetings of clubs or organizations: Not on file     Relationship status: Not on file    Intimate partner violence:     Fear of current or ex partner: Not on file     Emotionally abused: Not on file     Physically abused: Not on file     Forced sexual activity: Not on file   Other Topics Concern    Not on file   Social History Narrative    Not on file       Family History   Problem Relation Age of Onset    Diabetes Mother     Hypertension Father     Mental Illness Father     Other Neg Hx         blood clots       Allergies:  Contrast [iodides]    Home Medications:  Prior to Admission medications    Medication Sig Start Date End Date Taking?  Authorizing Provider   DULoxetine (CYMBALTA) 30 MG extended release capsule Take 1 capsule Resp 19   Ht 6' 2\" (1.88 m)   Wt 270 lb (122.5 kg)   SpO2 97%   BMI 34.67 kg/m²     Physical Exam   Constitutional: He is oriented to person, place, and time. He appears well-developed and well-nourished. No distress. HENT:   Head: Normocephalic and atraumatic. Mouth/Throat: Oropharynx is clear and moist.   Eyes: Pupils are equal, round, and reactive to light. EOM are normal.   Neck: Normal range of motion. Cardiovascular: Normal rate and regular rhythm. Pulmonary/Chest: Effort normal. He has no wheezes. He has no rales. Abdominal: Soft. There is tenderness. There is no rebound and no guarding. Tenderness palpation in the epigastric region of the abdomen with mild guarding but no rigidity; non-peritoneal   Musculoskeletal: Normal range of motion. Neurological: He is alert and oriented to person, place, and time. He has normal strength. No cranial nerve deficit or sensory deficit. GCS eye subscore is 4. GCS verbal subscore is 5. GCS motor subscore is 6. Skin: Skin is warm and dry.    Psychiatric: His behavior is normal.       DIFFERENTIAL  DIAGNOSIS     PLAN (LABS / Ples Rile / EKG):  Orders Placed This Encounter   Procedures    Culture Stool    C Diff Toxin by RT PCR -  [facility], [facility], [facility], [place] - ONLY    CT ABDOMEN PELVIS W IV CONTRAST Additional Contrast? None    XR CHEST STANDARD (2 VW)    CBC Auto Differential    Comprehensive Metabolic Panel    LIPASE    Giardia / Cryptosporidum antigens, DFA    Urinalysis Reflex to Culture    Inpatient consult to Internal Medicine    Insert peripheral IV    PATIENT STATUS (FROM ED OR OR/PROCEDURAL) Inpatient       MEDICATIONS ORDERED:  Orders Placed This Encounter   Medications    0.9 % sodium chloride bolus    ondansetron (ZOFRAN) injection 4 mg    ketorolac (TORADOL) injection 30 mg    promethazine (PHENERGAN) injection 25 mg    iohexol (OMNIPAQUE 350) solution 75 mL    diphenhydrAMINE (BENADRYL) 50 MG/ML injection Hailey Kocher: cabinet override    EPINEPHrine 1 MG/ML injection     PRAVEEN ISLAS: cabinet override    EPINEPHrine 1 MG/ML injection 0.35 mg    diphenhydrAMINE (BENADRYL) injection 50 mg    methylPREDNISolone sodium (SOLU-MEDROL) injection 125 mg    EPINEPHrine 1 MG/ML injection 0.35 mg    dicyclomine (BENTYL) injection 20 mg       DIAGNOSTIC RESULTS / EMERGENCY DEPARTMENT COURSE / MDM     LABS:  Results for orders placed or performed during the hospital encounter of 05/24/19   CBC Auto Differential   Result Value Ref Range    WBC 16.7 (H) 3.5 - 11.3 k/uL    RBC 5.69 4.21 - 5.77 m/uL    Hemoglobin 16.9 13.0 - 17.0 g/dL    Hematocrit 49.2 40.7 - 50.3 %    MCV 86.5 82.6 - 102.9 fL    MCH 29.7 25.2 - 33.5 pg    MCHC 34.3 28.4 - 34.8 g/dL    RDW 11.9 11.8 - 14.4 %    Platelets 230 333 - 491 k/uL    MPV 9.2 8.1 - 13.5 fL    NRBC Automated 0.0 0.0 per 100 WBC    Differential Type NOT REPORTED     Seg Neutrophils 83 (H) 36 - 65 %    Lymphocytes 11 (L) 24 - 43 %    Monocytes 5 3 - 12 %    Eosinophils % 0 (L) 1 - 4 %    Basophils 0 0 - 2 %    Immature Granulocytes 1 (H) 0 %    Segs Absolute 13.81 (H) 1.50 - 8.10 k/uL    Absolute Lymph # 1.90 1.10 - 3.70 k/uL    Absolute Mono # 0.79 0.10 - 1.20 k/uL    Absolute Eos # 0.05 0.00 - 0.44 k/uL    Basophils # 0.07 0.00 - 0.20 k/uL    Absolute Immature Granulocyte 0.12 0.00 - 0.30 k/uL    WBC Morphology NOT REPORTED     RBC Morphology NOT REPORTED     Platelet Estimate NOT REPORTED    Comprehensive Metabolic Panel   Result Value Ref Range    Glucose 114 (H) 70 - 99 mg/dL    BUN 8 6 - 20 mg/dL    CREATININE 0.90 0.70 - 1.20 mg/dL    Bun/Cre Ratio NOT REPORTED 9 - 20    Calcium 9.9 8.6 - 10.4 mg/dL    Sodium 140 135 - 144 mmol/L    Potassium 3.4 (L) 3.7 - 5.3 mmol/L    Chloride 101 98 - 107 mmol/L    CO2 24 20 - 31 mmol/L    Anion Gap 15 9 - 17 mmol/L    Alkaline Phosphatase 108 40 - 129 U/L    ALT 30 5 - 41 U/L    AST 26 <40 U/L    Total Bilirubin 0.94 0.3 - 1.2 mg/dL Total Protein 8.9 (H) 6.4 - 8.3 g/dL    Alb 5.0 3.5 - 5.2 g/dL    Albumin/Globulin Ratio 1.3 1.0 - 2.5    GFR Non-African American >60 >60 mL/min    GFR African American >60 >60 mL/min    GFR Comment          GFR Staging NOT REPORTED    LIPASE   Result Value Ref Range    Lipase 13 13 - 60 U/L       IMPRESSION: Patient presents with nausea, vomiting, diarrhea, epigastric belly pain. Symptoms are consistent with viral syndrome. He is tachycardic, has a temperature 100.1. Nontoxic, but slightly ill-appearing. No abnormal heart sounds heard, lungs are clear to auscultation. Does have some tenderness in the epigastric region of the abdomen with minimal guarding, but no rigidity. Abdomen is non-peritoneal.  Given presentation, we'll plan to treat with fluids, Toradol, obtain some abdominal labs and reassess. RADIOLOGY:  Xr Chest Standard (2 Vw)    Result Date: 5/24/2019  EXAMINATION: TWO XRAY VIEWS OF THE CHEST 5/24/2019 7:55 pm COMPARISON: 04/09/2015 HISTORY: ORDERING SYSTEM PROVIDED HISTORY: cough TECHNOLOGIST PROVIDED HISTORY: cough Ordering Physician Provided Reason for Exam: cough Acuity: Unknown Type of Exam: Unknown FINDINGS: Heart size is normal.  Mediastinal contours are normal.  Pulmonary vascularity is normal.  No focal lung consolidation. No acute disease     Ct Abdomen Pelvis W Iv Contrast Additional Contrast? None    Result Date: 5/24/2019  EXAMINATION: CT OF THE ABDOMEN AND PELVIS WITH CONTRAST 5/24/2019 7:49 pm TECHNIQUE: CT of the abdomen and pelvis was performed with the administration of intravenous contrast. Multiplanar reformatted images are provided for review. Dose modulation, iterative reconstruction, and/or weight based adjustment of the mA/kV was utilized to reduce the radiation dose to as low as reasonably achievable.  COMPARISON: Abdomen and pelvis CT dated 04/09/2015 HISTORY: ORDERING SYSTEM PROVIDED HISTORY: abd pain TECHNOLOGIST PROVIDED HISTORY: Ordering Physician Provided Reason for Exam: bd pain Acuity: Unknown Type of Exam: Unknown FINDINGS: Lower Chest: Normal aeration of the lung bases. Heart is normal in size. No pericardial effusion. Organs: Liver, gallbladder, pancreas, spleen, bilateral adrenal glands are unremarkable. Bilateral kidneys and ureters are unremarkable. GI/Bowel: Stomach, small and large bowel loops are grossly unremarkable. The appendix is normal. Pelvis: Urinary bladder is unremarkable. No lymphadenopathy. No soft tissue masses or abnormal fluid collections. Peritoneum/Retroperitoneum: No free intraperitoneal fluid or air. No abdominal aortic aneurysm. No retroperitoneal lymphadenopathy. Bones/Soft Tissues: No lytic or blastic lesions in all visualized osseous structures. No acute findings. EKG  None    All EKG's are interpreted by the Emergency Department Physician who either signs or Co-signs this chart in the absence of a cardiologist.    EMERGENCY DEPARTMENT COURSE:  Patient updated on lab results. Did have a white count of 16.7. Heart rate improved with IV fluids, temperature improved with Toradol as well. However, patient had persistent nausea in the ED. Given the white count presentation, CT of the abdomen is obtained. Appears that he had a reaction to the contrast dye. Complaint of throat swelling and muffled voice. He was promptly given epinephrine IM, Benadryl and Solu-Medrol. Oropharynx is normal in appearance at this time, there is no tongue swelling. Heart and lungs were normal on exam.  Patient was redosed with another intramuscular injection of epinephrine. After reevaluation, states that his throat swelling had improved and he felt back to baseline. However, he had persistent nausea and belly discomfort. As such, decision was made to have the patient admitted for monitoring, IV fluids and continued antiemetics. 8:16 PM  Patient with a reaction to contrast dye. Given epinephrine, benadryl,  Solumedrol. PROCEDURES:  None    CONSULTS:  IP CONSULT TO INTERNAL MEDICINE    CRITICAL CARE:  None    FINAL IMPRESSION      1. Abdominal pain, epigastric    2. Non-intractable vomiting with nausea, unspecified vomiting type    3.  Allergic reaction to contrast material, initial encounter          DISPOSITION / [address] Admitted 05/24/2019 11:11:43 PM      PATIENT REFERRED TO:  Christina Pompa MD  [place] [address]  [phone]            DISCHARGE MEDICATIONS:  New Prescriptions    No medications on file       Adrian Robledo MD  Emergency Medicine Resident    (Please note that portions of thisnote were completed with a voice recognition program.  Efforts were made to edit the dictations but occasionally words are mis-transcribed.)        Ericka Horton MD  Resident  05/24/19 [id number]

## 2019-05-25 NOTE — CARE COORDINATION
Case Management Initial Discharge Plan  Dominga Starr,             Met with:patient to discuss discharge plans. Information verified: address, contacts, phone number, , insurance Yes  PCP: Terence Laguna MD  Date of last visit: years ago    Insurance Provider: [facility]    Discharge Planning    Living Arrangements:  Pt stated he lives with his mother in a mobile home   Support Systems: family, friends      Patient able to perform ADL's:Independent    Current Services (outpatient & in home) none  DME equipment: crutches, cane, knee scooter, transfer bench  DME provider:     Pharmacy:[facility] in [address] Medications:  No  Does patient want to participate in local refill/ meds to beds program?  Yes    Potential Assistance Needed:  N/A    Patient agreeable to home care: No - prior [facility] [place], [facility] with [facility] in [facility]  Freedom of choice provided:  n/a    Prior SNF/Rehab Placement and Facility:[address]  Agreeable to SNF/Rehab: No  [place] of choice provided: n/a   Evaluation: no    Expected Discharge date:  19  Patient expects to be discharged to:  Home  Follow Up Appointment: Best Day/ Time: Wednesday AM    Transportation provider: family, friends  Transportation arrangements needed for discharge: No    Readmission Risk              Risk of Unplanned Readmission:        6             Does patient have a readmission risk score greater than 14?: No  If yes, follow-up appointment must be made within 7 days of discharge.      Discharge Plan: Pt will return home independently          Electronically signed by LEONEL Bautista LISW on 19 at 10:29 AM

## 2019-05-25 NOTE — CONSULTS
THE [facility] AT [facility]  Consultation Note     . REASON FOR CONSULTATION:  Probable history of PUD. HISTORY OF PRESENT ILLNESS:      34year old male with past medical history of dyspepsia with extensive workup previously done with EGD at outside facility in 2017 showing duodenitis with esophagitis with no acute bleed. Patient was previously put on protonics but has been having these symptoms on/off for last 2 years now. States that for the first time last 2 weeks started having recurrent bloody vomiting with episodes of black stools. Episodes have been occurring at the rate of 2-4 times per day. Having symptoms of severe dyspepsia with regurg which is not related to any food intake position or timing of the day. Also has a history of complex regional pain syndrome being followed by pain management for which he is on celecoxib, Topamax and Lyrica. Currently hemoglobin is stable however iron studies showing no iron and sats with normal UIBC and TIBC. H. pylori workup has not been done. Stool workup pending. We will order H. pylori antigen and likely need to treat if positive. Previously negative for H. pylori. EGD 2/16/2017   Findings: Duodenum: There was evidence of duodenitis in the duodenal bulb. The 2nd portion of the duodenum and 3rd portion of the duodenum   appeared to be normal. Multiple random biopsies were taken from the duodenum. Stomach: The stomach appeared to be normal. Multiple random biopsies were taken from the stomach. Esophagus: Possible esophagitis was found in the GE junction. Multiple   biopsies were taken from the GE junction and proximal third of the esophagus. Estimated Blood Loss: Minimal. Recommendations: Start or resume current diet as tolerated. Follow-up on the results of the biopsy specimens in 2 weeks. Follow-up appointment   with endoscopist in 1 month.              PAST MEDICAL HISTORY:  Past Medical History:   Diagnosis Date    Acute osteomyelitis of left foot ([address]     Cellulitis 9/24/2018    Cellulitis and abscess of foot 9/24/2018    Closed displaced fracture of base of fourth metacarpal bone of right hand 7/23/2018    Foot fracture, right 08/2018    GERD (gastroesophageal reflux disease)     no longer on RX    Hand fracture, right 08/2018     Past Surgical History:   Procedure Laterality Date    FINGER CLOSED REDUCTION  07/25/2018    CLOSED REDUCTION PERC PINNING SMALL AND FINGER FINGER METACARPAL BASE -    NERVE BLOCK Right 01/03/2019    right lumbar sympathetic- decadron 10 mg    NERVE BLOCK Right 01/15/2019    rt lumbar sympathetic block. no steroid. Naropen    NERVE BLOCK  01/30/2019    right lumbar sympathetic # 3,  isovue,decadron, naropin,isovue    OPEN TREATMENT METATARSAL FRACTURE EACH Right 8/29/2018    OPEN REDUCTION INTERNAL FIXATION 5TH METATARSAL, MEDIAL MALLEOLUS ORIF  ([address] 28 - JOYCE SLOAT) performed by Saniya Martinez DPM at [facility]  08/29/2018     OPEN REDUCTION INTERNAL FIXATION 5TH METATARSAL, MEDIAL MALLEOLUS ORIF  ([address] 28 - JOYCE SLOAT) (Right Foot)    OTHER SURGICAL HISTORY Right 08/29/2018    ORIF 5th metatarsal    SC OFFICE/OUTPT VISIT,PROCEDURE ONLY Right 7/25/2018    CLOSED REDUCTION PERC PINNING SMALL AND FINGER FINGER METACARPAL BASE - C ARM, SYNTHES performed by Myrna Yun MD at [address]         ALLERGIES:  Allergies   Allergen Reactions    Contrast [Iodides] Shortness Of Breath     SOB, throat tightness, and chest tightness       HOME MEDICATIONS:  Prior to Admission medications    Medication Sig Start Date End Date Taking? Authorizing Provider   DULoxetine (CYMBALTA) 30 MG extended release capsule Take 1 capsule by mouth daily 5/21/19 6/20/19  Shu Calderón MD   pregabalin (LYRICA) 75 MG capsule Take 1 capsule by mouth 3 times daily for 30 days.  5/21/19 6/20/19  Shu Calderón MD   celecoxib distress  Head:  Normocephalic, Atraumatic  EENT: EOMI, Sclera not icteric, Oropharynx moist  Neck:  Supple, Trachea midline  Lungs:CTA Bilaterally  Heart: RRR, No murmur, No rub, No gallop, PMI nondisplaced. Abdomen: Mild epigastric pain to deep palpation. No organomegaly. Ext:No clubbing. No cyanosis. No edema. Skin: No rashes. No jaundice. No stigmata of liver disease. Neuro:  A&O x Three, No focal neurological deficits    LABS and IMAGING:     Hemotological labs:   ANEMIA STUDIES  Recent Labs     05/25/19 0619   LABIRON 13*   TIBC 285   FERRITIN 137       CBC  Recent Labs     05/24/19 1904 05/25/19 0619   WBC 16.7* 9.7   HGB 16.9 15.2   MCV 86.5 88.3   RDW 11.9 11.9    403       PT/INR  No results for input(s): PROTIME, INR in the last 72 hours. BMP  Recent Labs     05/24/19 1904 05/25/19 0619    142   K 3.4* 4.0    103   CO2 24 24   BUN 8 9   CREATININE 0.90 0.81   GLUCOSE 114* 122*   CALCIUM 9.9 9.4       LIVER WORK UP  Hepatitis Functional Panel:  Recent Labs     05/24/19 1904   ALKPHOS 108   ALT 30   AST 26   PROT 8.9*   BILITOT 0.94   LABALBU 5.0       AMYLASE/LIPASE/AMMONIA  Recent Labs     05/24/19 1904   LIPASE 13       Acute Hepatitis Panel   No results found for: HEPBSAG, HEPCAB, HEPBIGM, HEPAIGM    Cancer Markers:  CEA:    No results for input(s): CEA in the last 72 hours. Ca 125:   No results for input(s):  in the last 72 hours. Ca 19-9:     Invalid input(s):   AFP: No results for input(s): AFP in the last 72 hours. 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 *       Assessment:  1. Recurrent nausea and vomiting with new onset melena for the last 2 weeks with a history of duodenitis and esophagitis H pylori negative.   Currently patient in NAD. Mild epigastric pain. 2. Complex regional pain syndrome being followed by pain specialist and is on celecoxib, Topamax and lyrica. Plan Of Care:  1. Continue PPI. Monitor for any vomiting. Can change to PPI PO if tolerates. 2. Iron studies showing low iron with normal TIBC and UIBC, looks like loss of blood from somewhere. H pylori follow up.        Itzel Richards MD  THE [facility] AT [facility]  [address] [facility]         5/25/2019, 11:10 AM  [phone]

## 2019-05-25 NOTE — ED NOTES
Pt returned from CT scan reporting throat and chest tightness and feeling SOB. Dr. Kathleen Jaquez at bedside upon return to room. V/o received for allergic reaction meds. Pt re- connected to bedside cardiac, BP, and O2 monitor.      Vickie Ko RN  05/24/19 2026

## 2019-05-25 NOTE — ED NOTES
Pt stable and in NAD upon transfer to floor. Pt transported to floor via w/c w/o difficulty.      Adrián Hardin RN  05/25/19 9885

## 2019-05-26 LAB
ABSOLUTE EOS #: 0.06 K/UL (ref 0–0.44)
ABSOLUTE IMMATURE GRANULOCYTE: 0.06 K/UL (ref 0–0.3)
ABSOLUTE LYMPH #: 3.37 K/UL (ref 1.1–3.7)
ABSOLUTE MONO #: 0.67 K/UL (ref 0.1–1.2)
BASOPHILS # BLD: 1 % (ref 0–2)
BASOPHILS ABSOLUTE: 0.07 K/UL (ref 0–0.2)
DIFFERENTIAL TYPE: ABNORMAL
EOSINOPHILS RELATIVE PERCENT: 1 % (ref 1–4)
HCT VFR BLD CALC: 41.2 % (ref 40.7–50.3)
HEMOGLOBIN: 13.5 G/DL (ref 13–17)
IMMATURE GRANULOCYTES: 1 %
LACTOFERRIN, QUAL: NORMAL
LYMPHOCYTES # BLD: 35 % (ref 24–43)
MCH RBC QN AUTO: 29.3 PG (ref 25.2–33.5)
MCHC RBC AUTO-ENTMCNC: 32.8 G/DL (ref 28.4–34.8)
MCV RBC AUTO: 89.4 FL (ref 82.6–102.9)
MONOCYTES # BLD: 7 % (ref 3–12)
NRBC AUTOMATED: 0 PER 100 WBC
PDW BLD-RTO: 12.1 % (ref 11.8–14.4)
PLATELET # BLD: 342 K/UL (ref 138–453)
PLATELET ESTIMATE: ABNORMAL
PMV BLD AUTO: 9.3 FL (ref 8.1–13.5)
RBC # BLD: 4.61 M/UL (ref 4.21–5.77)
RBC # BLD: ABNORMAL 10*6/UL
SEG NEUTROPHILS: 57 % (ref 36–65)
SEGMENTED NEUTROPHILS ABSOLUTE COUNT: 5.51 K/UL (ref 1.5–8.1)
WBC # BLD: 9.7 K/UL (ref 3.5–11.3)
WBC # BLD: ABNORMAL 10*3/UL

## 2019-05-26 PROCEDURE — 99233 SBSQ HOSP IP/OBS HIGH 50: CPT | Performed by: INTERNAL MEDICINE

## 2019-05-26 PROCEDURE — 93005 ELECTROCARDIOGRAM TRACING: CPT

## 2019-05-26 PROCEDURE — 6370000000 HC RX 637 (ALT 250 FOR IP): Performed by: INTERNAL MEDICINE

## 2019-05-26 PROCEDURE — 36415 COLL VENOUS BLD VENIPUNCTURE: CPT

## 2019-05-26 PROCEDURE — 51798 US URINE CAPACITY MEASURE: CPT

## 2019-05-26 PROCEDURE — APPNB45 APP NON BILLABLE 31-45 MINUTES: Performed by: INTERNAL MEDICINE

## 2019-05-26 PROCEDURE — 6360000002 HC RX W HCPCS: Performed by: INTERNAL MEDICINE

## 2019-05-26 PROCEDURE — 6370000000 HC RX 637 (ALT 250 FOR IP): Performed by: STUDENT IN AN ORGANIZED HEALTH CARE EDUCATION/TRAINING PROGRAM

## 2019-05-26 PROCEDURE — 6360000002 HC RX W HCPCS: Performed by: STUDENT IN AN ORGANIZED HEALTH CARE EDUCATION/TRAINING PROGRAM

## 2019-05-26 PROCEDURE — 1200000000 HC SEMI PRIVATE

## 2019-05-26 PROCEDURE — C9113 INJ PANTOPRAZOLE SODIUM, VIA: HCPCS | Performed by: INTERNAL MEDICINE

## 2019-05-26 PROCEDURE — 87506 IADNA-DNA/RNA PROBE TQ 6-11: CPT

## 2019-05-26 PROCEDURE — 2580000003 HC RX 258: Performed by: INTERNAL MEDICINE

## 2019-05-26 PROCEDURE — 99999 PR OFFICE/OUTPT VISIT,PROCEDURE ONLY: CPT | Performed by: INTERNAL MEDICINE

## 2019-05-26 PROCEDURE — 85025 COMPLETE CBC W/AUTO DIFF WBC: CPT

## 2019-05-26 RX ORDER — PROMETHAZINE HYDROCHLORIDE 25 MG/ML
6.25 INJECTION, SOLUTION INTRAMUSCULAR; INTRAVENOUS EVERY 6 HOURS PRN
Status: DISCONTINUED | OUTPATIENT
Start: 2019-05-26 | End: 2019-05-29 | Stop reason: HOSPADM

## 2019-05-26 RX ORDER — DOCUSATE SODIUM 100 MG/1
100 CAPSULE, LIQUID FILLED ORAL DAILY
Status: DISCONTINUED | OUTPATIENT
Start: 2019-05-26 | End: 2019-05-29 | Stop reason: HOSPADM

## 2019-05-26 RX ORDER — DIPHENHYDRAMINE HYDROCHLORIDE 50 MG/ML
10 INJECTION INTRAMUSCULAR; INTRAVENOUS
Status: DISCONTINUED | OUTPATIENT
Start: 2019-05-26 | End: 2019-05-29 | Stop reason: HOSPADM

## 2019-05-26 RX ORDER — SUCRALFATE 1 G/1
1 TABLET ORAL EVERY 6 HOURS SCHEDULED
Status: DISCONTINUED | OUTPATIENT
Start: 2019-05-26 | End: 2019-05-29 | Stop reason: HOSPADM

## 2019-05-26 RX ADMIN — Medication 10 ML: at 22:33

## 2019-05-26 RX ADMIN — SUCRALFATE 1 G: 1 TABLET ORAL at 17:43

## 2019-05-26 RX ADMIN — ACETAMINOPHEN 650 MG: 325 TABLET ORAL at 17:48

## 2019-05-26 RX ADMIN — Medication 10 ML: at 08:26

## 2019-05-26 RX ADMIN — Medication 10 ML: at 21:49

## 2019-05-26 RX ADMIN — ONDANSETRON HYDROCHLORIDE 4 MG: 2 INJECTION, SOLUTION INTRAMUSCULAR; INTRAVENOUS at 21:48

## 2019-05-26 RX ADMIN — PANTOPRAZOLE SODIUM 40 MG: 40 INJECTION, POWDER, FOR SOLUTION INTRAVENOUS at 21:47

## 2019-05-26 RX ADMIN — Medication 10 ML: at 22:31

## 2019-05-26 RX ADMIN — ONDANSETRON HYDROCHLORIDE 4 MG: 2 INJECTION, SOLUTION INTRAMUSCULAR; INTRAVENOUS at 17:48

## 2019-05-26 RX ADMIN — PANTOPRAZOLE SODIUM 40 MG: 40 INJECTION, POWDER, FOR SOLUTION INTRAVENOUS at 08:25

## 2019-05-26 RX ADMIN — ACETAMINOPHEN 650 MG: 325 TABLET ORAL at 21:47

## 2019-05-26 RX ADMIN — Medication 10 MG: at 17:48

## 2019-05-26 RX ADMIN — PROMETHAZINE HYDROCHLORIDE 6.25 MG: 25 INJECTION INTRAMUSCULAR; INTRAVENOUS at 01:38

## 2019-05-26 RX ADMIN — DOCUSATE SODIUM 100 MG: 100 CAPSULE, LIQUID FILLED ORAL at 15:16

## 2019-05-26 RX ADMIN — Medication 10 ML: at 08:27

## 2019-05-26 ASSESSMENT — PAIN SCALES - GENERAL
PAINLEVEL_OUTOF10: 8
PAINLEVEL_OUTOF10: 4
PAINLEVEL_OUTOF10: 0
PAINLEVEL_OUTOF10: 8

## 2019-05-26 ASSESSMENT — PAIN DESCRIPTION - PAIN TYPE
TYPE: ACUTE PAIN
TYPE: ACUTE PAIN

## 2019-05-26 ASSESSMENT — PAIN DESCRIPTION - LOCATION
LOCATION: ABDOMEN
LOCATION: ABDOMEN

## 2019-05-26 NOTE — PROGRESS NOTES
[address] [facility].    PROGRESS NOTE             Date:   5/26/2019  Patient name:  Fadia Gaytan  Date of admission:  5/24/2019  6:28 PM  MRN:   [id number]  YOB: 1990    CHIEF COMPLAINT     Abdominal pain, diarrhea, vomiting    History Obtained From:  Patient and chart review. HISTORY OF PRESENT ILLNESS      The patient is a 34 y.o.  male who is admitted to the hospital for Abdominal pain, diarrhea, vomiting. This could be either secondary to Yas-German syndrome or gastritis. Patient also takes regular celecoxib along with ibuprofen and Motrin occasionally and hence at high risk of PUD although his hemoglobin was 16.9. BUN was not elevated. GI was consulted and recommend non emergent EGD. Current eval  Vitals stable  Hb stable  No hemetemesis episode in hospital  But still nauseous and refusing PO intake    PAST MEDICAL HISTORY       has a past medical history of Acute osteomyelitis of left foot ([address] Cellulitis, Cellulitis and abscess of foot, Closed displaced fracture of base of fourth metacarpal bone of right hand, Foot fracture, right, GERD (gastroesophageal reflux disease), and Hand fracture, right. PAST SURGICAL HISTORY       has a past surgical history that includes [place] tooth extraction; Finger Closed Reduction (07/25/2018); pr office/outpt visit,procedure only (Right, 7/25/2018); Upper gastrointestinal endoscopy; other surgical history (08/29/2018); other surgical history (Right, 08/29/2018); open treatment metatarsal fracture each (Right, 8/29/2018); Nerve Block (Right, 01/03/2019); Nerve Block (Right, 01/15/2019); and Nerve Block (01/30/2019). HOME MEDICATIONS        Prior to Admission medications    Medication Sig Start Date End Date Taking?  Authorizing Provider   DULoxetine (CYMBALTA) 30 MG extended release capsule Take 1 capsule by mouth daily 5/21/19 6/20/19  Arnel Diehl MD   pregabalin (LYRICA) 75 MG capsule Take 1 capsule by mouth 3 times daily for 30 days. 5/21/19 6/20/19  Rex Hashimoto, MD   celecoxib (CELEBREX) 200 MG capsule Take 1 capsule by mouth daily 5/21/19 6/20/19  Rex Hashimoto, MD   HYDROcodone-acetaminophen [facility] AND [facility]) 5-325 MG per tablet Take 1 tablet by mouth 2 times daily as needed for Pain. Segundo Dickey Historical Provider, MD   Povidone-Iodine Scrub Sponge (BETADINE SWAB AID) 10 % SWAB Apply 1 Stick topically daily 1/9/19   [facility]   Mis. Devices (TUB TRANSFER BOARD) MISC 1 Units by Does not apply route daily 12/19/18   MELONIE Wilde. Devices MERIT [facility]) MIS 1 Units by Does not apply route daily 12/19/18   Waldemar Barrientos DPM   acetaminophen (TYLENOL) 325 MG tablet Take 1 tablet by mouth every 6 hours as needed for Pain 12/3/18   Cuong Naoples MD   Misc. Devices (FREE SPIRIT KNEE/LEG WALKER) MISC Please use walker to mobilize until cleared by podiatry to bear weight on foot. 8/10/18   Flavio Centeno MD       ALLERGIES      Contrast [iodides]    SOCIAL HISTORY       reports that he quit smoking about 16 months ago. His smoking use included cigarettes. He has a 1.50 pack-year smoking history. He has never used smokeless tobacco. He reports that he drinks alcohol. He reports that he does not use drugs. FAMILY HISTORY      family history includes Diabetes in his mother; Hypertension in his father; Mental Illness in his father. REVIEW OF SYSTEMS      · Constitutional: Negative for weight loss  · Eyes: Negative for visual changes, diplopia, scleral icterus. · ENT: Negative for Headaches, hearing loss, vertigo, mouth sores, sore throat. · Cardiovascular: Negative for lightheadedness/orthostatic symptoms ,chest pain, dyspnea on exertion, palpitations or loss of consciousness. · Respiratory: Negative for cough or wheezing, sputum production, hemoptysis, pleuritic pain.   · Gastrointestinal: Negative for nausea/vomiting, change in bowel habits, abdominal pain, dysphagia/appetite loss, hematemesis, blood in stools. · Genitourinary:Negative for change in bladder habits, dysuria, trouble voiding, hematuria. · Musculoskeletal: Negative for gait disturbance, weakness, joint complaints. · Integumentary: Negative for rash, pruritis. · Neurological: Negative for headache, dizziness, change in muscle strength, numbness/tingling, change in gait, balance, coordination,   · Psychiatric: negative for change in mood, affect, memory, mentation, behavior. · Endocrine: negative for temperature intolerance, excessive thirst, fluid intake, or urination, tremor. · Hematologic/Lymphatic: negative for abnormal bruising or bleeding, blood clots, swollen lymph nodes. · Allergic/Immunologic: negative for nasal congestion, pruritis, hives. PHYSICAL EXAM      /76   Pulse 57   Temp 98.1 °F (36.7 °C) (Oral)   Resp 20   Ht 6' 2\" (1.88 m)   Wt 270 lb (122.5 kg)   SpO2 99%   BMI 34.67 kg/m²      · General appearance: well nourished  · HEENT: Head: Normocephalic, no lesions, without obvious abnormality. · Lungs: clear to auscultation bilaterally  · Heart: regular rate and rhythm, S1, S2 normal, no murmur, click, rub or gallop  · Abdomen: soft, non-tender; bowel sounds normal; no masses,  no organomegaly  · Extremities: extremities normal, atraumatic, no cyanosis or edema  · Neurological: Gait normal. Reflexes normal and symmetric. Sensation grossly normal  · Skin - no rash, no lump   · Eye no icterus no redness  · Psych-normal affect   · NEURO-no limb weakness  No facial droop  · Lymphatic system-no lymphadenopathy no splenomegaly     DIAGNOSTICS      Laboratory Testing:  CBC:   Recent Labs     05/26/19  0610   WBC 9.7   HGB 13.5        BMP:    Recent Labs     05/24/19  1904 05/25/19  0619    142   K 3.4* 4.0    103   CO2 24 24   BUN 8 9   CREATININE 0.90 0.81   GLUCOSE 114* 122*     S. Calcium:  Recent Labs     05/25/19  0619   CALCIUM 9.4     S.  Ionized lactoferrin, stool culture. - no stool sample yet  4. EPC for DVT prophylaxis    Discharge - expect today home as PO improves          MD LUIS Day[address], [address] [place] [address]. Phone [phone]   Fax: [phone]  Answering Service: [phone]      IM Attending    Pt seen and examined today   I have discussed the care of pt , including pertinent history and exam findings,  with the resident. I have reviewed the key elements of all parts of the encounter with the resident. I agree with the assessment, plan and orders as documented by the resident.     Electronically signed by Maranda Walker MD on 5/26/2019 at 11:51 PM

## 2019-05-26 NOTE — PROGRESS NOTES
30   AST 26   PROT 8.9*   BILITOT 0.94   LABALBU 5.0       AMYLASE/LIPASE/AMMONIA  Recent Labs     05/24/19  1904   LIPASE 13       PT/INR  No results for input(s): PROTIME, INR in the last 72 hours. ANEMIA STUDIES  Recent Labs     05/25/19  0619   LABIRON 13*   TIBC 285   FERRITIN 137       IMAGING    Ct Abdomen Pelvis W Iv Contrast Additional Contrast? None    Result Date: 5/24/2019  EXAMINATION: CT OF THE ABDOMEN AND PELVIS WITH CONTRAST 5/24/2019 7:49 pm TECHNIQUE: CT of the abdomen and pelvis was performed with the administration of intravenous contrast. Multiplanar reformatted images are provided for review. Dose modulation, iterative reconstruction, and/or weight based adjustment of the mA/kV was utilized to reduce the radiation dose to as low as reasonably achievable. COMPARISON: Abdomen and pelvis CT dated 04/09/2015 HISTORY: ORDERING SYSTEM PROVIDED HISTORY: abd pain TECHNOLOGIST PROVIDED HISTORY: Ordering Physician Provided Reason for Exam: bd pain Acuity: Unknown Type of Exam: Unknown FINDINGS: Lower Chest: Normal aeration of the lung bases. Heart is normal in size. No pericardial effusion. Organs: Liver, gallbladder, pancreas, spleen, bilateral adrenal glands are unremarkable. Bilateral kidneys and ureters are unremarkable. GI/Bowel: Stomach, small and large bowel loops are grossly unremarkable. The appendix is normal. Pelvis: Urinary bladder is unremarkable. No lymphadenopathy. No soft tissue masses or abnormal fluid collections. Peritoneum/Retroperitoneum: No free intraperitoneal fluid or air. No abdominal aortic aneurysm. No retroperitoneal lymphadenopathy. Bones/Soft Tissues: No lytic or blastic lesions in all visualized osseous structures. No acute findings. Assessment:     51-year-old male with a history of recurrent nausea and vomiting and reported melena. 1. Recurrent N/V - emesis resolved. Nausea persist  2.  Hematemesis - resolved - Hgb is stable - DDX: Esophagitis, Yas-German tear, gastritis, PUD  3. Melena - No stool since admission -  4. Epigastric abdominal pain    5. Constipation       Plan:     1. Monitor daily hemoglobin and hematocrit  2. We will plan for non-urgent EGD on Tuesday if scheduling permits. 3. Continue PPI IV twice daily and Carafate 1 g every 6 hours  4. We will start Benadryl 10 mg before meals to help improve nausea  5. Start Colace 100 mg daily. Can increase to twice daily  6. Please call GI with any questions, concerns, or acute change in clinical status    This plan was formulated in collaboration with Dr. Peggy Gonzáles    Electronically signed by Kosta Amaro CNP on 5/26/2019 at 8:13 AM    Please note that this note was generated using a voice recognition dictation software. Although every effort was made to ensure the accuracy of this automated transcription, some errors in transcription may have occurred.

## 2019-05-27 LAB
CAMPYLOBACTER PCR: NORMAL
DIRECT EXAM: NEGATIVE
E COLI ENTEROTOXIGENIC PCR: NORMAL
HCT VFR BLD CALC: 42.3 % (ref 40.7–50.3)
HEMOGLOBIN: 14.3 G/DL (ref 13–17)
Lab: NORMAL
PLESIOMONAS SHIGELLOIDES PCR: NORMAL
SALMONELLA PCR: NORMAL
SHIGATOXIN GENE PCR: NORMAL
SHIGELLA SP PCR: NORMAL
SPECIMEN DESCRIPTION: NORMAL
SPECIMEN DESCRIPTION: NORMAL
VIBRIO PCR: NORMAL
YERSINIA ENTEROCOLITICA PCR: NORMAL

## 2019-05-27 PROCEDURE — 6370000000 HC RX 637 (ALT 250 FOR IP): Performed by: INTERNAL MEDICINE

## 2019-05-27 PROCEDURE — 85014 HEMATOCRIT: CPT

## 2019-05-27 PROCEDURE — 2580000003 HC RX 258: Performed by: INTERNAL MEDICINE

## 2019-05-27 PROCEDURE — 6360000002 HC RX W HCPCS: Performed by: INTERNAL MEDICINE

## 2019-05-27 PROCEDURE — C9113 INJ PANTOPRAZOLE SODIUM, VIA: HCPCS | Performed by: INTERNAL MEDICINE

## 2019-05-27 PROCEDURE — 1200000000 HC SEMI PRIVATE

## 2019-05-27 PROCEDURE — APPNB15 APP NON BILLABLE TIME 0-15 MINS: Performed by: INTERNAL MEDICINE

## 2019-05-27 PROCEDURE — 6370000000 HC RX 637 (ALT 250 FOR IP): Performed by: STUDENT IN AN ORGANIZED HEALTH CARE EDUCATION/TRAINING PROGRAM

## 2019-05-27 PROCEDURE — 6360000002 HC RX W HCPCS: Performed by: STUDENT IN AN ORGANIZED HEALTH CARE EDUCATION/TRAINING PROGRAM

## 2019-05-27 PROCEDURE — 85018 HEMOGLOBIN: CPT

## 2019-05-27 PROCEDURE — 94760 N-INVAS EAR/PLS OXIMETRY 1: CPT

## 2019-05-27 PROCEDURE — 99233 SBSQ HOSP IP/OBS HIGH 50: CPT | Performed by: INTERNAL MEDICINE

## 2019-05-27 PROCEDURE — 99232 SBSQ HOSP IP/OBS MODERATE 35: CPT | Performed by: INTERNAL MEDICINE

## 2019-05-27 PROCEDURE — 36415 COLL VENOUS BLD VENIPUNCTURE: CPT

## 2019-05-27 RX ORDER — LORAZEPAM 2 MG/ML
0.5 INJECTION INTRAMUSCULAR ONCE
Status: COMPLETED | OUTPATIENT
Start: 2019-05-27 | End: 2019-05-27

## 2019-05-27 RX ADMIN — SUCRALFATE 1 G: 1 TABLET ORAL at 12:06

## 2019-05-27 RX ADMIN — SUCRALFATE 1 G: 1 TABLET ORAL at 17:33

## 2019-05-27 RX ADMIN — Medication 10 MG: at 00:38

## 2019-05-27 RX ADMIN — Medication 10 ML: at 08:40

## 2019-05-27 RX ADMIN — LORAZEPAM 0.5 MG: 2 INJECTION, SOLUTION INTRAMUSCULAR; INTRAVENOUS at 20:31

## 2019-05-27 RX ADMIN — SUCRALFATE 1 G: 1 TABLET ORAL at 23:11

## 2019-05-27 RX ADMIN — Medication 10 MG: at 12:06

## 2019-05-27 RX ADMIN — ACETAMINOPHEN 650 MG: 325 TABLET ORAL at 23:16

## 2019-05-27 RX ADMIN — SODIUM CHLORIDE: 9 INJECTION, SOLUTION INTRAVENOUS at 23:14

## 2019-05-27 RX ADMIN — Medication 10 ML: at 08:44

## 2019-05-27 RX ADMIN — Medication 10 ML: at 22:00

## 2019-05-27 RX ADMIN — DOCUSATE SODIUM 100 MG: 100 CAPSULE, LIQUID FILLED ORAL at 08:40

## 2019-05-27 RX ADMIN — Medication 10 MG: at 08:39

## 2019-05-27 RX ADMIN — PANTOPRAZOLE SODIUM 40 MG: 40 INJECTION, POWDER, FOR SOLUTION INTRAVENOUS at 23:11

## 2019-05-27 RX ADMIN — Medication 10 ML: at 08:45

## 2019-05-27 RX ADMIN — Medication 10 MG: at 15:32

## 2019-05-27 RX ADMIN — SUCRALFATE 1 G: 1 TABLET ORAL at 06:41

## 2019-05-27 RX ADMIN — SODIUM CHLORIDE: 9 INJECTION, SOLUTION INTRAVENOUS at 03:58

## 2019-05-27 RX ADMIN — PANTOPRAZOLE SODIUM 40 MG: 40 INJECTION, POWDER, FOR SOLUTION INTRAVENOUS at 08:40

## 2019-05-27 RX ADMIN — Medication 10 ML: at 20:33

## 2019-05-27 ASSESSMENT — PAIN SCALES - GENERAL
PAINLEVEL_OUTOF10: 7
PAINLEVEL_OUTOF10: 7
PAINLEVEL_OUTOF10: 3
PAINLEVEL_OUTOF10: 5
PAINLEVEL_OUTOF10: 4

## 2019-05-27 ASSESSMENT — PAIN DESCRIPTION - PAIN TYPE
TYPE: ACUTE PAIN

## 2019-05-27 ASSESSMENT — PAIN DESCRIPTION - FREQUENCY: FREQUENCY: CONTINUOUS

## 2019-05-27 ASSESSMENT — PAIN DESCRIPTION - LOCATION
LOCATION: ABDOMEN

## 2019-05-27 ASSESSMENT — PAIN DESCRIPTION - ORIENTATION: ORIENTATION: MID

## 2019-05-27 ASSESSMENT — PAIN - FUNCTIONAL ASSESSMENT: PAIN_FUNCTIONAL_ASSESSMENT: PREVENTS OR INTERFERES SOME ACTIVE ACTIVITIES AND ADLS

## 2019-05-27 ASSESSMENT — PAIN DESCRIPTION - ONSET: ONSET: ON-GOING

## 2019-05-27 ASSESSMENT — PAIN DESCRIPTION - DESCRIPTORS: DESCRIPTORS: ACHING

## 2019-05-27 NOTE — PLAN OF CARE
Problem: Falls - Risk of:  Goal: Will remain free from falls  Description  Will remain free from falls  5/27/2019 1049 by Andres Jay RN  Outcome: Ongoing  5/27/2019 0344 by Zuleyma Falk RN  Outcome: Ongoing  Goal: Absence of physical injury  Description  Absence of physical injury  5/27/2019 1049 by Andres Jay RN  Outcome: Ongoing  5/27/2019 0344 by Zuleyma Falk RN  Outcome: Ongoing     Problem: Pain:  Goal: Pain level will decrease  Description  Pain level will decrease  5/27/2019 1049 by Andres Jay RN  Outcome: Ongoing  5/27/2019 0344 by Zuleyma Falk RN  Outcome: Ongoing  Goal: Control of acute pain  Description  Control of acute pain  5/27/2019 1049 by Andres Jay RN  Outcome: Ongoing  5/27/2019 0344 by Zuleyma Falk RN  Outcome: Ongoing  Goal: Control of chronic pain  Description  Control of chronic pain  5/27/2019 1049 by Andres Jay RN  Outcome: Ongoing  5/27/2019 0344 by Zuleyma Falk RN  Outcome: Ongoing

## 2019-05-27 NOTE — PROGRESS NOTES
[address] [facility].    PROGRESS NOTE             Date:   5/27/2019  Patient name:  Dominga Starr  Date of admission:  5/24/2019  6:28 PM  MRN:   [id number]  YOB: 1990    CHIEF COMPLAINT     Abdominal pain, diarrhea, vomiting    History Obtained From:  Patient and chart review. HISTORY OF PRESENT ILLNESS      The patient is a 34 y.o.  male who is admitted to the hospital for Abdominal pain, diarrhea, vomiting. This could be either secondary to Yas-German syndrome or gastritis. Patient also takes regular celecoxib along with ibuprofen and Motrin occasionally and hence at high risk of PUD although his hemoglobin was 16.9. BUN was not elevated. GI was consulted and recommend non emergent EGD. Current eval  Vitals stable  Hb stable  No hemetemesis episode in hospital  But still nauseous and not tolerating PO intake still  Stool workup negative    PAST MEDICAL HISTORY       has a past medical history of Acute osteomyelitis of left foot ([address] Cellulitis, Cellulitis and abscess of foot, Closed displaced fracture of base of fourth metacarpal bone of right hand, Foot fracture, right, GERD (gastroesophageal reflux disease), and Hand fracture, right. PAST SURGICAL HISTORY       has a past surgical history that includes [place] tooth extraction; Finger Closed Reduction (07/25/2018); pr office/outpt visit,procedure only (Right, 7/25/2018); Upper gastrointestinal endoscopy; other surgical history (08/29/2018); other surgical history (Right, 08/29/2018); open treatment metatarsal fracture each (Right, 8/29/2018); Nerve Block (Right, 01/03/2019); Nerve Block (Right, 01/15/2019); and Nerve Block (01/30/2019). HOME MEDICATIONS        Prior to Admission medications    Medication Sig Start Date End Date Taking?  Authorizing Provider   DULoxetine (CYMBALTA) 30 MG extended release capsule Take 1 capsule by mouth daily 5/21/19 6/20/19 Leatha Wang MD   pregabalin (LYRICA) 75 MG capsule Take 1 capsule by mouth 3 times daily for 30 days. 5/21/19 6/20/19  Leatha Wang MD   celecoxib (CELEBREX) 200 MG capsule Take 1 capsule by mouth daily 5/21/19 6/20/19  Leatha Wang MD   HYDROcodone-acetaminophen [facility] AND [facility]) 5-325 MG per tablet Take 1 tablet by mouth 2 times daily as needed for Pain. Ovidio Sahni MD   Povidone-Iodine Scrub Sponge (BETADINE SWAB AID) 10 % SWAB Apply 1 Stick topically daily 1/9/19   Shade Saavedra DPM   Misc. Devices (TUB TRANSFER BOARD) MISC 1 Units by Does not apply route daily 12/19/18   Alvarez Clayton DPM   Misc. Devices MERIT [facility]) MISC 1 Units by Does not apply route daily 12/19/18   Alvarez Clayton DPM   acetaminophen (TYLENOL) 325 MG tablet Take 1 tablet by mouth every 6 hours as needed for Pain 12/3/18   Jesse Blount MD   Misc. Devices (FREE SPIRIT KNEE/LEG WALKER) MISC Please use walker to mobilize until cleared by podiatry to bear weight on foot. 8/10/18   Gayatri Simental MD       ALLERGIES      Contrast [iodides]    SOCIAL HISTORY       reports that he quit smoking about 16 months ago. His smoking use included cigarettes. He has a 1.50 pack-year smoking history. He has never used smokeless tobacco. He reports that he drinks alcohol. He reports that he does not use drugs. FAMILY HISTORY      family history includes Diabetes in his mother; Hypertension in his father; Mental Illness in his father. REVIEW OF SYSTEMS      · Constitutional: Negative for weight loss  · Eyes: Negative for visual changes, diplopia, scleral icterus. · ENT: Negative for Headaches, hearing loss, vertigo, mouth sores, sore throat. · Cardiovascular: Negative for lightheadedness/orthostatic symptoms ,chest pain, dyspnea on exertion, palpitations or loss of consciousness. · Respiratory: Negative for cough or wheezing, sputum production, hemoptysis, pleuritic pain.   · Gastrointestinal: Negative for nausea/vomiting, change in 05/25/19  0619   CALCIUM 9.4     S. Ionized Calcium:No results for input(s): IONCA in the last 72 hours. S. Magnesium:No results for input(s): MG in the last 72 hours. S. Phosphorus:No results for input(s): PHOS in the last 72 hours. S. Glucose:No results for input(s): POCGLU in the last 72 hours. Glycosylated hemoglobin A1C: No results for input(s): LABA1C in the last 72 hours. INR: No results for input(s): INR in the last 72 hours. Hepatic functions:   Recent Labs     05/24/19  1904   ALKPHOS 108   ALT 30   AST 26   PROT 8.9*   BILITOT 0.94   LABALBU 5.0     Pancreatic functions:No results for input(s): LACTA, AMYLASE in the last 72 hours. S. Lactic Acid: No results for input(s): LACTA in the last 72 hours. Cardiac enzymes:No results for input(s): CKTOTAL, CKMB, CKMBINDEX, TROPONINI in the last 72 hours. BNP:No results for input(s): BNP in the last 72 hours. Lipid profile: No results for input(s): CHOL, TRIG, HDL, LDLCALC in the last 72 hours. Invalid input(s): LDL  Blood Gases: No results found for: PH, PCO2, PO2, HCO3, O2SAT  Thyroid functions: No results found for: TSH     Imaging/Diagonstics:      CXR: No acute cardiopulmonary findings. 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 *        PLAN        1. Patient gives a history of dark-colored vomitus and stool. This could be either secondary to Yas-German syndrome or gastritis. Patient also takes regular celecoxib along with ibuprofen and Motrin occasionally and hence at high risk of PUD although his hemoglobin is 16.9. BUN is not elevated. Protonix IV twice a day. zofran and phenargan prn. Added benadryl and sucralfate yesterday. Patient is still nauseous.   GI consultation- plan of EGD phillip  2. Avoid NSAIDs  3. Stool for occult blood, fecal lactoferrin, stool culture. - no stool sample yet  4. EPC for DVT prophylaxis    Discharge - home after EGD      MD LUIS Garner[address], [address] [place] [address].    Phone [phone]   Fax: [phone]  Answering Service: [phone]

## 2019-05-27 NOTE — PLAN OF CARE
No report of fall or injury. Pain control with pain meds. Stool collected and sent to lab. Able to make needs know. Safety precaution in place.

## 2019-05-27 NOTE — PROGRESS NOTES
THE [facility] AT [facility] Progress Note    Zana Fleming is a 34 y.o. male patient. Hospitalization Day:3      Subjective:  No acute issues overnight. Plan EGD tomorrow. NPO after midnight. Benadryl    EGD 2017   Findings: Duodenum: There was evidence of duodenitis in the duodenal bulb. The 2nd portion of the duodenum and 3rd portion of the duodenum   appeared to be normal. Multiple random biopsies were taken from the duodenum. Stomach: The stomach appeared to be normal. Multiple random biopsies were taken from the stomach. Esophagus: Possible esophagitis was found in the GE junction. Multiple   biopsies were taken from the GE junction and proximal third of the esophagus. Estimated Blood Loss: Minimal. Recommendations: Start or resume current diet as tolerated. Follow-up on the results of the biopsy specimens in 2 weeks. Follow-up appointment   with endoscopist in 1 month.            VITALS:  /63   Pulse 53   Temp 99.6 °F (37.6 °C)   Resp 14   Ht 6' 2\" (1.88 m)   Wt 270 lb (122.5 kg)   SpO2 96%   BMI 34.67 kg/m²   TEMPERATURE:  Current - Temp: 99.6 °F (37.6 °C); Max - Temp  Av.5 °F (36.9 °C)  Min: 97.8 °F (36.6 °C)  Max: 99.6 °F (37.6 °C)    Physical Assessment:  General: Well developed, Well nourished, No apparent distress  Head:  Normocephalic, Atraumatic  EENT: EOMI, Sclera not icteric, Oropharynx moist  Neck:  Supple, Trachea midline  Lungs:CTA Bilaterally  Heart: RRR, No murmur, No rub, No gallop, PMI nondisplaced. Abdomen: Mild epigastric pain to deep palpation. No organomegaly. Ext:No clubbing. No cyanosis. No edema. Skin: No rashes. No jaundice. No stigmata of liver disease.     Neuro:  A&O x Three, No focal neurological deficits      Data Review:  LABS and IMAGING:     CBC  Recent Labs     19  1904 19  0619 19  0610   WBC 16.7* 9.7 9.7   HGB 16.9 15.2 13.5   MCV 86.5 88.3 89.4   RDW 11.9 11.9 12.1    403 342       ANEMIA STUDIES  Recent Labs 05/25/19  0619   LABIRON 13*   TIBC 285   FERRITIN 137       BMP  Recent Labs     05/24/19 1904 05/25/19 0619    142   K 3.4* 4.0    103   CO2 24 24   BUN 8 9   CREATININE 0.90 0.81   GLUCOSE 114* 122*   CALCIUM 9.9 9.4       LFTS  Recent Labs     05/24/19 1904   ALKPHOS 108   ALT 30   AST 26   BILITOT 0.94   LABALBU 5.0       AMYLASE/LIPASE/AMMONIA  Recent Labs     05/24/19  1904   LIPASE 13       Acute Hepatitis Panel   No results found for: HEPBSAG, HEPCAB, HEPBIGM, HEPAIGM    HCV Genotype   No results found for: HEPATITISCGENOTYPE    HCV Quantitative   No results found for: HCVQNT    LIVER WORK UP:    AFP  No results found for: AFP    Alpha 1 antitrypsin   No results found for: A1A    Anti - Liver/Kidney Ab  No results found for: LIVER-KIDNEYMICROSOMALAB    HALINA  No results found for: HALINA    AMA  No results found for: MITOAB    ASMA  No results found for: SMOOTHMUSCAB    Ceruloplasmin  No results found for: CERULOPLSM    Celiac panel  No results found for: TISSTRNTIIGG, TTGIGA, IGA    PT/INR  No results for input(s): PROTIME, INR in the last 72 hours. Cancer Markers:  CEA:  No results for input(s): CEA in the last 72 hours. Ca 125:  No results for input(s):  in the last 72 hours. Ca 19-9:   Invalid input(s):   AFP: No results for input(s): AFP in the last 72 hours. Lactic acid:Invalid input(s): LACTIC ACID    Radiology Review:    No results found. 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 *       Assessment:  1. Recurrent nausea and vomiting with new onset melena for the last 2 weeks with a history of duodenitis and esophagitis H pylori negative. Could be cannabis hyperemesis, esophagitis vs erosive gastropathy.  Currently patient in NAD.  Mild epigastric pain. 2. Complex regional pain syndrome being followed by pain specialist and is on celecoxib, Topamax and lyrica.     Plan Of Care:  1. Continue PPI. Monitor for any vomiting. Can change to PPI PO if tolerates. 2. Iron studies showing low iron with normal TIBC and UIBC, looks like loss of blood from somewhere. H pylori follow up. Stool lactoferrin pending. 3. Plan EGD tomorrow. NPO after midnight. Follow Hemoglobin today. Continue zofran, carafate and benadryl before meals.         Yanni Ng MD  THE [facility]  [place], [place]         5/27/2019, 9:06 AM  [phone]

## 2019-05-28 ENCOUNTER — ANESTHESIA (OUTPATIENT)
Dept: ENDOSCOPY | Age: 29
DRG: 241 | End: 2019-05-28
Payer: COMMERCIAL

## 2019-05-28 ENCOUNTER — ANESTHESIA EVENT (OUTPATIENT)
Dept: ENDOSCOPY | Age: 29
DRG: 241 | End: 2019-05-28
Payer: COMMERCIAL

## 2019-05-28 VITALS
SYSTOLIC BLOOD PRESSURE: 139 MMHG | HEART RATE: 60 BPM | WEIGHT: 270 LBS | HEIGHT: 74 IN | OXYGEN SATURATION: 95 % | DIASTOLIC BLOOD PRESSURE: 82 MMHG | BODY MASS INDEX: 34.65 KG/M2 | TEMPERATURE: 98.3 F | RESPIRATION RATE: 20 BRPM

## 2019-05-28 VITALS
DIASTOLIC BLOOD PRESSURE: 85 MMHG | RESPIRATION RATE: 23 BRPM | OXYGEN SATURATION: 93 % | SYSTOLIC BLOOD PRESSURE: 120 MMHG

## 2019-05-28 LAB
EKG ATRIAL RATE: 70 BPM
EKG P AXIS: 60 DEGREES
EKG P-R INTERVAL: 128 MS
EKG Q-T INTERVAL: 428 MS
EKG QRS DURATION: 106 MS
EKG QTC CALCULATION (BAZETT): 462 MS
EKG R AXIS: 6 DEGREES
EKG T AXIS: -7 DEGREES
EKG VENTRICULAR RATE: 70 BPM

## 2019-05-28 PROCEDURE — 7100000011 HC PHASE II RECOVERY - ADDTL 15 MIN: Performed by: INTERNAL MEDICINE

## 2019-05-28 PROCEDURE — 2500000003 HC RX 250 WO HCPCS: Performed by: NURSE ANESTHETIST, CERTIFIED REGISTERED

## 2019-05-28 PROCEDURE — 7100000010 HC PHASE II RECOVERY - FIRST 15 MIN: Performed by: INTERNAL MEDICINE

## 2019-05-28 PROCEDURE — 99232 SBSQ HOSP IP/OBS MODERATE 35: CPT | Performed by: INTERNAL MEDICINE

## 2019-05-28 PROCEDURE — 2709999900 HC NON-CHARGEABLE SUPPLY: Performed by: INTERNAL MEDICINE

## 2019-05-28 PROCEDURE — 6360000002 HC RX W HCPCS: Performed by: INTERNAL MEDICINE

## 2019-05-28 PROCEDURE — 0DB98ZX EXCISION OF DUODENUM, VIA NATURAL OR ARTIFICIAL OPENING ENDOSCOPIC, DIAGNOSTIC: ICD-10-PCS | Performed by: INTERNAL MEDICINE

## 2019-05-28 PROCEDURE — 7100000000 HC PACU RECOVERY - FIRST 15 MIN: Performed by: INTERNAL MEDICINE

## 2019-05-28 PROCEDURE — 0DB68ZX EXCISION OF STOMACH, VIA NATURAL OR ARTIFICIAL OPENING ENDOSCOPIC, DIAGNOSTIC: ICD-10-PCS | Performed by: INTERNAL MEDICINE

## 2019-05-28 PROCEDURE — 88305 TISSUE EXAM BY PATHOLOGIST: CPT

## 2019-05-28 PROCEDURE — 6360000002 HC RX W HCPCS: Performed by: STUDENT IN AN ORGANIZED HEALTH CARE EDUCATION/TRAINING PROGRAM

## 2019-05-28 PROCEDURE — 7100000001 HC PACU RECOVERY - ADDTL 15 MIN: Performed by: INTERNAL MEDICINE

## 2019-05-28 PROCEDURE — 3609012400 HC EGD TRANSORAL BIOPSY SINGLE/MULTIPLE: Performed by: INTERNAL MEDICINE

## 2019-05-28 PROCEDURE — 1200000000 HC SEMI PRIVATE

## 2019-05-28 PROCEDURE — 88342 IMHCHEM/IMCYTCHM 1ST ANTB: CPT

## 2019-05-28 PROCEDURE — 2580000003 HC RX 258: Performed by: INTERNAL MEDICINE

## 2019-05-28 PROCEDURE — 3700000000 HC ANESTHESIA ATTENDED CARE: Performed by: INTERNAL MEDICINE

## 2019-05-28 PROCEDURE — 6360000002 HC RX W HCPCS: Performed by: NURSE ANESTHETIST, CERTIFIED REGISTERED

## 2019-05-28 PROCEDURE — 94760 N-INVAS EAR/PLS OXIMETRY 1: CPT

## 2019-05-28 PROCEDURE — 3700000001 HC ADD 15 MINUTES (ANESTHESIA): Performed by: INTERNAL MEDICINE

## 2019-05-28 PROCEDURE — 43239 EGD BIOPSY SINGLE/MULTIPLE: CPT | Performed by: INTERNAL MEDICINE

## 2019-05-28 PROCEDURE — 6370000000 HC RX 637 (ALT 250 FOR IP): Performed by: INTERNAL MEDICINE

## 2019-05-28 RX ORDER — LIDOCAINE HYDROCHLORIDE 10 MG/ML
INJECTION, SOLUTION EPIDURAL; INFILTRATION; INTRACAUDAL; PERINEURAL PRN
Status: DISCONTINUED | OUTPATIENT
Start: 2019-05-28 | End: 2019-05-28 | Stop reason: SDUPTHER

## 2019-05-28 RX ORDER — SUCRALFATE 1 G/1
1 TABLET ORAL 4 TIMES DAILY
Qty: 120 TABLET | Refills: 1 | Status: SHIPPED | OUTPATIENT
Start: 2019-05-28

## 2019-05-28 RX ORDER — DULOXETIN HYDROCHLORIDE 30 MG/1
30 CAPSULE, DELAYED RELEASE ORAL DAILY
Qty: 30 CAPSULE | Refills: 3 | Status: SHIPPED | OUTPATIENT
Start: 2019-05-28

## 2019-05-28 RX ORDER — PROPOFOL 10 MG/ML
INJECTION, EMULSION INTRAVENOUS PRN
Status: DISCONTINUED | OUTPATIENT
Start: 2019-05-28 | End: 2019-05-28 | Stop reason: SDUPTHER

## 2019-05-28 RX ORDER — PANTOPRAZOLE SODIUM 40 MG/1
40 TABLET, DELAYED RELEASE ORAL
Qty: 90 TABLET | Refills: 1 | Status: SHIPPED | OUTPATIENT
Start: 2019-05-28

## 2019-05-28 RX ORDER — PREGABALIN 75 MG/1
75 CAPSULE ORAL 3 TIMES DAILY
Qty: 90 CAPSULE | Refills: 0 | Status: SHIPPED | OUTPATIENT
Start: 2019-05-28 | End: 2019-06-27

## 2019-05-28 RX ORDER — DIPHENHYDRAMINE HYDROCHLORIDE 12.5 MG/1
12.5 BAR, CHEWABLE ORAL 3 TIMES DAILY
Qty: 90 TABLET | Refills: 0 | Status: SHIPPED | OUTPATIENT
Start: 2019-05-28

## 2019-05-28 RX ADMIN — LIDOCAINE HYDROCHLORIDE 50 MG: 10 INJECTION, SOLUTION EPIDURAL; INFILTRATION; INTRACAUDAL at 09:07

## 2019-05-28 RX ADMIN — PROPOFOL 100 MG: 10 INJECTION, EMULSION INTRAVENOUS at 09:10

## 2019-05-28 RX ADMIN — PROPOFOL 520 MG: 10 INJECTION, EMULSION INTRAVENOUS at 09:11

## 2019-05-28 RX ADMIN — PROMETHAZINE HYDROCHLORIDE 6.25 MG: 25 INJECTION INTRAMUSCULAR; INTRAVENOUS at 06:26

## 2019-05-28 RX ADMIN — Medication 10 ML: at 00:57

## 2019-05-28 RX ADMIN — PROPOFOL 100 MG: 10 INJECTION, EMULSION INTRAVENOUS at 09:07

## 2019-05-28 RX ADMIN — SUCRALFATE 1 G: 1 TABLET ORAL at 17:09

## 2019-05-28 RX ADMIN — Medication 10 MG: at 17:10

## 2019-05-28 ASSESSMENT — PAIN SCALES - WONG BAKER
WONGBAKER_NUMERICALRESPONSE: 0

## 2019-05-28 ASSESSMENT — PAIN DESCRIPTION - LOCATION
LOCATION: ABDOMEN
LOCATION: ABDOMEN

## 2019-05-28 ASSESSMENT — PAIN DESCRIPTION - PAIN TYPE
TYPE: ACUTE PAIN
TYPE: ACUTE PAIN

## 2019-05-28 ASSESSMENT — PAIN - FUNCTIONAL ASSESSMENT: PAIN_FUNCTIONAL_ASSESSMENT: 0-10

## 2019-05-28 ASSESSMENT — PAIN SCALES - GENERAL
PAINLEVEL_OUTOF10: 5
PAINLEVEL_OUTOF10: 0
PAINLEVEL_OUTOF10: 7

## 2019-05-28 NOTE — ANESTHESIA PRE PROCEDURE
Department of Anesthesiology  Preprocedure Note       Name:  Carla Clement   Age:  34 y.o.  :  1990                                          MRN:  [id number]         Date:  2019      Surgeon: Sarah Isbell):  Halima Erwin MD    Procedure: EGD ESOPHAGOGASTRODUODENOSCOPY (N/A )    Medications prior to admission:   Prior to Admission medications    Medication Sig Start Date End Date Taking? Authorizing Provider   DULoxetine (CYMBALTA) 30 MG extended release capsule Take 1 capsule by mouth daily 19 Yes Nicola Espinoza MD   pregabalin (LYRICA) 75 MG capsule Take 1 capsule by mouth 3 times daily for 30 days. 19 Yes Nicola Espinoza MD   celecoxib (CELEBREX) 200 MG capsule Take 1 capsule by mouth daily 19 Yes Nicola Espinoza MD   acetaminophen (TYLENOL) 325 MG tablet Take 1 tablet by mouth every 6 hours as needed for Pain 12/3/18  Yes Maritza Brody MD   HYDROcodone-acetaminophen (NORCO) 5-325 MG per tablet Take 1 tablet by mouth 2 times daily as needed for Pain. Altagracia Kerns Historical Provider, MD   Povidone-Iodine Scrub Sponge (BETADINE SWAB AID) 10 % SWAB Apply 1 Stick topically daily 19   Ann Zamudio DPM   Misc. Devices (TUB TRANSFER BOARD) MISC 1 Units by Does not apply route daily 18   Mikel Gaming DPM   Misc. Devices [facility]) MISC 1 Units by Does not apply route daily 18   Mikel Gaming DPM   Misc. Devices (FREE SPIRIT KNEE/LEG WALKER) MISC Please use walker to mobilize until cleared by podiatry to bear weight on foot.  8/10/18   Seth Finley MD       Current medications:    Current Facility-Administered Medications   Medication Dose Route Frequency Provider Last Rate Last Dose    [MAR Hold] promethazine (PHENERGAN) injection 6.25 mg  6.25 mg Intravenous Q6H PRN Cornelio Silva MD   6.25 mg at 19 0626    [MAR Hold] docusate sodium (COLACE) capsule 100 mg  100 mg Oral Daily Valene Mix, APRN - CNP   100 mg at 19 0840    [MAR Hold] Complex regional pain syndrome type 1 of right lower extremity G90.521    Displaced fracture of fifth metatarsal bone of left foot with delayed healing S92.352G    Nausea vomiting and diarrhea R11.2, R19.7    Ysa-German syndrome vs gastritis vs viral gastroenteritis K92.2    Abdominal pain, epigastric R10.13       Past Medical History:        Diagnosis Date    Cellulitis 9/24/2018    Cellulitis and abscess of foot 9/24/2018    Closed displaced fracture of base of fourth metacarpal bone of right hand 7/23/2018    Foot fracture, right 08/2018    GERD (gastroesophageal reflux disease)     no longer on RX    Hand fracture, right 08/2018    Osteomyelitis of right foot [facility])        Past Surgical History:        Procedure Laterality Date    FINGER CLOSED REDUCTION  07/25/2018    CLOSED REDUCTION PERC PINNING SMALL AND FINGER FINGER METACARPAL BASE -    NERVE BLOCK Right 01/03/2019    right lumbar sympathetic- decadron 10 mg    NERVE BLOCK Right 01/15/2019    rt lumbar sympathetic block. no steroid.  Naropen    NERVE BLOCK  01/30/2019    right lumbar sympathetic # 3,  isovue,decadron, naropin,isovue    OPEN TREATMENT METATARSAL FRACTURE EACH Right 8/29/2018    OPEN REDUCTION INTERNAL FIXATION 5TH METATARSAL, MEDIAL MALLEOLUS ORIF  (PARAGON 28 - JOYCE SLOAT) performed by Dale Glass DPM at [address] [place] [address]  08/29/2018     OPEN REDUCTION INTERNAL FIXATION 5TH METATARSAL, MEDIAL MALLEOLUS ORIF  (PARAGON 28 - JOYCE SLOAT) (Right Foot)    OTHER SURGICAL HISTORY Right 08/29/2018    ORIF 5th metatarsal    NV OFFICE/OUTPT VISIT,PROCEDURE ONLY Right 7/25/2018    CLOSED REDUCTION PERC PINNING SMALL AND FINGER FINGER METACARPAL BASE - C ARM, SYNTHES performed by Juan Francisco Wagoner MD at [place] [address] EXTRACTION         Social History:    Social History     Tobacco Use    Smoking status: Former Smoker     Packs/day: 0.50     Years: 3.00 [id number]     Type & Screen (If Applicable):  No results found for: [facility]    Anesthesia Evaluation  Patient summary reviewed and Nursing notes reviewed  Airway: Mallampati: II  TM distance: >3 FB   Neck ROM: full  Mouth opening: > = 3 FB Dental: normal exam         Pulmonary:Negative Pulmonary ROS breath sounds clear to auscultation                             Cardiovascular:Negative CV ROS          ECG reviewed  Rhythm: regular  Rate: normal                    Neuro/Psych:   (+) neuromuscular disease:,             GI/Hepatic/Renal:   (+) GERD: well controlled,           Endo/Other: Negative Endo/Other ROS                    Abdominal:   (+) obese,         Vascular: negative vascular ROS.                                  05/26/2019  Narrative   Performed by: OFELIA STLUDWIN MUSE   Normal sinus rhythm with sinus arrhythmia  Normal ECG  No previous ECGs available          Anesthesia Plan      MAC     ASA 2       Induction: intravenous. Anesthetic plan and risks discussed with patient. Plan discussed with attending.                   Graciela Calvin, ELIGIO - CRNA   5/28/2019

## 2019-05-28 NOTE — PROGRESS NOTES
[facility].    PROGRESS NOTE             Date:   5/28/2019  Patient name:  Damian Aleman  Date of admission:  5/24/2019  6:28 PM  MRN:   [id number]  YOB: 1990     CHIEF COMPLAINT     Abdominal pain, diarrhea, vomiting    History Obtained From:  Patient and chart review. HISTORY OF PRESENT ILLNESS      The patient is a 34 y.o.  male who is admitted to the hospital for Abdominal pain, diarrhea, vomiting. This could be either secondary to Yas-German syndrome or gastritis. Patient also takes regular celecoxib along with ibuprofen and Motrin occasionally and hence at high risk of PUD although his hemoglobin was 16.9. BUN was not elevated. GI was consulted and recommend non emergent EGD. Current eval  S/p EGD today  Vitals stable  Hb stable        IMPRESSION:     Non-Ulcer Dyspepsia (NUD)     1-Mild gastroduodenitis. No ulcerations, no erosions, no structural causes to explain episodes of intractable nausea and emesis        RECOMMENDATIONS:   1) Can place on CLD for now and advance as tolerated. Avoid Marijuana, NSAIDS, smoking, and alcohol. Would provide protonix 40mg daily. Continue with Benadryl 10mg TID prior to meals. Continue carafate 1g QID. Recommend compliancy with GERD diet. 2) Return back to floor for medical care, ok for discharge from GI standpoint. Patient may follow up in GI clinic for path results.            PAST MEDICAL HISTORY       has a past medical history of Cellulitis, Cellulitis and abscess of foot, Closed displaced fracture of base of fourth metacarpal bone of right hand, Foot fracture, right, GERD (gastroesophageal reflux disease), Hand fracture, right, and Osteomyelitis of right foot ([facility] [address]     PAST SURGICAL HISTORY       has a past surgical history that includes [place] tooth extraction; Finger Closed Reduction (07/25/2018); pr office/outpt visit,procedure only (Right, 7/25/2018); Upper gastrointestinal endoscopy; other surgical history (08/29/2018); other surgical history (Right, 08/29/2018); open treatment metatarsal fracture each (Right, 8/29/2018); Nerve Block (Right, 01/03/2019); Nerve Block (Right, 01/15/2019); and Nerve Block (01/30/2019). HOME MEDICATIONS        Prior to Admission medications    Medication Sig Start Date End Date Taking? Authorizing Provider   DULoxetine (CYMBALTA) 30 MG extended release capsule Take 1 capsule by mouth daily 5/21/19 6/20/19 Yes Moriah Quinonez MD   pregabalin (LYRICA) 75 MG capsule Take 1 capsule by mouth 3 times daily for 30 days. 5/21/19 6/20/19 Yes Moriah Quinonez MD   celecoxib (CELEBREX) 200 MG capsule Take 1 capsule by mouth daily 5/21/19 6/20/19 Yes Moriah Quinonez MD   acetaminophen (TYLENOL) 325 MG tablet Take 1 tablet by mouth every 6 hours as needed for Pain 12/3/18  Yes Levon Jay MD   HYDROcodone-acetaminophen (NORCO) 5-325 MG per tablet Take 1 tablet by mouth 2 times daily as needed for Pain. Joe Monroy Historical Provider, MD   Povidone-Iodine Scrub Sponge (BETADINE SWAB AID) 10 % SWAB Apply 1 Stick topically daily 1/9/19   Quintin Olivas, MELONIE   [facility] Devices (TUB TRANSFER BOARD) MISC 1 Units by Does not apply route daily 12/19/18   Antonia Valle DPM   [facility]. Devices [facility]) [facility] 1 Units by Does not apply route daily 12/19/18   Antonia Valle DPM   [facility]. Devices (FREE SPIRIT KNEE/LEG WALKER) MISC Please use walker to mobilize until cleared by podiatry to bear weight on foot. 8/10/18   Sarah Casper MD       ALLERGIES      Contrast [iodides]    SOCIAL HISTORY       reports that he quit smoking about 16 months ago. His smoking use included cigarettes. He has a 1.50 pack-year smoking history. He has never used smokeless tobacco. He reports that he drinks alcohol. He reports that he does not use drugs.      FAMILY HISTORY      family history includes Diabetes in his mother; Hypertension in his father; Mental Illness in his father. REVIEW OF SYSTEMS      · Constitutional: Negative for weight loss  · Eyes: Negative for visual changes, diplopia, scleral icterus. · ENT: Negative for Headaches, hearing loss, vertigo, mouth sores, sore throat. · Cardiovascular: Negative for lightheadedness/orthostatic symptoms ,chest pain, dyspnea on exertion, palpitations or loss of consciousness. · Respiratory: Negative for cough or wheezing, sputum production, hemoptysis, pleuritic pain. · Gastrointestinal: Negative for nausea/vomiting, change in bowel habits, abdominal pain, dysphagia/appetite loss, hematemesis, blood in stools. · Genitourinary:Negative for change in bladder habits, dysuria, trouble voiding, hematuria. · Musculoskeletal: Negative for gait disturbance, weakness, joint complaints. · Integumentary: Negative for rash, pruritis. · Neurological: Negative for headache, dizziness, change in muscle strength, numbness/tingling, change in gait, balance, coordination,   · Psychiatric: negative for change in mood, affect, memory, mentation, behavior. · Endocrine: negative for temperature intolerance, excessive thirst, fluid intake, or urination, tremor. · Hematologic/Lymphatic: negative for abnormal bruising or bleeding, blood clots, swollen lymph nodes. · Allergic/Immunologic: negative for nasal congestion, pruritis, hives. PHYSICAL EXAM      /82   Pulse 60   Temp 98.3 °F (36.8 °C) (Oral)   Resp 20   Ht 6' 2\" (1.88 m)   Wt 270 lb (122.5 kg)   SpO2 93%   BMI 34.67 kg/m²      · General appearance: well nourished  · HEENT: Head: Normocephalic, no lesions, without obvious abnormality. · Lungs: clear to auscultation bilaterally  · Heart: regular rate and rhythm, S1, S2 normal, no murmur, click, rub or gallop  · Abdomen: soft, non-tender; bowel sounds normal; no masses,  no organomegaly  · Extremities: extremities normal, atraumatic, no cyanosis or edema  · Neurological: Gait normal. Reflexes normal and symmetric. Sensation grossly normal  · Skin - no rash, no lump   · Eye no icterus no redness  · Psych-normal affect   · NEURO-no limb weakness  No facial droop  · Lymphatic system-no lymphadenopathy no splenomegaly     DIAGNOSTICS      Laboratory Testing:  CBC:   Recent Labs     05/26/19  0610 05/27/19  0958   WBC 9.7  --    HGB 13.5 14.3     --      BMP:    No results for input(s): NA, K, CL, CO2, BUN, CREATININE, GLUCOSE in the last 72 hours. S. Calcium:  No results for input(s): CALCIUM in the last 72 hours. S. Ionized Calcium:No results for input(s): IONCA in the last 72 hours. S. Magnesium:No results for input(s): MG in the last 72 hours. S. Phosphorus:No results for input(s): PHOS in the last 72 hours. S. Glucose:No results for input(s): POCGLU in the last 72 hours. Glycosylated hemoglobin A1C: No results for input(s): LABA1C in the last 72 hours. INR: No results for input(s): INR in the last 72 hours. Hepatic functions:   No results for input(s): ALKPHOS, ALT, AST, PROT, BILITOT, BILIDIR, LABALBU in the last 72 hours. Pancreatic functions:No results for input(s): LACTA, AMYLASE in the last 72 hours. S. Lactic Acid: No results for input(s): LACTA in the last 72 hours. Cardiac enzymes:No results for input(s): CKTOTAL, CKMB, CKMBINDEX, TROPONINI in the last 72 hours. BNP:No results for input(s): BNP in the last 72 hours. Lipid profile: No results for input(s): CHOL, TRIG, HDL, LDLCALC in the last 72 hours. Invalid input(s): LDL  Blood Gases: No results found for: PH, PCO2, PO2, HCO3, O2SAT  Thyroid functions: No results found for: TSH     Imaging/Diagonstics:      CXR: No acute cardiopulmonary findings. 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 *        PLAN        1. Patient gives a history of dark-colored vomitus and stool. - NUD - protonix, sucralfate and benadryl. CLD - advance as tolerated  2. Avoid NSAIDs  3. Stool for occult blood, fecal lactoferrin, stool culture. - negative  4. EPC for DVT prophylaxis    Discharge - home today or phillip as patient tolerates diet      MD LUIS Gloria [address], [address] [place] [address].    Phone [phone]   Fax: [phone]  Answering Service: [phone]

## 2019-05-28 NOTE — PROGRESS NOTES
Pt c/o feeling \"anxiety\" . Hands a little shaky and stated had some \"palpitation. Denies Chest pain, SOB. Requesting something to help comfort pt.   Perfect serve message sent to on call Internal Med MD.

## 2019-05-28 NOTE — OP NOTE
[facility]    [facility] ENDOSCOPY     EGD    PROCEDURE DATE: 05/28/19    REFERRING PHYSICIAN: No ref. provider found     PRIMARY CARE PROVIDER: Lela Medina MD    ATTENDING PHYSICIAN: Eliel Berumen MD     HISTORY: Mr. Patty Stack is a 34 y.o. male who presents to the [facility] Endoscopy unit for upper endoscopy. The patient's clinical history is remarkable for episodes of nausea, emesis, intractable and episodic, marijuana abuse, obesity, admitted for dyspepsia and nausea and emesis. He is currently medically stable and appropriate for the planned procedure. PREOPERATIVE DIAGNOSIS: Dyspepsia with nausea and emesis. PROCEDURES:   1) Transoral Upper Endoscopy with gastric and duodenal biopsy     POSTOPERATIVE DIAGNOSIS:     Non-Ulcer Dyspepsia (NUD)    1-Mild gastroduodenitis. No ulcerations, no erosions, no structural causes to explain episodes of intractable nausea and emesis      MEDICATIONS:   MAC per anesthesia     EBL: 5cc    INSTRUMENT: Olympus GIF-H180  flexible Gastroscope. PREPARATION: The nature and character of the procedure as well as risks, benefits, and alternatives were discussed with the patient and informed consent was obtained. Complications were said to include, but were not limited to: medication allergy, medication reaction, cardiovascular and respiratory problems, bleeding, perforation, infection, and/or missed diagnosis. Following arrival in the endoscopy room, the patient was placed in the left lateral decubitus position and final time-out accomplished in the presence of the nursing staff. Baseline vital signs were obtained and reviewed, and IV sedation was subsequently initiated. FINDINGS:   Esophagus: The esophagus was inspected to the Z-line. The endoscopic exam showed normal findings, normal GEJ, regular Z-line at 40cm from incisors. No hiatal hernia. No esophagitis. Stomach:  The stomach was inspected in both forward and retroflex fashion and was appropriately distensible. The cardia, fundus, incisura, antrum and pylorus were identified via direct visualization. The endoscopic exam showed areas of scattered erythema involving the gastric body and antrum, no ulcerations, no erosions. No other concerning findings. Cold biopsy taken of the stomach to r/o H. Pylori. Pyloric channel appeared normal.     Duodenum: The proximal small bowel was inspected through the bulb, sweep, and second portion of the duodenum. The endoscopic exam showed mild duodenitis in the duodenal bulb, no erosions, no ulcerations. Biopsy taken of the second portion to r/o celiac disease. GEJ:      Stomach:      Pylorus:      Duodenal Bulb:      D2: IMPRESSION:    Non-Ulcer Dyspepsia (NUD)    1-Mild gastroduodenitis. No ulcerations, no erosions, no structural causes to explain episodes of intractable nausea and emesis      RECOMMENDATIONS:   1) Can place on CLD for now and advance as tolerated. Avoid Marijuana, NSAIDS, smoking, and alcohol. Would provide protonix 40mg daily. Continue with Benadryl 10mg TID prior to meals. Continue carafate 1g QID. Recommend compliancy with GERD diet. 2) Return back to floor for medical care, ok for discharge from GI standpoint. Patient may follow up in GI clinic for path results.        [facility]

## 2019-05-29 LAB — SURGICAL PATHOLOGY REPORT: NORMAL

## 2019-05-31 NOTE — DISCHARGE SUMMARY
Internal Medicine   Discharge Summary         Patient Identification:  Usha Wallace is a 34 y.o. male. :  1990  MRN: [id number]     Acct: [de-identified]   Admit Date:  2019  Discharge date and time: 2019  9:00 PM     Attending Provider: No att. 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 Was started to be secondary to Yas-German syndrome or gastritis as he regularly takes celecoxib along with ibuprofen and Motrin. Throughout the hospital stay hemoglobin stayed stable required no blood transfusion. Amy Mike was consulted EGD was performed which showed nonulcer dyspepsia showing mild gastroduodenitis. No ulceration, erosions are structural causes to explain episodes of intractable nausea and vomiting. Patient was started on clear liquid diet and was advanced as per Dr. Zoila IVAN and patient was consults to avoid marijuana, and Lasix, smoking and alcohol was discharged on Protonix, Benadryl and sucralfate. Disposition:   Home     Discharged Condition:  Stable     Discharge Medications:        Adele Bonilla   Home Medication Instructions [id number]    Printed on:19 1432   Medication Information                      diphenhydrAMINE (BENADRYL ALLERGY CHILDRENS) 12.5 MG chewable tablet  Take 1 tablet by mouth 3 times daily             DULoxetine (CYMBALTA) 30 MG extended release capsule  Take 1 capsule by mouth daily             Misc. Devices (FREE SPIRIT KNEE/LEG WALKER) MISC  Please use walker to mobilize until cleared by podiatry to bear weight on foot. Misc. Devices (TUB TRANSFER BOARD) MISC  1 Units by Does not apply route daily             Misc. Devices MERIT [facility]) MISC  1 Units by Does not apply route daily             pantoprazole (PROTONIX) 40 MG tablet  Take 1 tablet by mouth every morning (before breakfast)             Povidone-Iodine Scrub Sponge (BETADINE SWAB AID) 10 % SWAB  Apply 1 Stick topically daily             pregabalin (LYRICA) 75 MG capsule  Take 1 capsule by mouth 3 times daily for 30 days. sucralfate (CARAFATE) 1 GM tablet  Take 1 tablet by mouth 4 times daily                 Discharge Instructions: Follow up with Sunny Nolasco MD in 1-2 weeks.     Hospital acquired Infections: None       Edgar Silva  PGY-3, Department of Internal medicine   [address], [place]

## 2019-06-15 ENCOUNTER — HOSPITAL ENCOUNTER (EMERGENCY)
Age: 29
Discharge: HOME | End: 2019-06-16
Attending: EMERGENCY MEDICINE
Payer: COMMERCIAL

## 2019-06-15 VITALS
BODY MASS INDEX: 33.37 KG/M2 | OXYGEN SATURATION: 99 % | HEART RATE: 96 BPM | HEIGHT: 74 IN | RESPIRATION RATE: 18 BRPM | TEMPERATURE: 98.6 F | DIASTOLIC BLOOD PRESSURE: 93 MMHG | SYSTOLIC BLOOD PRESSURE: 158 MMHG | WEIGHT: 260 LBS

## 2019-06-15 DIAGNOSIS — S92.324A CLOSED NONDISPLACED FRACTURE OF SECOND METATARSAL BONE OF RIGHT FOOT, INITIAL ENCOUNTER: ICD-10-CM

## 2019-06-15 DIAGNOSIS — S62.339A CLOSED BOXER'S FRACTURE, INITIAL ENCOUNTER: Primary | ICD-10-CM

## 2019-06-15 PROCEDURE — 99283 EMERGENCY DEPT VISIT LOW MDM: CPT

## 2019-06-15 PROCEDURE — 29125 APPL SHORT ARM SPLINT STATIC: CPT

## 2019-06-15 ASSESSMENT — PAIN DESCRIPTION - LOCATION: LOCATION: FOOT;HAND

## 2019-06-15 ASSESSMENT — PAIN DESCRIPTION - ORIENTATION: ORIENTATION: RIGHT

## 2019-06-15 ASSESSMENT — PAIN SCALES - GENERAL: PAINLEVEL_OUTOF10: 10

## 2019-06-15 ASSESSMENT — PAIN DESCRIPTION - PAIN TYPE: TYPE: ACUTE PAIN

## 2019-06-16 ENCOUNTER — APPOINTMENT (OUTPATIENT)
Dept: GENERAL RADIOLOGY | Age: 29
End: 2019-06-16
Payer: COMMERCIAL

## 2019-06-16 PROCEDURE — 73630 X-RAY EXAM OF FOOT: CPT

## 2019-06-16 PROCEDURE — 73130 X-RAY EXAM OF HAND: CPT

## 2019-06-16 PROCEDURE — 6370000000 HC RX 637 (ALT 250 FOR IP): Performed by: EMERGENCY MEDICINE

## 2019-06-16 RX ORDER — IBUPROFEN 600 MG/1
600 TABLET ORAL EVERY 6 HOURS PRN
Qty: 120 TABLET | Refills: 0 | Status: SHIPPED | OUTPATIENT
Start: 2019-06-16

## 2019-06-16 RX ORDER — IBUPROFEN 600 MG/1
600 TABLET ORAL ONCE
Status: COMPLETED | OUTPATIENT
Start: 2019-06-16 | End: 2019-06-16

## 2019-06-16 RX ADMIN — IBUPROFEN 600 MG: 600 TABLET ORAL at 00:29

## 2019-06-16 ASSESSMENT — ENCOUNTER SYMPTOMS
GASTROINTESTINAL NEGATIVE: 1
RESPIRATORY NEGATIVE: 1
EYES NEGATIVE: 1

## 2019-06-16 ASSESSMENT — PAIN DESCRIPTION - PAIN TYPE: TYPE: ACUTE PAIN

## 2019-06-16 ASSESSMENT — PAIN SCALES - GENERAL
PAINLEVEL_OUTOF10: 7
PAINLEVEL_OUTOF10: 10

## 2019-06-16 ASSESSMENT — PAIN DESCRIPTION - LOCATION: LOCATION: HAND;FOOT
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
## 2019-06-19 ENCOUNTER — OFFICE VISIT (OUTPATIENT)
Dept: PODIATRY | Age: 29
End: 2019-06-19
Payer: COMMERCIAL

## 2019-06-19 ENCOUNTER — OFFICE VISIT (OUTPATIENT)
Dept: ORTHOPEDIC SURGERY | Age: 29
End: 2019-06-19
Payer: COMMERCIAL

## 2019-06-19 VITALS
DIASTOLIC BLOOD PRESSURE: 75 MMHG | BODY MASS INDEX: 33.37 KG/M2 | SYSTOLIC BLOOD PRESSURE: 116 MMHG | WEIGHT: 260 LBS | HEIGHT: 74 IN | HEART RATE: 58 BPM

## 2019-06-19 VITALS — BODY MASS INDEX: 33.37 KG/M2 | HEIGHT: 74 IN | WEIGHT: 260 LBS

## 2019-06-19 DIAGNOSIS — S62.396A CLOSED DISPLACED FRACTURE OF OTHER PART OF FIFTH METACARPAL BONE OF RIGHT HAND, INITIAL ENCOUNTER: Primary | ICD-10-CM

## 2019-06-19 DIAGNOSIS — Z98.890 POST-OPERATIVE STATE: ICD-10-CM

## 2019-06-19 DIAGNOSIS — Z98.890 S/P FOOT SURGERY, RIGHT: ICD-10-CM

## 2019-06-19 DIAGNOSIS — S92.324A NONDISPLACED FRACTURE OF SECOND METATARSAL BONE, RIGHT FOOT, INITIAL ENCOUNTER FOR CLOSED FRACTURE: Primary | ICD-10-CM

## 2019-06-19 DIAGNOSIS — M79.671 PAIN OF RIGHT FOOT: ICD-10-CM

## 2019-06-19 DIAGNOSIS — G90.521 COMPLEX REGIONAL PAIN SYNDROME TYPE 1 OF RIGHT LOWER EXTREMITY: ICD-10-CM

## 2019-06-19 DIAGNOSIS — S92.324A CLOSED NONDISPLACED FRACTURE OF SECOND METATARSAL BONE OF RIGHT FOOT, INITIAL ENCOUNTER: ICD-10-CM

## 2019-06-19 PROCEDURE — 99212 OFFICE O/P EST SF 10 MIN: CPT | Performed by: STUDENT IN AN ORGANIZED HEALTH CARE EDUCATION/TRAINING PROGRAM

## 2019-06-19 PROCEDURE — 99203 OFFICE O/P NEW LOW 30 MIN: CPT | Performed by: STUDENT IN AN ORGANIZED HEALTH CARE EDUCATION/TRAINING PROGRAM

## 2019-06-19 PROCEDURE — 99213 OFFICE O/P EST LOW 20 MIN: CPT | Performed by: STUDENT IN AN ORGANIZED HEALTH CARE EDUCATION/TRAINING PROGRAM

## 2019-06-19 RX ORDER — BACITRACIN ZINC AND POLYMYXIN B SULFATE 500; 1000 [USP'U]/G; [USP'U]/G
OINTMENT TOPICAL
Qty: 1 TUBE | Refills: 1 | Status: SHIPPED | OUTPATIENT
Start: 2019-06-19 | End: 2019-06-26
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
## 2019-06-19 NOTE — PROGRESS NOTES
Patient instructed to remove shoes and socks, instructed to sit in exam chair. Current PCP name is Lars and date of last visit . Do you have a follow up visit scheduled?  no
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
## 2019-06-27 DIAGNOSIS — S62.396A CLOSED DISPLACED FRACTURE OF OTHER PART OF FIFTH METACARPAL BONE OF RIGHT HAND, INITIAL ENCOUNTER: Primary | ICD-10-CM

## 2019-07-03 ENCOUNTER — TELEPHONE (OUTPATIENT)
Dept: PODIATRY | Age: 29
End: 2019-07-03

## 2023-06-15 NOTE — ED PROVIDER NOTES
C/O NAUSEA, VOMITING, DIARRHEA, FEVER, ABD DISCOMFORT FOR PAST WEEK, WORSE OVER PAST SEV DAYS. ALSO HAD POSS ALLERGIC/ANAPHYLACTOID REACTION TO IV CONTRAST ADMINISTERED FOR CT. ALLERGIC REACTION TX'ED, REPORTEDLY IMPROVING. LAB RESULTS REVIEWED. MOD LEUKOCYTOSIS, MILD HYPOKALEMIA. REVIEWED CT INTERP-NO CLINICALLY SIGNIFICANT  ABNORMALITIES OR DIAGNOSTIC FINDINGS. WILL REASSESS RE PRESENTATION SX AND ALSO FOR POSS ALLERGIC REACTION AND RESPONSE TO TX OF SAME.       Yoshi Cao MD  05/24/19 5131 Female

## (undated) DEVICE — DRAPE C ARM UNIV W41XL74IN CLR PLAS XR VELC CLSR POLY STRP

## (undated) DEVICE — BANDAGE,GAUZE,BULKEE II,4.5"X4.1YD,STRL: Brand: MEDLINE

## (undated) DEVICE — GOWN,AURORA,NONREINFORCED,LARGE: Brand: MEDLINE

## (undated) DEVICE — GLOVE ORANGE PI 7   MSG9070

## (undated) DEVICE — SUTURE VCRL SZ 2-0 L27IN ABSRB UD L22MM X-1 1/2 CIR REV CUT J459H

## (undated) DEVICE — P28, OLIVE WIRE,  STANDARD (1.3MM TIP), SMOOTH, SS: Brand: BABY GORILLA/GORILLA PLATING SYSTEM

## (undated) DEVICE — BNDG,ELSTC,MATRIX,STRL,4"X5YD,LF,HOOK&LP: Brand: MEDLINE

## (undated) DEVICE — SINGLE USE DEVICE INTENDED TO COVER EXPOSED ENDS OF ORTHOPEDIC PIN AND K-WIRES TO HELP PROTECT THE EXPOSED WIRE FROM SNAGGING ON CLOTHING.: Brand: OXBORO™ PIN COVER

## (undated) DEVICE — ZIMMER® STERILE DISPOSABLE TOURNIQUET CUFF WITH PROTECTIVE SLEEVE AND PLC, DUAL PORT, SINGLE BLADDER, 18 IN. (46 CM)

## (undated) DEVICE — ORTHO EXT PK

## (undated) DEVICE — ELECTRODE PT RET AD L9FT HI MOIST COND ADH HYDRGEL CORDED

## (undated) DEVICE — SUTURE VCRL SZ 4-0 L18IN ABSRB UD L19MM PS-2 3/8 CIR PRIM J496H

## (undated) DEVICE — FORCEPS BX L240CM JAW DIA22MM ORNG STD CAP W NDL RAD JAW 4

## (undated) DEVICE — GLOVE ORANGE PI 8   MSG9080

## (undated) DEVICE — DRESSING,GAUZE,XEROFORM,CURAD,1"X8",ST: Brand: CURAD

## (undated) DEVICE — Z DISCONTINUED BY MEDLINE USE 2711682 TRAY SKIN PREP DRY W/ PREM GLV

## (undated) DEVICE — GLOVE ORANGE PI 7 1/2   MSG9075

## (undated) DEVICE — SPLINT THMB W4XL15IN FBRGLS PD PRECUT LTWT DURABLE FAST SET

## (undated) DEVICE — C-ARMOR C-ARM EQUIPMENT COVERS CLEAR STERILE UNIVERSAL FIT 12 PER CASE: Brand: C-ARMOR

## (undated) DEVICE — SOLUTION SCRB 4OZ 4% CHG H2O AIDED FOR PREOPERATIVE SKIN

## (undated) DEVICE — BLADE CLIPPER GEN PURP NS

## (undated) DEVICE — ZIMMER® STERILE DISPOSABLE TOURNIQUET CUFF WITH PROTECTIVE SLEEVE AND PLC, DUAL PORT, SINGLE BLADDER, 12 IN. (30 CM)

## (undated) DEVICE — P28, DRILL, Ø1.6 X 110MM (Ø2.5), SOLID, SS: Brand: BABY GORILLA®/GORILLA® PLATING SYSTEM

## (undated) DEVICE — SUTURE ETHLN SZ 4-0 L18IN NONABSORBABLE BLK L19MM PS-2 3/8 1667H

## (undated) DEVICE — MITT PREP W575XL775IN POVIDONE IOD HAIR REMV

## (undated) DEVICE — PADDING,UNDERCAST,COTTON, 4"X4YD STERILE: Brand: MEDLINE

## (undated) DEVICE — GAUZE,SPONGE,FLUFF,6"X6.75",STRL,5/TRAY: Brand: MEDLINE

## (undated) DEVICE — SVMMC HND

## (undated) DEVICE — ZIMMER® STERILE DISPOSABLE TOURNIQUET CUFF WITH PROTECTIVE SLEEVE AND PLC, DUAL PORT, SINGLE BLADDER, 42 IN. (107 CM)

## (undated) DEVICE — BNDG,ELSTC,MATRIX,STRL,3"X5YD,LF,HOOK&LP: Brand: MEDLINE

## (undated) DEVICE — DRAPE,REIN 53X77,STERILE: Brand: MEDLINE

## (undated) DEVICE — DRAPE,EXTREMITY,89X128,STERILE: Brand: MEDLINE

## (undated) DEVICE — BANDAGE GZ W2XL75IN ST RAYON POLY CNFRM STRTCH LTWT